# Patient Record
Sex: FEMALE | Race: BLACK OR AFRICAN AMERICAN | Employment: UNEMPLOYED | URBAN - METROPOLITAN AREA
[De-identification: names, ages, dates, MRNs, and addresses within clinical notes are randomized per-mention and may not be internally consistent; named-entity substitution may affect disease eponyms.]

---

## 2016-04-21 LAB — T. PALLIDUM, EXTERNAL: NEGATIVE

## 2016-11-14 LAB
CHLAMYDIA, EXTERNAL: NEGATIVE
HBSAG, EXTERNAL: NEGATIVE
HIV, EXTERNAL: NON REACTIVE
N. GONORRHEA, EXTERNAL: NEGATIVE
RUBELLA, EXTERNAL: NORMAL

## 2017-04-21 LAB
ANTIBODY SCREEN, EXTERNAL: NEGATIVE
T. PALLIDUM, EXTERNAL: NEGATIVE

## 2017-06-13 ENCOUNTER — HOSPITAL ENCOUNTER (OUTPATIENT)
Age: 32
Discharge: HOME | End: 2017-06-13
Attending: OBSTETRICS & GYNECOLOGY | Admitting: OBSTETRICS & GYNECOLOGY
Payer: MEDICAID

## 2017-06-13 VITALS
WEIGHT: 280 LBS | RESPIRATION RATE: 16 BRPM | HEART RATE: 93 BPM | DIASTOLIC BLOOD PRESSURE: 63 MMHG | HEIGHT: 65 IN | SYSTOLIC BLOOD PRESSURE: 119 MMHG | BODY MASS INDEX: 46.65 KG/M2 | TEMPERATURE: 98.3 F

## 2017-06-13 PROCEDURE — 99285 EMERGENCY DEPT VISIT HI MDM: CPT

## 2017-06-13 RX ORDER — RANITIDINE 150 MG/1
150 TABLET, FILM COATED ORAL 2 TIMES DAILY
COMMUNITY
End: 2017-07-02

## 2017-06-13 RX ORDER — ESOMEPRAZOLE MAGNESIUM 40 MG/1
40 CAPSULE, DELAYED RELEASE ORAL DAILY
COMMUNITY
End: 2017-07-02

## 2017-06-13 NOTE — PROGRESS NOTES
0345 - Pt and significant other to LDR Rm 9 (see details in next note). Wayne Shepherd and notified him of pt status: presenting complaint of painful UCs, breech presentation, hx of 3 c/sections (1 set of twins), no vaginal bleeding or leakage of fluid, reactive FHR tracing (lots of movement). Pt recently stated that she can tell UCs have spaced farther apart (approx 4 in past hour). Dr Fatou Shepherd states he will be out soon to assess pt.  0528 - Dr Fatou Shepherd at bedside to assess pt  0530 - SVE done: cervix closed. Dr Fatou Shepherd states pt may be discharged home and reminded her to keep her scheduled office appt this Friday. Pt agrees with plan. 1 - Discharge instructions/precautions explained; pt verbalizes understanding.

## 2017-06-13 NOTE — IP AVS SNAPSHOT
Summary of Care Report The Summary of Care report has been created to help improve care coordination. Users with access to [employer] or [address] (Web-based application) may access additional patient information including the Discharge Summary. If you are not currently a [address] user and need more information, please call the number listed below in the [place] [address] section and ask to be connected with Medical Records. Facility Information Name Address Phone [address] [id number] [phone] Patient Information Patient Name Sex SONAL Murillo ([id number]) Female 1985 Discharge Information Admitting Provider Service Area Unit Alpa Perdomo MD / [address] [place] [address] Labor & Delivery / [phone] Discharge Provider Discharge Date/Time Discharge Disposition Destination (none) 2017 05:33 (Pending) AHR (none) Patient Language Language ENGLISH [13] Hospital Problems as of 2017  Reviewed: 10/6/2014  6:38 AM by Shirley Alvarez MD  
 None Non-Hospital Problems as of 2017  Reviewed: 10/6/2014  6:38 AM by Shirley Alvarez MD  
  
  
  
 Class Noted - Resolved Last Modified Active Problems Obese  2013 - Present 2013 by Eh Chow MD  
  Entered by Eh Chow MD  
  Knee pain, left  2013 - Present 2013 by Eh Chow MD  
  Entered by Eh Chow MD  
  H/O  section  10/18/2013 - Present 10/21/2013 Entered by Margot Barton Breech presentation  10/18/2013 - Present 10/21/2013 Entered by Margot Barton Twins  2014 - Present 2014 Entered by Rosamaria Garcia MD  
  Other specified complication, antepartum  2014 - Present 2014 Entered by Rosamaria Garcia MD  
  
You are allergic to the following No active allergies Current Discharge Medication List  
  
ASK your doctor about these medications Dose & Instructions Dispensing Information Comments NexIUM 40 mg capsule Generic drug:  esomeprazole Dose:  40 mg Take 40 mg by mouth daily. Indications: HEARTBURN Refills:  0  
   
 ondansetron hcl 4 mg tablet Commonly known as:  ZOFRAN (AS HYDROCHLORIDE) Dose:  4 mg Take 1 tablet by mouth every eight (8) hours as needed for Nausea. Quantity:  20 tablet Refills:  0  
   
 prenatal multivit-ca-min-fe-fa Tab Commonly known as:  PRENATAL VITAMIN Dose:  1 Tab Take 1 Tab by mouth daily. Quantity:  30 Tab Refills:  0  
   
 ZANTAC 150 mg tablet Generic drug:  raNITIdine Dose:  150 mg Take 150 mg by mouth two (2) times a day. Indications: HEARTBURN Refills:  0 Current Immunizations Name Date Influenza Vaccine 9/10/2014, 9/1/2013 Tdap 10/9/2014 Follow-up Information Follow up With Details Comments Contact Info None   None (395) Patient stated that they have no PCP Discharge Instructions Weeks 34 to 36 of Your Pregnancy: Care Instructions Your Care Instructions By now, your baby and your belly have grown quite large. It is almost time to give birth. A full-term pregnancy can deliver between 37 and 42 weeks. Your baby's lungs are almost ready to breathe air. The bones in your baby's head are now firm enough to protect it, but soft enough to move down through the birth canal. 
You may feel excited, happy, anxious, or scared. You may wonder how you will know if you are in labor or what to expect during labor. Try to be flexible in your expectations of the birth. Because each birth is different, there is no way to know exactly what childbirth will be like for you. This care sheet will help you know what to expect and how to prepare. This may make your childbirth easier. If you haven't already had the Tdap shot during this pregnancy, talk to your doctor about getting it. It will help protect your  against pertussis infection. In the 36th week, most women have a test for group B streptococcus (GBS). GBS is a common bacteria that can live in the vagina and rectum. It can make your baby sick after birth. If you test positive, you will get antibiotics during labor. The medicine will keep your baby from getting the bacteria. Follow-up care is a key part of your treatment and safety. Be sure to make and go to all appointments, and call your doctor if you are having problems. It's also a good idea to know your test results and keep a list of the medicines you take. How can you care for yourself at home? Learn about pain relief choices · Pain is different for every woman. Talk with your doctor about your feelings about pain. · You can choose from several types of pain relief. These include medicine or breathing techniques, as well as comfort measures. You can use more than one option. · If you choose to have pain medicine during labor, talk to your doctor about your options. Some medicines lower anxiety and help with some of the pain. Others make your lower body numb so that you won't feel pain. · Be sure to tell your doctor about your pain medicine choice before you start labor or very early in your labor. You may be able to change your mind as labor progresses. · Rarely, a woman is put to sleep by medicine given through a mask or an IV. Labor and delivery · The first stage of labor has three parts: early, active, and transition. ¨ Most women have early labor at home. You can stay busy or rest, eat light snacks, drink clear fluids, and start counting contractions. ¨ When talking during a contraction gets hard, you may be moving to active labor. During active labor, you should head for the hospital if you are not there already. ¨ You are in active labor when contractions come every 3 to 4 minutes and last about 60 seconds. Your cervix is opening more rapidly. ¨ If your water breaks, contractions will come faster and stronger. ¨ During transition, your cervix is stretching, and contractions are coming more rapidly. ¨ You may want to push, but your cervix might not be ready. Your doctor will tell you when to push. · The second stage starts when your cervix is completely opened and you are ready to push. ¨ Contractions are very strong to push the baby down the birth canal. 
¨ You will feel the urge to push. You may feel like you need to have a bowel movement. ¨ You may be coached to push with contractions. These contractions will be very strong, but you will not have them as often. You can get a little rest between contractions. ¨ You may be emotional and irritable. You may not be aware of what is going on around you. ¨ One last push, and your baby is born. · The third stage is when a few more contractions push out the placenta. This may take 30 minutes or less. · The fourth stage is the welcome recovery. You may feel overwhelmed with emotions and exhausted but alert. This is a good time to start breastfeeding. Where can you learn more? Go to http://darrell-saman.info/. Enter M865 in the search box to learn more about \"Weeks 34 to 36 of Your Pregnancy: Care Instructions. \" Current as of: May 30, 2016 Content Version: 11.2 © 0986-0481 [employer]. Care instructions adapted under license by [employer] (which disclaims liability or warranty for this information). If you have questions about a medical condition or this instruction, always ask your healthcare professional. [place] [address] any warranty or liability for your use of this information. Week 37 of Your Pregnancy: Care Instructions Your Care Instructions You are near the end of your pregnancyand you're probably pretty uncomfortable. It may be harder to walk around. Lying down probably isn't comfortable either. You may have trouble getting to sleep or staying asleep. Most women deliver their babies between 40 and 41 weeks. This is a good time to think about packing a bag for the hospital with items you'll need. Then you'll be ready when labor starts. Follow-up care is a key part of your treatment and safety. Be sure to make and go to all appointments, and call your doctor if you are having problems. It's also a good idea to know your test results and keep a list of the medicines you take. How can you care for yourself at home? Learn about breastfeeding · Breastfeeding is best for your baby and good for you. · Breast milk has antibodies to help your baby fight infections. · Mothers who breastfeed often lose weight faster, because making milk burns calories. · Learning the best ways to hold your baby will make breastfeeding easier. · Let your partner bathe and diaper the baby to keep your partner from feeling left out. Snuggle together when you breastfeed. · You may want to learn how to use a breast pump and store your milk. · If you choose to bottle feed, make the feeding feel like breastfeeding so you can bond with your baby. Always hold your baby and the bottle. Do not prop bottles or let your baby fall asleep with a bottle. Learn about crying · It is common for babies to cry for 1 to 3 hours a day. Some cry more, some cry less. · Babies don't cry to make you upset or because you are a bad parent. · Crying is how your baby communicates. Your baby may be hungry; have gas; need a diaper change; or feel cold, warm, tired, lonely, or tense. Sometimes babies cry for unknown reasons. · If you respond to your baby's needs, he or she will learn to trust you. · Try to stay calm when your baby cries.  Your baby may get more upset if he or she senses that you are upset. Know how to care for your  · Your baby's umbilical cord stump will drop off on its own, usually between 1 and 2 weeks. To care for your baby's umbilical cord area: ¨ Clean the area at the bottom of the cord 2 or 3 times a day. ¨ Pay special attention to the area where the cord attaches to the skin. ¨ Keep the diaper folded below the cord. ¨ Use a damp washcloth or cotton ball to sponge bathe your baby until the stump has come off. · Your baby's first dark stool is called meconium. After the meconium is passed, your baby will develop his or her own bowel pattern. ¨ Some babies, especially  babies, have several bowel movements a day. Others have one or two a day, or one every 2 to 3 days. ¨  babies often have loose, yellow stools. Formula-fed babies have more formed stools. ¨ If your baby's stools look like little pellets, he or she is constipated. After 2 days of constipation, call your baby's doctor. · If your baby will be circumcised, you can care for him at home. ¨ Gently rinse his penis with warm water after every diaper change. Do not try to remove the film that forms on the penis. This film will go away on its own. Pat dry. ¨ Put petroleum ointment, such as Vaseline, on the area of the diaper that will touch your baby's penis. This will keep the diaper from sticking to your baby. ¨ Ask the doctor about giving your baby acetaminophen (Tylenol) for pain. Where can you learn more? Go to http://darrell-saman.info/. Enter 44 21 97 in the search box to learn more about \"Week 37 of Your Pregnancy: Care Instructions. \" Current as of: May 30, 2016 Content Version: 11.2 © 5276-2964 [employer]. Care instructions adapted under license by [employer] (which disclaims liability or warranty for this information).  If you have questions about a medical condition or this instruction, always ask your healthcare professional. [place] [address] any warranty or liability for your use of this information. Week 38 of Your Pregnancy: Care Instructions Your Care Instructions Believe it or not, your baby is almost here. You may have ideas about your baby's personality because of how much he or she moves. Or you may have noticed how he or she responds to sounds, warmth, cold, and light. You may even know what kind of music your baby likes. By now, you have a better idea of what to expect during delivery. You may have talked about your birth preferences with your doctor. But even if you want a vaginal birth, it is a good idea to learn about  births.  birth means that your baby is born through a cut (incision) in your lower belly. It is sometimes the best choice for the health of the baby and the mother. This care sheet can help you understand  births. It also gives you information about what to expect after your baby is born. And it helps you understand more about postpartum depression. Follow-up care is a key part of your treatment and safety. Be sure to make and go to all appointments, and call your doctor if you are having problems. It's also a good idea to know your test results and keep a list of the medicines you take. How can you care for yourself at home? Learn about  birth · Most C-sections are unplanned. They are done because of problems that occur during labor. These problems might include: 
¨ Labor that slows or stops. ¨ High blood pressure or other problems for the mother. ¨ Signs of distress in the baby. These signs may include a very fast or slow heart rate. · Although most mothers and babies do well after , it is major surgery. It has more risks than a vaginal delivery. · In some cases, a planned  may be safer than a vaginal delivery. This may be the case if: ¨ The mother has a health problem, such as a heart condition. ¨ The baby isn't in a head-down position for delivery. This is called a breech position. ¨ The uterus has scars from past surgeries. This could increase the chance of a tear in the uterus. ¨ There is a problem with the placenta. ¨ The mother has an infection, such as genital herpes, that could be spread to the baby. ¨ The mother is having twins or more. ¨ The baby weighs 9 to 10 pounds or more. · Because of the risks of , planned C-sections generally should be done only for medical reasons. And a planned  should be done at 39 weeks or later unless there is a medical reason to do it sooner. Know what to expect after delivery, and plan for the first few weeks at home · You, your baby, and your partner or  will get identification bands. Only people with matching bands can  the baby from the nursery. · You will learn how to feed, diaper, and bathe your baby. And you will learn how to care for the umbilical cord stump. If your baby will be circumcised, you will also learn how to care for that. · Ask people to wait to visit you until you are at home. And ask them to wash their hands before they touch your baby. · Make sure you have another adult in your home for at least 2 or 3 days after the birth. · During the first 2 weeks, limit when friends and family can visit. · Do not allow visitors who have colds or infections. Make sure all visitors are up to date with their vaccinations. Never let anyone smoke around your baby. · Try to nap when the baby naps. Be aware of postpartum depression · \"Baby blues\" are common for the first 1 to 2 weeks after birth. You may cry or feel sad or irritable for no reason. · For some women, these feelings last longer and are more intense. This is called postpartum depression. · If your symptoms last for more than a few weeks or you feel very depressed, ask your doctor for help. · Postpartum depression can be treated. Support groups and counseling can help. Sometimes medicine can also help. Where can you learn more? Go to http://darrell-saman.info/. Enter B044 in the search box to learn more about \"Week 38 of Your Pregnancy: Care Instructions. \" Current as of: May 30, 2016 Content Version: 11.2 © 7043-4421 [employer]. Care instructions adapted under license by [employer] (which disclaims liability or warranty for this information). If you have questions about a medical condition or this instruction, always ask your healthcare professional. [place] [address] any warranty or liability for your use of this information. Chart Review Routing History Recipient Method Report Sent By Brenda Carrion None [address] [employer] IP Auto Routed Notes Lyndia Moritz, MD [[id number]] 8/11/2014 11:29 PM 08/11/2014 None [address] [employer] IP Auto Routed Notes Kyle Sotelo MD [[id number]] 10/6/2014  7:45 AM 10/06/2014 None [address] [employer] IP Auto Routed Notes Lizzie Aschoff, MD 23 [id number] 10/10/2014  1:48 PM 10/10/2014

## 2017-06-13 NOTE — IP AVS SNAPSHOT
Current Discharge Medication List  
  
ASK your doctor about these medications Dose & Instructions Dispensing Information Comments Morning Noon Evening Bedtime NexIUM 40 mg capsule Generic drug:  esomeprazole Your last dose was: Your next dose is:    
   
   
 Dose:  40 mg Take 40 mg by mouth daily. Indications: HEARTBURN Refills:  0  
     
   
   
   
  
 ondansetron hcl 4 mg tablet Commonly known as:  ZOFRAN (AS HYDROCHLORIDE) Your last dose was: Your next dose is:    
   
   
 Dose:  4 mg Take 1 tablet by mouth every eight (8) hours as needed for Nausea. Quantity:  20 tablet Refills:  0  
     
   
   
   
  
 prenatal multivit-ca-min-fe-fa Tab Commonly known as:  PRENATAL VITAMIN Your last dose was: Your next dose is:    
   
   
 Dose:  1 Tab Take 1 Tab by mouth daily. Quantity:  30 Tab Refills:  0  
     
   
   
   
  
 ZANTAC 150 mg tablet Generic drug:  raNITIdine Your last dose was: Your next dose is:    
   
   
 Dose:  150 mg Take 150 mg by mouth two (2) times a day. Indications: HEARTBURN Refills:  0

## 2017-06-13 NOTE — H&P
EDC:2017  EGA: 36 weeks, 4 days      History of Present Illness:  Patient presents for evaluation of  contractions staring at 2AM. denies LOF, or VB, Positive FM. Previous  section. Patient's Prenatal Care with Doctor of Record Nata Garcia MD Notable For -    Breech presentation   lab screening  Normal pregnancy multigravida  Sickle cell trait FOB _REFUSES_  Prev LTCS desires repeat  Obesity in pregnancy BMI>34.99-FS ____  Family History of Breast Cancer          Impression & Recommendations:    Problem # 1:  Abdominal Pain Pregnancy  No evidence of  labor  No evidence of Abruption  Reassuring Fetal Monitoring    Problem # 2:  Prev LTCS desires repeat (NOZ-303.75) (PCG78-A62.211)    Problem # 3:  Breech presentation (VCW-029.70) (LRK72-J50.8xx0)    Problem # 4:  Obesity in pregnancy BMI>34.99-FS ____ (KTB-430.93) (QJU44-P11.065)    Problem # 5:  Sickle cell trait FOB _REFUSES_ (KAC-896.01) (RFZ95-D78.2xx0)    Problem # 6:  Family History of Breast Cancer (ICD-V16.3) (LJZ47-J87.3)      Past Pregnancy History      :  5     Term Births:  3     Premature Births: 0     Living Children: 4     Para:   3     Mult. Births:  0     Prev : 3     Aborta:  1     Elect. Ab:  0     Spont.  Ab:  1     Ectopics:  0    Pregnancy # 1     Delivery date:   2009     Weeks Gestation: 45      labor:   no     Delivery type:        Anesthesia type:   epidural     Delivery location:   Other     Infant Sex:  Male     Birth weight:  7lb 9oz     Name:  Derian Baird    Pregnancy # 2     Delivery date:   10/18/2013     Weeks Gestation: 39      labor:   no     Delivery type:   LTCS     Anesthesia type:   spinal     Delivery location:   [facility]     Infant Sex:  Female     Birth weight:  9vsc5cu     Name:  Tori Girard     Comments:  [place] Gal[place] - Repeat Low Transverse  Section, breech presentation  Apgars 8/9    Pregnancy # 3 Comments:  SAB    Pregnancy # 4     Delivery date:   10/06/2014     Weeks Gestation: 40 2/7     Delivery type:   RLTCS     Delivery location:   [facility]     Infant Sex:  Male/Female     Comments:  [facility] - RLTCS of twins. Twin A Male Apgars 8/9, Breech, desires circ. Twin B Female Wt 1.8 kg, Apgars 8/9, Breech, IUGR to NICU.      Pregnancy # 5     Comments:  current        Past Medical History:     Reviewed history from 2014 and no changes required:        Abnormal Pap Smear        sickle cell trait        Postpartum depression     Past Surgical History:     Reviewed history from 2014 and no changes required:        LEEP-2006        C/s x 1 2009        10/18/13 Repeat Low Transverse  Section, Female Dr. Viviane Knight        10/06/14 RLTCS of Twin, Twin A Male, Twin B Female to NICU, Dr. Josey Conde     Family History Summary:      Reviewed history Last on 2016 and no changes required:2017  Mother (Derrek Vanessa.) - Has Family History of Breast Cancer - Entered On: 2014  Mother Zakia Boss.) - Has Family History of Diabetes - Entered On: 2014  Father (Derrek Vanessa.) - Has Family History of Hypertension - Entered On: 2014  Mother Zakia Boss.) - Has Family History of Heart Disease - Entered On: 2014    General Comments - FH:  Family history transferred to MU2 compliance       Social History:     Reviewed history from 2014 and no changes required:        Single        Sukhdev Díaz      Previous Tobacco Use: Signed On - 2016  Smoked Tobacco Use:  Never smoker  Smokeless Tobacco Use:  Never  Passive smoke exposure:  no  Drug use:  no  HIV high-risk behavior:  No  Caffeine use:  <1 drinks per day    Previous Alcohol Use: Signed On - 2016  Alcohol use:  no  Exercise:  yes  Seatbelt use:  100 %  Sun Exposure:  occasionally    PAP Smear History:     Date of Last PAP Smear:  2016      Review of Systems        See HPI    Allergies      Medications Removed from Medication List        Flowsheet View for Follow-up Visit     Estimated weeks of        gestation:  36 4/7     Weight:  287.0     Blood pressure: 138 / 70     FHR:   130     Fetal position:  breech     Cx Dilation:  0     Cx Effacement: 20%     Cx Station:  high      Vital Signs    Blood Pressure: 138 / 70  FHT Descrip:    good BTBV  Contractions:  no  NST:   reactive     Weight:  287.0 pounds      Physical Exam     General           General appearance:  no acute distress    Head           Inspection:   normal    ENT           Dental:   adequate dentition    Chest           Heart:  regular rate and rhythm    Extremeties           Extremeties:  0 edema    Psych           Orientation:  oriented to time, place, and person    Lymph           Inguinal:  no inguinal adenopathy    Skin           Inspection:  no rashes, suspicious lesions, or ulcerations    Abdomen           Abdomen:  gravid    Pelvic Exam           Vagina:  normal appearing without lesions or discharge          Adnexa:  non palpable                  Dilation: : 0                  Effacement:  20%                  Station:  high                  Presentation:  breech            Impression & Recommendations:    Problem # 1:  Abdominal Pain Pregnancy  No evidence of  labor  No evidence of Abruption  Reassuring Fetal Monitoring    Problem # 2:  Prev LTCS desires repeat (ATH-537.25) (QMQ49-F48.211)    Problem # 3:  Breech presentation (KZB-606.15) (BJY91-O20.8xx0)    Problem # 4:  Obesity in pregnancy BMI>34.99-FS ____ (OCO-153.31) (DWX99-G77.564)    Problem # 5:  Sickle cell trait FOB _REFUSES_ (TMZ-587.52) (PRX55-U67.2xx0)    Problem # 6:  Family History of Breast Cancer (ICD-V16.3) (IER37-S80.3)      Medications (at conclusion of this visit)    2017 NEXIUM 24HR 20 MG ORAL CPDR (ESOMEPRAZOLE MAGNESIUM) one by mouth daily  2017 CLASSIC PRENATAL 28-0.8 MG ORAL TABS (PRENATAL VIT-FE FUMARATE-FA) 1 tab po daily  2017 PROMETHAZINE HCL 25 MG ORAL TABS (PROMETHAZINE HCL) 1 tab po q6hrs prn nausea          LABORATORY DATA   TEST DATE RESULT   Group B Strep culture 10/06/2014 *                                   (Group B Strep Culture Result Field)   Blood Type 11/14/2016 A                                             (Blood Type Result Field)   Rh 11/14/2016 Positive                                   (Rh Result Field)   Rhogam Inj Given 10/06/2014 *   Tdap Vaccine Given 04/21/2017 Vacc. [address]   Antibody Screen 04/21/2017 Negative   Rubella  [facility] Reference Ranges On or After 3/10/14                  <0.90              Non-immune      0.90 - 0.99     Equivocal      >0.99              Immune    [facility] Reference Ranges  Before 3/10/14           <5                 Non-immune             5 - 9               Equivocal            >9                 Immune  [facility] Reference Ranges       < Or = 0.90       Negative             0.91-1.09          Equivocal            > Or = 1.10       Positive   11/14/2016     2.31     TPA (T Pallidum Antibodies) 04/21/2017 Negative   Serology (RPR) 10/06/2014 *   HBsAg 11/14/2016 Negative   HIV 11/14/2016 Non Reactive   Hemoglobin 04/21/2017 11.6   Hematocrit 04/21/2017 34.9   Platelets 85/62/2456 228 X10E3/UL   TSH 08/20/2015 1.540   Urine Culture 11/14/2016 Negative   GC DNA Probe 11/14/2016 Negative   Chlamydia DNA 11/14/2016 Negative   PAP 11/14/2016 NIL   Flu Vaccine Given 11/14/2016 declined   HGBA1C 10/06/2014 *   HGB Electro     T4, Free 10/06/2014 *   BG Fasting 10/06/2014 *   GTT 1H 50G 04/21/2017 86   GTT 1H 100G 10/06/2014 *   GTT 2H 100G 10/06/2014 *   GTT 3H 100G 10/06/2014 *   Glucose Plasma 10/06/2014 *   CF Accept or Decline 11/14/2016 declined   CF Screen Result     Nuchal Trans 03/24/2017 4.95^4. 95 mm&millimeters   AFP Only 01/25/2017 Declined   Tetra 01/25/2017 Declined   AFP Serum 10/06/2014 *   CVS 10/06/2014 *   AFP Amniotic 10/06/2014 *   Amnio Karyo 10/06/2014 *   FISH 10/06/2014 *   GC Culture 10/06/2014 *   Chlamydia Cult 10/06/2014 * Ureaplasma     Mycoplasma     WBC 11/14/2016 11.9 X10E3/UL   RBC 11/14/2016 4.55 X10E6/UL   MCV 11/14/2016 84   MCH 11/14/2016 27.9   MCHC RBC 11/14/2016 33.2     ULTRASOUND DATA   TEST DATE RESULT   Estimated Fetal Weight 05/19/2017 4630.97653232^1023 g&grams                                     Weight % 05/19/2017 76^76% %&percent                                                KIARRA 05/19/2017 65.52^79.7 cm&centimeters                    BPP 05/19/2017 8^8 [n/a]&Not applicable   Cervical Length (mm) 08/26/2014 28.000           Electronically signed by Bernarda Conley on 06/13/2017 at 5:39 AM    ________________________________________________________________________

## 2017-06-13 NOTE — DISCHARGE INSTRUCTIONS
Weeks 34 to 36 of Your Pregnancy: Care Instructions  Your Care Instructions    By now, your baby and your belly have grown quite large. It is almost time to give birth. A full-term pregnancy can deliver between 37 and 42 weeks. Your baby's lungs are almost ready to breathe air. The bones in your baby's head are now firm enough to protect it, but soft enough to move down through the birth canal.  You may feel excited, happy, anxious, or scared. You may wonder how you will know if you are in labor or what to expect during labor. Try to be flexible in your expectations of the birth. Because each birth is different, there is no way to know exactly what childbirth will be like for you. This care sheet will help you know what to expect and how to prepare. This may make your childbirth easier. If you haven't already had the Tdap shot during this pregnancy, talk to your doctor about getting it. It will help protect your  against pertussis infection. In the 36th week, most women have a test for group B streptococcus (GBS). GBS is a common bacteria that can live in the vagina and rectum. It can make your baby sick after birth. If you test positive, you will get antibiotics during labor. The medicine will keep your baby from getting the bacteria. Follow-up care is a key part of your treatment and safety. Be sure to make and go to all appointments, and call your doctor if you are having problems. It's also a good idea to know your test results and keep a list of the medicines you take. How can you care for yourself at home? Learn about pain relief choices  · Pain is different for every woman. Talk with your doctor about your feelings about pain. · You can choose from several types of pain relief. These include medicine or breathing techniques, as well as comfort measures. You can use more than one option. · If you choose to have pain medicine during labor, talk to your doctor about your options.  Some medicines lower anxiety and help with some of the pain. Others make your lower body numb so that you won't feel pain. · Be sure to tell your doctor about your pain medicine choice before you start labor or very early in your labor. You may be able to change your mind as labor progresses. · Rarely, a woman is put to sleep by medicine given through a mask or an IV. Labor and delivery  · The first stage of labor has three parts: early, active, and transition. ¨ Most women have early labor at home. You can stay busy or rest, eat light snacks, drink clear fluids, and start counting contractions. ¨ When talking during a contraction gets hard, you may be moving to active labor. During active labor, you should head for the hospital if you are not there already. ¨ You are in active labor when contractions come every 3 to 4 minutes and last about 60 seconds. Your cervix is opening more rapidly. ¨ If your water breaks, contractions will come faster and stronger. ¨ During transition, your cervix is stretching, and contractions are coming more rapidly. ¨ You may want to push, but your cervix might not be ready. Your doctor will tell you when to push. · The second stage starts when your cervix is completely opened and you are ready to push. ¨ Contractions are very strong to push the baby down the birth canal.  ¨ You will feel the urge to push. You may feel like you need to have a bowel movement. ¨ You may be coached to push with contractions. These contractions will be very strong, but you will not have them as often. You can get a little rest between contractions. ¨ You may be emotional and irritable. You may not be aware of what is going on around you. ¨ One last push, and your baby is born. · The third stage is when a few more contractions push out the placenta. This may take 30 minutes or less. · The fourth stage is the welcome recovery. You may feel overwhelmed with emotions and exhausted but alert.  This is a good time to start breastfeeding. Where can you learn more? Go to http://darrell-saman.info/. Enter G939 in the search box to learn more about \"Weeks 34 to 36 of Your Pregnancy: Care Instructions. \"  Current as of: May 30, 2016  Content Version: 11.2  © 7155-9462 [email or web address]. Care instructions adapted under license by [employer] (which disclaims liability or warranty for this information). If you have questions about a medical condition or this instruction, always ask your healthcare professional. [place] [address] any warranty or liability for your use of this information. Week 37 of Your Pregnancy: Care Instructions  Your Care Instructions    You are near the end of your pregnancy--and you're probably pretty uncomfortable. It may be harder to walk around. Lying down probably isn't comfortable either. You may have trouble getting to sleep or staying asleep. Most women deliver their babies between 40 and 41 weeks. This is a good time to think about packing a bag for the hospital with items you'll need. Then you'll be ready when labor starts. Follow-up care is a key part of your treatment and safety. Be sure to make and go to all appointments, and call your doctor if you are having problems. It's also a good idea to know your test results and keep a list of the medicines you take. How can you care for yourself at home? Learn about breastfeeding  · Breastfeeding is best for your baby and good for you. · Breast milk has antibodies to help your baby fight infections. · Mothers who breastfeed often lose weight faster, because making milk burns calories. · Learning the best ways to hold your baby will make breastfeeding easier. · Let your partner bathe and diaper the baby to keep your partner from feeling left out. Snuggle together when you breastfeed. · You may want to learn how to use a breast pump and store your milk.   · If you choose to bottle feed, make the feeding feel like breastfeeding so you can bond with your baby. Always hold your baby and the bottle. Do not prop bottles or let your baby fall asleep with a bottle. Learn about crying  · It is common for babies to cry for 1 to 3 hours a day. Some cry more, some cry less. · Babies don't cry to make you upset or because you are a bad parent. · Crying is how your baby communicates. Your baby may be hungry; have gas; need a diaper change; or feel cold, warm, tired, lonely, or tense. Sometimes babies cry for unknown reasons. · If you respond to your baby's needs, he or she will learn to trust you. · Try to stay calm when your baby cries. Your baby may get more upset if he or she senses that you are upset. Know how to care for your   · Your baby's umbilical cord stump will drop off on its own, usually between 1 and 2 weeks. To care for your baby's umbilical cord area:  ¨ Clean the area at the bottom of the cord 2 or 3 times a day. ¨ Pay special attention to the area where the cord attaches to the skin. ¨ Keep the diaper folded below the cord. ¨ Use a damp washcloth or cotton ball to sponge bathe your baby until the stump has come off. · Your baby's first dark stool is called meconium. After the meconium is passed, your baby will develop his or her own bowel pattern. ¨ Some babies, especially  babies, have several bowel movements a day. Others have one or two a day, or one every 2 to 3 days. ¨  babies often have loose, yellow stools. Formula-fed babies have more formed stools. ¨ If your baby's stools look like little pellets, he or she is constipated. After 2 days of constipation, call your baby's doctor. · If your baby will be circumcised, you can care for him at home. ¨ Gently rinse his penis with warm water after every diaper change. Do not try to remove the film that forms on the penis. This film will go away on its own. Pat dry.   ¨ Put petroleum ointment, such as Vaseline, on the area of the diaper that will touch your baby's penis. This will keep the diaper from sticking to your baby. ¨ Ask the doctor about giving your baby acetaminophen (Tylenol) for pain. Where can you learn more? Go to http://darrell-saman.info/. Enter 68 21 [id number] in the search box to learn more about \"Week 37 of Your Pregnancy: Care Instructions. \"  Current as of: May 30, 2016  Content Version: 11.2  © 7501-2798 [employer]. Care instructions adapted under license by [employer] (which disclaims liability or warranty for this information). If you have questions about a medical condition or this instruction, always ask your healthcare professional. [place] [address] any warranty or liability for your use of this information. Week 38 of Your Pregnancy: Care Instructions  Your Care Instructions    Believe it or not, your baby is almost here. You may have ideas about your baby's personality because of how much he or she moves. Or you may have noticed how he or she responds to sounds, warmth, cold, and light. You may even know what kind of music your baby likes. By now, you have a better idea of what to expect during delivery. You may have talked about your birth preferences with your doctor. But even if you want a vaginal birth, it is a good idea to learn about  births.  birth means that your baby is born through a cut (incision) in your lower belly. It is sometimes the best choice for the health of the baby and the mother. This care sheet can help you understand  births. It also gives you information about what to expect after your baby is born. And it helps you understand more about postpartum depression. Follow-up care is a key part of your treatment and safety. Be sure to make and go to all appointments, and call your doctor if you are having problems.  It's also a good idea to know your test results and keep a list of the medicines you take. How can you care for yourself at home? Learn about  birth  · Most C-sections are unplanned. They are done because of problems that occur during labor. These problems might include:  ¨ Labor that slows or stops. ¨ High blood pressure or other problems for the mother. ¨ Signs of distress in the baby. These signs may include a very fast or slow heart rate. · Although most mothers and babies do well after , it is major surgery. It has more risks than a vaginal delivery. · In some cases, a planned  may be safer than a vaginal delivery. This may be the case if:  ¨ The mother has a health problem, such as a heart condition. ¨ The baby isn't in a head-down position for delivery. This is called a breech position. ¨ The uterus has scars from past surgeries. This could increase the chance of a tear in the uterus. ¨ There is a problem with the placenta. ¨ The mother has an infection, such as genital herpes, that could be spread to the baby. ¨ The mother is having twins or more. ¨ The baby weighs 9 to 10 pounds or more. · Because of the risks of , planned C-sections generally should be done only for medical reasons. And a planned  should be done at 39 weeks or later unless there is a medical reason to do it sooner. Know what to expect after delivery, and plan for the first few weeks at home  · You, your baby, and your partner or  will get identification bands. Only people with matching bands can  the baby from the nursery. · You will learn how to feed, diaper, and bathe your baby. And you will learn how to care for the umbilical cord stump. If your baby will be circumcised, you will also learn how to care for that. · Ask people to wait to visit you until you are at home. And ask them to wash their hands before they touch your baby.   · Make sure you have another adult in your home for at least 2 or 3 days after the birth.  · During the first 2 weeks, limit when friends and family can visit. · Do not allow visitors who have colds or infections. Make sure all visitors are up to date with their vaccinations. Never let anyone smoke around your baby. · Try to nap when the baby naps. Be aware of postpartum depression  · \"Baby blues\" are common for the first 1 to 2 weeks after birth. You may cry or feel sad or irritable for no reason. · For some women, these feelings last longer and are more intense. This is called postpartum depression. · If your symptoms last for more than a few weeks or you feel very depressed, ask your doctor for help. · Postpartum depression can be treated. Support groups and counseling can help. Sometimes medicine can also help. Where can you learn more? Go to http://darrell-saman.info/. Enter B044 in the search box to learn more about \"Week 38 of Your Pregnancy: Care Instructions. \"  Current as of: May 30, 2016  Content Version: 11.2  © 4020-8691 [employer]. Care instructions adapted under license by [employer] (which disclaims liability or warranty for this information). If you have questions about a medical condition or this instruction, always ask your healthcare professional. [address] any warranty or liability for your use of this information.

## 2017-06-13 NOTE — IP AVS SNAPSHOT
[address] 
[phone] Patient: Phyllis Cole MRN: [id number] XXD:[id number] You are allergic to the following No active allergies Recent Documentation Height Weight Breastfeeding? BMI OB Status Smoking Status 1.651 m 127 kg No 46.59 kg/m2 Pregnant Never Smoker Emergency Contacts Name Discharge Info Relation Home Work Mobile [address] [facility]  Parent [1] [phone] About your hospitalization You were admitted on:  2017 You last received care in the:  [facility] 3 LABOR & DELIVERY You were discharged on:  2017 Unit phone number:  [phone] Why you were hospitalized Your primary diagnosis was:  Not on File Providers Seen During Your Hospitalizations Provider Role Specialty Primary office phone Carlos Mccray MD Attending Provider Obstetrics & Gynecology [phone] Your Primary Care Physician (PCP) Primary Care Physician Office Phone Office Fax NONE ** None ** ** None ** Follow-up Information Follow up With Details Comments Contact Info None   None ([phone]) Patient stated that they have no PCP Your Appointments   8:00 AM EDT  
L&D PAT with [facility] L&D PAT  
[facility] LD PAT SHADOW ([address]) [address]  
[phone] 2017  SECTION with Carlos Mccray MD  
[facility] 3 LABOR & DELIVERY (--)  
 [address]  
[phone] Current Discharge Medication List  
  
ASK your doctor about these medications Dose & Instructions Dispensing Information Comments Morning Noon Evening Bedtime NexIUM 40 mg capsule Generic drug:  esomeprazole Your last dose was: Your next dose is:    
   
   
 Dose:  40 mg Take 40 mg by mouth daily.  Indications: HEARTBURN  
 Refills:  0  
     
   
   
   
  
 ondansetron hcl 4 mg tablet Commonly known as:  ZOFRAN (AS HYDROCHLORIDE) Your last dose was: Your next dose is:    
   
   
 Dose:  4 mg Take 1 tablet by mouth every eight (8) hours as needed for Nausea. Quantity:  20 tablet Refills:  0  
     
   
   
   
  
 prenatal multivit-ca-min-fe-fa Tab Commonly known as:  PRENATAL VITAMIN Your last dose was: Your next dose is:    
   
   
 Dose:  1 Tab Take 1 Tab by mouth daily. Quantity:  30 Tab Refills:  0  
     
   
   
   
  
 ZANTAC 150 mg tablet Generic drug:  raNITIdine Your last dose was: Your next dose is:    
   
   
 Dose:  150 mg Take 150 mg by mouth two (2) times a day. Indications: HEARTBURN Refills:  0 Discharge Instructions Weeks 34 to 36 of Your Pregnancy: Care Instructions Your Care Instructions By now, your baby and your belly have grown quite large. It is almost time to give birth. A full-term pregnancy can deliver between 37 and 42 weeks. Your baby's lungs are almost ready to breathe air. The bones in your baby's head are now firm enough to protect it, but soft enough to move down through the birth canal. 
You may feel excited, happy, anxious, or scared. You may wonder how you will know if you are in labor or what to expect during labor. Try to be flexible in your expectations of the birth. Because each birth is different, there is no way to know exactly what childbirth will be like for you. This care sheet will help you know what to expect and how to prepare. This may make your childbirth easier. If you haven't already had the Tdap shot during this pregnancy, talk to your doctor about getting it. It will help protect your  against pertussis infection. In the 36th week, most women have a test for group B streptococcus (GBS). GBS is a common bacteria that can live in the vagina and rectum. It can make your baby sick after birth. If you test positive, you will get antibiotics during labor. The medicine will keep your baby from getting the bacteria. Follow-up care is a key part of your treatment and safety. Be sure to make and go to all appointments, and call your doctor if you are having problems. It's also a good idea to know your test results and keep a list of the medicines you take. How can you care for yourself at home? Learn about pain relief choices · Pain is different for every woman. Talk with your doctor about your feelings about pain. · You can choose from several types of pain relief. These include medicine or breathing techniques, as well as comfort measures. You can use more than one option. · If you choose to have pain medicine during labor, talk to your doctor about your options. Some medicines lower anxiety and help with some of the pain. Others make your lower body numb so that you won't feel pain. · Be sure to tell your doctor about your pain medicine choice before you start labor or very early in your labor. You may be able to change your mind as labor progresses. · Rarely, a woman is put to sleep by medicine given through a mask or an IV. Labor and delivery · The first stage of labor has three parts: early, active, and transition. ¨ Most women have early labor at home. You can stay busy or rest, eat light snacks, drink clear fluids, and start counting contractions. ¨ When talking during a contraction gets hard, you may be moving to active labor. During active labor, you should head for the hospital if you are not there already. ¨ You are in active labor when contractions come every 3 to 4 minutes and last about 60 seconds. Your cervix is opening more rapidly. ¨ If your water breaks, contractions will come faster and stronger.  
¨ During transition, your cervix is stretching, and contractions are coming more rapidly. ¨ You may want to push, but your cervix might not be ready. Your doctor will tell you when to push. · The second stage starts when your cervix is completely opened and you are ready to push. ¨ Contractions are very strong to push the baby down the birth canal. 
¨ You will feel the urge to push. You may feel like you need to have a bowel movement. ¨ You may be coached to push with contractions. These contractions will be very strong, but you will not have them as often. You can get a little rest between contractions. ¨ You may be emotional and irritable. You may not be aware of what is going on around you. ¨ One last push, and your baby is born. · The third stage is when a few more contractions push out the placenta. This may take 30 minutes or less. · The fourth stage is the welcome recovery. You may feel overwhelmed with emotions and exhausted but alert. This is a good time to start breastfeeding. Where can you learn more? Go to http://darrell-saman.info/. Enter Q282 in the search box to learn more about \"Weeks 34 to 36 of Your Pregnancy: Care Instructions. \" Current as of: May 30, 2016 Content Version: 11.2 © 7991-2813 [employer]. Care instructions adapted under license by [employer] (which disclaims liability or warranty for this information). If you have questions about a medical condition or this instruction, always ask your healthcare professional. [place] [address] any warranty or liability for your use of this information. Week 37 of Your Pregnancy: Care Instructions Your Care Instructions You are near the end of your pregnancyand you're probably pretty uncomfortable. It may be harder to walk around. Lying down probably isn't comfortable either. You may have trouble getting to sleep or staying asleep. Most women deliver their babies between 40 and 41 weeks.  This is a good time to think about packing a bag for the hospital with items you'll need. Then you'll be ready when labor starts. Follow-up care is a key part of your treatment and safety. Be sure to make and go to all appointments, and call your doctor if you are having problems. It's also a good idea to know your test results and keep a list of the medicines you take. How can you care for yourself at home? Learn about breastfeeding · Breastfeeding is best for your baby and good for you. · Breast milk has antibodies to help your baby fight infections. · Mothers who breastfeed often lose weight faster, because making milk burns calories. · Learning the best ways to hold your baby will make breastfeeding easier. · Let your partner bathe and diaper the baby to keep your partner from feeling left out. Snuggle together when you breastfeed. · You may want to learn how to use a breast pump and store your milk. · If you choose to bottle feed, make the feeding feel like breastfeeding so you can bond with your baby. Always hold your baby and the bottle. Do not prop bottles or let your baby fall asleep with a bottle. Learn about crying · It is common for babies to cry for 1 to 3 hours a day. Some cry more, some cry less. · Babies don't cry to make you upset or because you are a bad parent. · Crying is how your baby communicates. Your baby may be hungry; have gas; need a diaper change; or feel cold, warm, tired, lonely, or tense. Sometimes babies cry for unknown reasons. · If you respond to your baby's needs, he or she will learn to trust you. · Try to stay calm when your baby cries. Your baby may get more upset if he or she senses that you are upset. Know how to care for your  · Your baby's umbilical cord stump will drop off on its own, usually between 1 and 2 weeks. To care for your baby's umbilical cord area: ¨ Clean the area at the bottom of the cord 2 or 3 times a day. ¨ Pay special attention to the area where the cord attaches to the skin. ¨ Keep the diaper folded below the cord. ¨ Use a damp washcloth or cotton ball to sponge bathe your baby until the stump has come off. · Your baby's first dark stool is called meconium. After the meconium is passed, your baby will develop his or her own bowel pattern. ¨ Some babies, especially  babies, have several bowel movements a day. Others have one or two a day, or one every 2 to 3 days. ¨  babies often have loose, yellow stools. Formula-fed babies have more formed stools. ¨ If your baby's stools look like little pellets, he or she is constipated. After 2 days of constipation, call your baby's doctor. · If your baby will be circumcised, you can care for him at home. ¨ Gently rinse his penis with warm water after every diaper change. Do not try to remove the film that forms on the penis. This film will go away on its own. Pat dry. ¨ Put petroleum ointment, such as Vaseline, on the area of the diaper that will touch your baby's penis. This will keep the diaper from sticking to your baby. ¨ Ask the doctor about giving your baby acetaminophen (Tylenol) for pain. Where can you learn more? Go to http://darrell-saman.info/. Enter 83 80 04 in the search box to learn more about \"Week 37 of Your Pregnancy: Care Instructions. \" Current as of: May 30, 2016 Content Version: 11.2 © 0059-5870 [employer]. Care instructions adapted under license by [employer] (which disclaims liability or warranty for this information). If you have questions about a medical condition or this instruction, always ask your healthcare professional. [place] [address] any warranty or liability for your use of this information. Week 38 of Your Pregnancy: Care Instructions Your Care Instructions Believe it or not, your baby is almost here.  You may have ideas about your baby's personality because of how much he or she moves. Or you may have noticed how he or she responds to sounds, warmth, cold, and light. You may even know what kind of music your baby likes. By now, you have a better idea of what to expect during delivery. You may have talked about your birth preferences with your doctor. But even if you want a vaginal birth, it is a good idea to learn about  births.  birth means that your baby is born through a cut (incision) in your lower belly. It is sometimes the best choice for the health of the baby and the mother. This care sheet can help you understand  births. It also gives you information about what to expect after your baby is born. And it helps you understand more about postpartum depression. Follow-up care is a key part of your treatment and safety. Be sure to make and go to all appointments, and call your doctor if you are having problems. It's also a good idea to know your test results and keep a list of the medicines you take. How can you care for yourself at home? Learn about  birth · Most C-sections are unplanned. They are done because of problems that occur during labor. These problems might include: 
¨ Labor that slows or stops. ¨ High blood pressure or other problems for the mother. ¨ Signs of distress in the baby. These signs may include a very fast or slow heart rate. · Although most mothers and babies do well after , it is major surgery. It has more risks than a vaginal delivery. · In some cases, a planned  may be safer than a vaginal delivery. This may be the case if: ¨ The mother has a health problem, such as a heart condition. ¨ The baby isn't in a head-down position for delivery. This is called a breech position. ¨ The uterus has scars from past surgeries. This could increase the chance of a tear in the uterus. ¨ There is a problem with the placenta. ¨ The mother has an infection, such as genital herpes, that could be spread to the baby. ¨ The mother is having twins or more. ¨ The baby weighs 9 to 10 pounds or more. · Because of the risks of , planned C-sections generally should be done only for medical reasons. And a planned  should be done at 39 weeks or later unless there is a medical reason to do it sooner. Know what to expect after delivery, and plan for the first few weeks at home · You, your baby, and your partner or  will get identification bands. Only people with matching bands can  the baby from the nursery. · You will learn how to feed, diaper, and bathe your baby. And you will learn how to care for the umbilical cord stump. If your baby will be circumcised, you will also learn how to care for that. · Ask people to wait to visit you until you are at home. And ask them to wash their hands before they touch your baby. · Make sure you have another adult in your home for at least 2 or 3 days after the birth. · During the first 2 weeks, limit when friends and family can visit. · Do not allow visitors who have colds or infections. Make sure all visitors are up to date with their vaccinations. Never let anyone smoke around your baby. · Try to nap when the baby naps. Be aware of postpartum depression · \"Baby blues\" are common for the first 1 to 2 weeks after birth. You may cry or feel sad or irritable for no reason. · For some women, these feelings last longer and are more intense. This is called postpartum depression. · If your symptoms last for more than a few weeks or you feel very depressed, ask your doctor for help. · Postpartum depression can be treated. Support groups and counseling can help. Sometimes medicine can also help. Where can you learn more? Go to http://darrell-saman.info/. Enter B044 in the search box to learn more about \"Week 38 of Your Pregnancy: Care Instructions. \" 
 Current as of: May 30, 2016 Content Version: 11.2 © 1275-6460 [employer]. Care instructions adapted under license by [employer] (which disclaims liability or warranty for this information). If you have questions about a medical condition or this instruction, always ask your healthcare professional. [address] any warranty or liability for your use of this information. Discharge Orders None Introducing [facility]! Larissa Fairchild introduces Patentspin patient portal. Now you can access parts of your medical record, email your doctor's office, and request medication refills online. 1. In your internet browser, go to [email or web address] 2. Click on the First Time User? Click Here link in the Sign In box. You will see the New Member Sign Up page. 3. Enter your Patentspin Access Code exactly as it appears below. You will not need to use this code after youve completed the sign-up process. If you do not sign up before the expiration date, you must request a new code. · Patentspin Access Code: [email or web address] Expires: 9/11/2017  5:37 AM 
 
4. Enter the last four digits of your Social Security Number (xxxx) and Date of Birth (mm/dd/yyyy) as indicated and click Submit. You will be taken to the next sign-up page. 5. Create a Patentspin ID. This will be your Patentspin login ID and cannot be changed, so think of one that is secure and easy to remember. 6. Create a Patentspin password. You can change your password at any time. 7. Enter your Password Reset Question and Answer. This can be used at a later time if you forget your password. 8. Enter your e-mail address. You will receive e-mail notification when new information is available in [address] 9. Click Sign Up. You can now view and download portions of your medical record. 10. Click the Download Summary menu link to download a portable copy of your medical information. If you have questions, please visit the Frequently Asked Questions section of the MyChart website. Remember, MyChart is NOT to be used for urgent needs. For medical emergencies, dial 911. Now available from your iPhone and Android! General Information Please provide this summary of care documentation to your next provider. Patient Signature:  ____________________________________________________________ Date:  ____________________________________________________________  
  
Geena Richardson Provider Signature:  ____________________________________________________________ Date:  ____________________________________________________________

## 2017-06-16 LAB — GRBS, EXTERNAL: NEGATIVE

## 2017-06-29 ENCOUNTER — HOSPITAL ENCOUNTER (OUTPATIENT)
Dept: OTHER | Age: 32
Discharge: HOME | DRG: 540 | End: 2017-06-29
Attending: OBSTETRICS & GYNECOLOGY | Admitting: OBSTETRICS & GYNECOLOGY
Payer: MEDICAID

## 2017-06-29 ENCOUNTER — ANESTHESIA EVENT (OUTPATIENT)
Dept: LABOR AND DELIVERY | Age: 32
DRG: 540 | End: 2017-06-29
Payer: MEDICAID

## 2017-06-29 VITALS — HEIGHT: 65 IN | WEIGHT: 281 LBS | BODY MASS INDEX: 46.82 KG/M2

## 2017-06-29 LAB
ABO + RH BLD: NORMAL
BLOOD GROUP ANTIBODIES SERPL: NORMAL
ERYTHROCYTE [DISTWIDTH] IN BLOOD BY AUTOMATED COUNT: 13.8 % (ref 11.5–14.5)
HCT VFR BLD AUTO: 33.6 % (ref 35–47)
HGB BLD-MCNC: 11.3 G/DL (ref 11.5–16)
MCH RBC QN AUTO: 28 PG (ref 26–34)
MCHC RBC AUTO-ENTMCNC: 33.6 G/DL (ref 30–36.5)
MCV RBC AUTO: 83.4 FL (ref 80–99)
PLATELET # BLD AUTO: 170 K/UL (ref 150–400)
RBC # BLD AUTO: 4.03 M/UL (ref 3.8–5.2)
SPECIMEN EXP DATE BLD: NORMAL
WBC # BLD AUTO: 8.8 K/UL (ref 3.6–11)

## 2017-06-29 PROCEDURE — 36415 COLL VENOUS BLD VENIPUNCTURE: CPT | Performed by: OBSTETRICS & GYNECOLOGY

## 2017-06-29 PROCEDURE — 85027 COMPLETE CBC AUTOMATED: CPT | Performed by: OBSTETRICS & GYNECOLOGY

## 2017-06-29 PROCEDURE — 86900 BLOOD TYPING SEROLOGIC ABO: CPT | Performed by: OBSTETRICS & GYNECOLOGY

## 2017-06-29 RX ORDER — OXYTOCIN/RINGER'S LACTATE 20/1000 ML
125-1000 PLASTIC BAG, INJECTION (ML) INTRAVENOUS
Status: CANCELLED | OUTPATIENT
Start: 2017-06-29

## 2017-06-29 RX ORDER — PROMETHAZINE HYDROCHLORIDE 25 MG/1
25 TABLET ORAL
COMMUNITY
End: 2017-07-02

## 2017-06-29 NOTE — H&P
EDC:2017  EGA: 38 weeks, 4 days      Vital Signs   34Years Old Female  Weight: 284.0 pounds  BP:       106/58    Pap/HPV/Gardasil History   History of abnormal pap: no  Gardasil Injection History: Not Applicable    Patient's Prenatal Care with Doctor of Record Hang Weinberg MD Notable For -    Breech presentation   lab screening  Normal pregnancy multigravida  Sickle cell trait FOB _REFUSES_  Prev LTCS desires repeat  Obesity in pregnancy BMI>34.99-FS ____  Family History of Breast Cancer              Past Pregnancy History      :  5     Term Births:  3     Premature Births: 0     Living Children: 4     Para:   3     Mult. Births:  0     Prev : 3     Aborta:  1     Elect. Ab:  0     Spont. Ab:  1     Ectopics:  0    Pregnancy # 1     Delivery date:   2009     Weeks Gestation: 45      labor:   no     Delivery type:        Anesthesia type:   epidural     Delivery location:   Other     Infant Sex:  Male     Birth weight:  7lb 9oz     Name:  Tiago Crespo    Pregnancy # 2     Delivery date:   10/18/2013     Weeks Gestation: 39      labor:   no     Delivery type:   LTCS     Anesthesia type:   spinal     Delivery location:   [facility]     Infant Sex:  Female     Birth weight:  3zoj1xd     Name:  Victor Manuel Evangelista     Comments:  Paulie Velazquez - Repeat Low Transverse  Section, breech presentation  Apgars 8/9    Pregnancy # 3     Comments:  GO    Pregnancy # 4     Delivery date:   10/06/2014     Weeks Gestation: 40 2/7     Delivery type:   RLTCS     Delivery location:   [facility]     Infant Sex:  Male/Female     Comments:  Chu - RLTCS of twins. Twin A Male Apgars 8/9, Breech, desires circ. Twin B Female Wt 1.8 kg, Apgars 8/9, Breech, IUGR to NICU. Pregnancy # 5     Comments:  current        Allergies    This patient has no known allergies.     Medications Removed from Medication List        Remington Cervantes for Follow-up Visit     Estimated weeks of gestation:  38 4/7     Weight:  284.0     Blood pressure: 106 / 58     Urine Protein:  N     Urine Glucose: N     Headache:  few     Nausea/vomiting: nausea     Edema: TrLE     Vaginal bleeding: no     Vaginal discharge: no     Fetal activity:  yes     FHR:   141/US     Labor symptoms: cramping     Fetal position:  breech     Next visit:  1 wk     Preceptor:  Steph Vigil:  denies exposure     Comment:  Breech. Plan to start Zoloft postpartum for hx PPD. Impression & Recommendations:    Problem # 1:  Prev LTCS desires repeat (ZLQ-241.33) (KPL96-U61.211)  Admit  NPO  EFM/[place]  IV abx. To OR for RLTCS. Problem # 2:  Breech presentation (OOK-089.29) (TSN96-M55.8xx0)    Problem # 3:  Previous depression postpartum (ICD-V11.8) (HJR67-Y89.59)  Planning to bottle feed. Reviewed hx of PPD is risk factor in this pregnancy. Reviewed starting Zoloft postpartum to reduce risk of PPD . Patient agrees. Other Orders:  Antepartum Care (CPT-10)      Medications (at conclusion of this visit)    2017 NEXIUM 24HR 20 MG ORAL CPDR (ESOMEPRAZOLE MAGNESIUM) one by mouth daily  2017 CLASSIC PRENATAL 28-0.8 MG ORAL TABS (PRENATAL VIT-FE FUMARATE-FA) 1 tab po daily  2017 PROMETHAZINE HCL 25 MG ORAL TABS (PROMETHAZINE HCL) 1 tab po q6hrs prn nausea          Primary Provider:  Loyd Montoya MD    CC:  Breech and previous CS. History of Present Illness:  Pt is a 33 yo  with hx previous CS x3 with breech presentation. Pregnancy has been complicated by morbid obesity.           Past Medical History:     Reviewed history from 2014 and no changes required:        Abnormal Pap Smear        sickle cell trait        Postpartum depression     Past Surgical History:     Reviewed history from 2014 and no changes required:        LEEP-2006        C/s x 1 2009        10/18/13 Repeat Low Transverse  Section, Female Dr. Brittanie Vidal        10/06/14 RLTCS of Twin, Twin A Male, Twin B Female to NICU, Dr. Sang Gilmore     Family History Summary:      Reviewed history Last on 06/13/2017 and no changes required:06/27/2017  Mother Juan Manuel Rushing.) - Has Family History of Breast Cancer - Entered On: 12/29/2014  Mother Juan Manuel Rushing.) - Has Family History of Diabetes - Entered On: 12/29/2014  Father (Vale Sofia.) - Has Family History of Hypertension - Entered On: 12/29/2014  Mother Juan Manuel Rushing.) - Has Family History of Heart Disease - Entered On: 12/29/2014    General Comments - FH:  Family history transferred to [facility] compliance       Social History:     Reviewed history from 04/25/2014 and no changes required:        Single        Sukhdev Díaz      Previous Tobacco Use: Signed On - 11/14/2016  Smoked Tobacco Use:  Never smoker  Smokeless Tobacco Use:  Never  Passive smoke exposure:  no  Drug use:  no  HIV high-risk behavior:  No  Caffeine use:  <1 drinks per day    Previous Alcohol Use: Signed On - 11/14/2016  Alcohol use:  no  Exercise:  yes  Seatbelt use:  100 %  Sun Exposure:  occasionally    PAP Smear History:     Date of Last PAP Smear:  11/14/2016      Review of Systems        See HPI    Except as noted in the HPI, the review of systems is negative for General, Breast, , Resp, GI, Endo, MS, Psych and Heme.       Vital Signs    Blood Pressure: 106 / 58    Weight:  284.0 pounds      Physical Exam     General           General appearance:  no acute distress    Head           Inspection:   normal    Eyes           External:   EOM intact    ENT           Dental:   adequate dentition    Extremeties           Extremeties:  0 edema    Neurological           Reflexes:  2+ and symmetric with no pathological reflexes    Psych           Orientation:  oriented to time, place, and person          Mood:  no appearance of anxiety, depression, or agitation    Lymph           Inguinal:  no inguinal adenopathy    Skin           Inspection:  no rashes, suspicious lesions, or ulcerations    Abdomen           Abdomen:  gravid    Pelvic Exam           EGBUS:  no lesions          Vagina:  normal appearing without lesions or discharge          Uterus:  gravid          Cervix:  no lesions or discharge                  Presentation:  breech    Allergies    This patient has no known allergies. Medications Removed from Medication List        Impression & Recommendations:    Problem # 1:  Prev LTCS desires repeat (JBB-454.06) (XKK60-O89.211)  Admit  NPO  EFM/[place]  IV abx. To OR for RLTCS. Problem # 2:  Breech presentation (WBP-355.80) (ZRT22-S92.8xx0)    Problem # 3:  Previous depression postpartum (ICD-V11.8) (GKS96-O01.59)  Planning to bottle feed. Reviewed hx of PPD is risk factor in this pregnancy. Reviewed starting Zoloft postpartum to reduce risk of PPD . Patient agrees. Other Orders:  Antepartum Care (CPT-10)      Medications (at conclusion of this visit)    05/05/2017 NEXIUM 24HR 20 MG ORAL CPDR (ESOMEPRAZOLE MAGNESIUM) one by mouth daily  04/21/2017 CLASSIC PRENATAL 28-0.8 MG ORAL TABS (PRENATAL VIT-FE FUMARATE-FA) 1 tab po daily  02/20/2017 PROMETHAZINE HCL 25 MG ORAL TABS (PROMETHAZINE HCL) 1 tab po q6hrs prn nausea          LABORATORY DATA   TEST DATE RESULT   Group B Strep culture 06/16/2017 Negative                                   (Group B Strep Culture Result Field)   Blood Type 11/14/2016 A                                             (Blood Type Result Field)   Rh 11/14/2016 Positive                                   (Rh Result Field)   Rhogam Inj Given 10/06/2014 *   Tdap Vaccine Given 04/21/2017 Vacc.  [address]   Antibody Screen 04/21/2017 Negative   Rubella  [facility] Reference Ranges On or After 3/10/14                  <0.90              Non-immune      0.90 - 0.99     Equivocal      >0.99              Immune    [facility] Reference Ranges  Before 3/10/14           <5                 Non-immune             5 - 9               Equivocal            >9                 Immune  [facility] Reference Ranges       < Or = 0.90       Negative             0.91-1.09 Equivocal            > Or = 1.10       Positive   11/14/2016     2.31     TPA (T Pallidum Antibodies) 04/21/2017 Negative   Serology (RPR) 10/06/2014 *   HBsAg 11/14/2016 Negative   HIV 11/14/2016 Non Reactive   Hemoglobin 04/21/2017 11.6   Hematocrit 04/21/2017 34.9   Platelets 61/42/4762 228 X10E3/UL   TSH 08/20/2015 1.540   Urine Culture 11/14/2016 Negative   GC DNA Probe 11/14/2016 Negative   Chlamydia DNA 11/14/2016 Negative   PAP 11/14/2016 NIL   Flu Vaccine Given 11/14/2016 declined   HGBA1C 10/06/2014 *   HGB Electro     T4, Free 10/06/2014 *   BG Fasting 10/06/2014 *   GTT 1H 50G 04/21/2017 86   GTT 1H 100G 10/06/2014 *   GTT 2H 100G 10/06/2014 *   GTT 3H 100G 10/06/2014 *   Glucose Plasma 10/06/2014 *   CF Accept or Decline 11/14/2016 declined   CF Screen Result     Nuchal Trans 03/24/2017 4.95^4. 95 mm&millimeters   AFP Only 01/25/2017 Declined   Tetra 01/25/2017 Declined   AFP Serum 10/06/2014 *   CVS 10/06/2014 *   AFP Amniotic 10/06/2014 *   Amnio Karyo 10/06/2014 *   FISH 10/06/2014 *   GC Culture 10/06/2014 *   Chlamydia Cult 10/06/2014 *   Ureaplasma     Mycoplasma     WBC 11/14/2016 11.9 X10E3/UL   RBC 11/14/2016 4.55 X10E6/UL   MCV 11/14/2016 84   MCH 11/14/2016 27.9   MCHC RBC 11/14/2016 33.2     ULTRASOUND DATA   TEST DATE RESULT   Estimated Fetal Weight 06/16/2017 8770.75097978^8489 g&grams                                     Weight % 06/16/2017 77^77% %&percent                                                KIARRA 06/20/2017 18.92^18.9 cm&centimeters                    BPP 06/20/2017 8^8 [n/a]&Not applicable   Cervical Length (mm) 08/26/2014 28.000     ]      Electronically signed by Ruslan López MD on 06/27/2017 at 7:57 PM    ________________________________________________________________________

## 2017-06-29 NOTE — PROGRESS NOTES
Patient here for Pre-Admission Testing (PAT) for scheduled  section. PAT packet reviewed with the patient. Labs drawn and sent. MABEL wipes and preventing surgical site infection education given to the patient. Education also provided to be NPO after 0400 and to arrive for scheduled procedure at 1000 on 17. Patient verbalizes understanding and sent home with PAT packet for further review.

## 2017-06-30 ENCOUNTER — HOSPITAL ENCOUNTER (INPATIENT)
Age: 32
LOS: 2 days | Discharge: HOME | DRG: 540 | End: 2017-07-02
Attending: OBSTETRICS & GYNECOLOGY | Admitting: OBSTETRICS & GYNECOLOGY
Payer: MEDICAID

## 2017-06-30 ENCOUNTER — ANESTHESIA (OUTPATIENT)
Dept: LABOR AND DELIVERY | Age: 32
DRG: 540 | End: 2017-06-30
Payer: MEDICAID

## 2017-06-30 PROBLEM — Z98.891 PREVIOUS CESAREAN SECTION: Status: ACTIVE | Noted: 2017-06-30

## 2017-06-30 PROCEDURE — 76010000392 HC C SECN EA ADDL 0.5 HR: Performed by: OBSTETRICS & GYNECOLOGY

## 2017-06-30 PROCEDURE — 77030014125 HC TY EPDRL BBMI -B: Performed by: NURSE ANESTHETIST, CERTIFIED REGISTERED

## 2017-06-30 PROCEDURE — 75410000003 HC RECOV DEL/VAG/CSECN EA 0.5 HR: Performed by: OBSTETRICS & GYNECOLOGY

## 2017-06-30 PROCEDURE — 77030002888 HC SUT CHRMC J&J -A

## 2017-06-30 PROCEDURE — 77030018836 HC SOL IRR NACL ICUM -A

## 2017-06-30 PROCEDURE — 77030012935 HC DRSG AQUACEL BMS -B

## 2017-06-30 PROCEDURE — 74011250636 HC RX REV CODE- 250/636

## 2017-06-30 PROCEDURE — 77030002966 HC SUT PDS J&J -A

## 2017-06-30 PROCEDURE — 65410000002 HC RM PRIVATE OB

## 2017-06-30 PROCEDURE — 77030011640 HC PAD GRND REM COVD -A

## 2017-06-30 PROCEDURE — 77030031139 HC SUT VCRL2 J&J -A

## 2017-06-30 PROCEDURE — 77030018846 HC SOL IRR STRL H20 ICUM -A

## 2017-06-30 PROCEDURE — 77030011220 HC DEV ELECSURG COVD -B

## 2017-06-30 PROCEDURE — 74011250636 HC RX REV CODE- 250/636: Performed by: OBSTETRICS & GYNECOLOGY

## 2017-06-30 PROCEDURE — 74011000250 HC RX REV CODE- 250

## 2017-06-30 PROCEDURE — 77030007866 HC KT SPN ANES BBMI -B: Performed by: NURSE ANESTHETIST, CERTIFIED REGISTERED

## 2017-06-30 PROCEDURE — 77030014144 HC TY SPN ANES BBMI -B

## 2017-06-30 PROCEDURE — 76010000391 HC C SECN FIRST 1 HR: Performed by: OBSTETRICS & GYNECOLOGY

## 2017-06-30 PROCEDURE — 77030032490 HC SLV COMPR SCD KNE COVD -B

## 2017-06-30 PROCEDURE — 74011250636 HC RX REV CODE- 250/636: Performed by: ANESTHESIOLOGY

## 2017-06-30 PROCEDURE — 74011000258 HC RX REV CODE- 258: Performed by: ANESTHESIOLOGY

## 2017-06-30 PROCEDURE — 77030002933 HC SUT MCRYL J&J -A

## 2017-06-30 PROCEDURE — 3E0R3CZ INTRODUCE REGIONAL ANESTH IN SPINAL CANAL, PERC: ICD-10-PCS | Performed by: ANESTHESIOLOGY

## 2017-06-30 PROCEDURE — 76060000078 HC EPIDURAL ANESTHESIA: Performed by: OBSTETRICS & GYNECOLOGY

## 2017-06-30 RX ORDER — SODIUM CHLORIDE 0.9 % (FLUSH) 0.9 %
5-10 SYRINGE (ML) INJECTION AS NEEDED
Status: DISCONTINUED | OUTPATIENT
Start: 2017-06-30 | End: 2017-06-30 | Stop reason: HOSPADM

## 2017-06-30 RX ORDER — MORPHINE SULFATE 10 MG/ML
6 INJECTION, SOLUTION INTRAMUSCULAR; INTRAVENOUS
Status: ACTIVE | OUTPATIENT
Start: 2017-06-30 | End: 2017-07-01

## 2017-06-30 RX ORDER — SODIUM CHLORIDE, SODIUM LACTATE, POTASSIUM CHLORIDE, CALCIUM CHLORIDE 600; 310; 30; 20 MG/100ML; MG/100ML; MG/100ML; MG/100ML
125 INJECTION, SOLUTION INTRAVENOUS CONTINUOUS
Status: DISCONTINUED | OUTPATIENT
Start: 2017-06-30 | End: 2017-07-02 | Stop reason: HOSPADM

## 2017-06-30 RX ORDER — SERTRALINE HYDROCHLORIDE 50 MG/1
50 TABLET, FILM COATED ORAL DAILY
Status: DISCONTINUED | OUTPATIENT
Start: 2017-07-01 | End: 2017-07-02 | Stop reason: HOSPADM

## 2017-06-30 RX ORDER — MORPHINE SULFATE 0.5 MG/ML
INJECTION, SOLUTION EPIDURAL; INTRATHECAL; INTRAVENOUS AS NEEDED
Status: DISCONTINUED | OUTPATIENT
Start: 2017-06-30 | End: 2017-06-30 | Stop reason: HOSPADM

## 2017-06-30 RX ORDER — SODIUM CHLORIDE 0.9 % (FLUSH) 0.9 %
5-10 SYRINGE (ML) INJECTION AS NEEDED
Status: DISCONTINUED | OUTPATIENT
Start: 2017-06-30 | End: 2017-07-02 | Stop reason: HOSPADM

## 2017-06-30 RX ORDER — OXYTOCIN/RINGER'S LACTATE 20/1000 ML
125-1000 PLASTIC BAG, INJECTION (ML) INTRAVENOUS AS NEEDED
Status: DISCONTINUED | OUTPATIENT
Start: 2017-06-30 | End: 2017-07-02 | Stop reason: HOSPADM

## 2017-06-30 RX ORDER — ONDANSETRON 2 MG/ML
4 INJECTION INTRAMUSCULAR; INTRAVENOUS
Status: DISPENSED | OUTPATIENT
Start: 2017-06-30 | End: 2017-07-01

## 2017-06-30 RX ORDER — MORPHINE SULFATE 10 MG/ML
10 INJECTION, SOLUTION INTRAMUSCULAR; INTRAVENOUS
Status: DISPENSED | OUTPATIENT
Start: 2017-06-30 | End: 2017-07-01

## 2017-06-30 RX ORDER — BUPIVACAINE HYDROCHLORIDE 7.5 MG/ML
INJECTION, SOLUTION EPIDURAL; RETROBULBAR AS NEEDED
Status: DISCONTINUED | OUTPATIENT
Start: 2017-06-30 | End: 2017-06-30 | Stop reason: HOSPADM

## 2017-06-30 RX ORDER — ONDANSETRON 2 MG/ML
INJECTION INTRAMUSCULAR; INTRAVENOUS AS NEEDED
Status: DISCONTINUED | OUTPATIENT
Start: 2017-06-30 | End: 2017-06-30 | Stop reason: HOSPADM

## 2017-06-30 RX ORDER — DOCUSATE SODIUM 100 MG/1
100 CAPSULE, LIQUID FILLED ORAL
Status: DISCONTINUED | OUTPATIENT
Start: 2017-06-30 | End: 2017-07-02 | Stop reason: HOSPADM

## 2017-06-30 RX ORDER — SODIUM CHLORIDE, SODIUM LACTATE, POTASSIUM CHLORIDE, CALCIUM CHLORIDE 600; 310; 30; 20 MG/100ML; MG/100ML; MG/100ML; MG/100ML
INJECTION, SOLUTION INTRAVENOUS
Status: DISCONTINUED | OUTPATIENT
Start: 2017-06-30 | End: 2017-06-30 | Stop reason: HOSPADM

## 2017-06-30 RX ORDER — ONDANSETRON 2 MG/ML
4 INJECTION INTRAMUSCULAR; INTRAVENOUS
Status: DISCONTINUED | OUTPATIENT
Start: 2017-06-30 | End: 2017-06-30 | Stop reason: SDUPTHER

## 2017-06-30 RX ORDER — HYDROCORTISONE 1 %
CREAM (GRAM) TOPICAL AS NEEDED
Status: DISCONTINUED | OUTPATIENT
Start: 2017-06-30 | End: 2017-07-02 | Stop reason: HOSPADM

## 2017-06-30 RX ORDER — OXYCODONE AND ACETAMINOPHEN 5; 325 MG/1; MG/1
1 TABLET ORAL
Status: DISCONTINUED | OUTPATIENT
Start: 2017-06-30 | End: 2017-07-02 | Stop reason: HOSPADM

## 2017-06-30 RX ORDER — OXYTOCIN/RINGER'S LACTATE 20/1000 ML
PLASTIC BAG, INJECTION (ML) INTRAVENOUS
Status: DISCONTINUED | OUTPATIENT
Start: 2017-06-30 | End: 2017-06-30 | Stop reason: HOSPADM

## 2017-06-30 RX ORDER — NALBUPHINE HYDROCHLORIDE 10 MG/ML
10 INJECTION, SOLUTION INTRAMUSCULAR; INTRAVENOUS; SUBCUTANEOUS
Status: DISCONTINUED | OUTPATIENT
Start: 2017-06-30 | End: 2017-06-30 | Stop reason: HOSPADM

## 2017-06-30 RX ORDER — SODIUM CHLORIDE 0.9 % (FLUSH) 0.9 %
5-10 SYRINGE (ML) INJECTION EVERY 8 HOURS
Status: DISCONTINUED | OUTPATIENT
Start: 2017-06-30 | End: 2017-06-30 | Stop reason: HOSPADM

## 2017-06-30 RX ORDER — AMMONIA 15 % (W/V)
1 AMPUL (EA) INHALATION AS NEEDED
Status: DISCONTINUED | OUTPATIENT
Start: 2017-06-30 | End: 2017-07-02 | Stop reason: HOSPADM

## 2017-06-30 RX ORDER — DEXAMETHASONE SODIUM PHOSPHATE 4 MG/ML
INJECTION, SOLUTION INTRA-ARTICULAR; INTRALESIONAL; INTRAMUSCULAR; INTRAVENOUS; SOFT TISSUE AS NEEDED
Status: DISCONTINUED | OUTPATIENT
Start: 2017-06-30 | End: 2017-06-30 | Stop reason: HOSPADM

## 2017-06-30 RX ORDER — OXYTOCIN/RINGER'S LACTATE 20/1000 ML
PLASTIC BAG, INJECTION (ML) INTRAVENOUS
Status: DISPENSED
Start: 2017-06-30 | End: 2017-07-01

## 2017-06-30 RX ORDER — CEFAZOLIN SODIUM IN 0.9 % NACL 2 G/100 ML
PLASTIC BAG, INJECTION (ML) INTRAVENOUS AS NEEDED
Status: DISCONTINUED | OUTPATIENT
Start: 2017-06-30 | End: 2017-06-30

## 2017-06-30 RX ORDER — OXYCODONE AND ACETAMINOPHEN 5; 325 MG/1; MG/1
2 TABLET ORAL
Status: DISCONTINUED | OUTPATIENT
Start: 2017-06-30 | End: 2017-07-02 | Stop reason: HOSPADM

## 2017-06-30 RX ORDER — NALBUPHINE HYDROCHLORIDE 10 MG/ML
2.5 INJECTION, SOLUTION INTRAMUSCULAR; INTRAVENOUS; SUBCUTANEOUS
Status: ACTIVE | OUTPATIENT
Start: 2017-06-30 | End: 2017-07-01

## 2017-06-30 RX ORDER — KETOROLAC TROMETHAMINE 30 MG/ML
30 INJECTION, SOLUTION INTRAMUSCULAR; INTRAVENOUS
Status: DISPENSED | OUTPATIENT
Start: 2017-06-30 | End: 2017-07-01

## 2017-06-30 RX ORDER — DIPHENHYDRAMINE HCL 25 MG
25 CAPSULE ORAL
Status: DISCONTINUED | OUTPATIENT
Start: 2017-06-30 | End: 2017-07-02 | Stop reason: HOSPADM

## 2017-06-30 RX ORDER — SODIUM CHLORIDE 0.9 % (FLUSH) 0.9 %
5-10 SYRINGE (ML) INJECTION EVERY 8 HOURS
Status: DISCONTINUED | OUTPATIENT
Start: 2017-06-30 | End: 2017-07-02 | Stop reason: HOSPADM

## 2017-06-30 RX ORDER — IBUPROFEN 400 MG/1
800 TABLET ORAL
Status: DISCONTINUED | OUTPATIENT
Start: 2017-06-30 | End: 2017-07-02 | Stop reason: HOSPADM

## 2017-06-30 RX ORDER — TERBUTALINE SULFATE 1 MG/ML
0.25 INJECTION SUBCUTANEOUS AS NEEDED
Status: DISCONTINUED | OUTPATIENT
Start: 2017-06-30 | End: 2017-06-30 | Stop reason: HOSPADM

## 2017-06-30 RX ORDER — KETOROLAC TROMETHAMINE 30 MG/ML
INJECTION, SOLUTION INTRAMUSCULAR; INTRAVENOUS AS NEEDED
Status: DISCONTINUED | OUTPATIENT
Start: 2017-06-30 | End: 2017-06-30 | Stop reason: HOSPADM

## 2017-06-30 RX ORDER — SIMETHICONE 80 MG
80 TABLET,CHEWABLE ORAL
Status: DISCONTINUED | OUTPATIENT
Start: 2017-06-30 | End: 2017-07-02 | Stop reason: HOSPADM

## 2017-06-30 RX ADMIN — CEFAZOLIN 3 G: 1 INJECTION, POWDER, FOR SOLUTION INTRAMUSCULAR; INTRAVENOUS; PARENTERAL at 13:01

## 2017-06-30 RX ADMIN — BUPIVACAINE HYDROCHLORIDE 12 MG: 7.5 INJECTION, SOLUTION EPIDURAL; RETROBULBAR at 13:08

## 2017-06-30 RX ADMIN — Medication: at 13:35

## 2017-06-30 RX ADMIN — MORPHINE SULFATE 10 MG: 10 INJECTION, SOLUTION INTRAVENOUS at 15:51

## 2017-06-30 RX ADMIN — Medication 10 ML: at 21:11

## 2017-06-30 RX ADMIN — SODIUM CHLORIDE, SODIUM LACTATE, POTASSIUM CHLORIDE, CALCIUM CHLORIDE: 600; 310; 30; 20 INJECTION, SOLUTION INTRAVENOUS at 12:59

## 2017-06-30 RX ADMIN — SODIUM CHLORIDE, SODIUM LACTATE, POTASSIUM CHLORIDE, AND CALCIUM CHLORIDE 125 ML/HR: 600; 310; 30; 20 INJECTION, SOLUTION INTRAVENOUS at 12:39

## 2017-06-30 RX ADMIN — MORPHINE SULFATE 20 MCG: 0.5 INJECTION, SOLUTION EPIDURAL; INTRATHECAL; INTRAVENOUS at 13:08

## 2017-06-30 RX ADMIN — KETOROLAC TROMETHAMINE 30 MG: 30 INJECTION, SOLUTION INTRAMUSCULAR at 21:11

## 2017-06-30 RX ADMIN — SODIUM CHLORIDE, SODIUM LACTATE, POTASSIUM CHLORIDE, AND CALCIUM CHLORIDE 125 ML/HR: 600; 310; 30; 20 INJECTION, SOLUTION INTRAVENOUS at 22:34

## 2017-06-30 RX ADMIN — DEXAMETHASONE SODIUM PHOSPHATE 10 MG: 4 INJECTION, SOLUTION INTRA-ARTICULAR; INTRALESIONAL; INTRAMUSCULAR; INTRAVENOUS; SOFT TISSUE at 13:41

## 2017-06-30 RX ADMIN — ONDANSETRON 4 MG: 2 INJECTION INTRAMUSCULAR; INTRAVENOUS at 13:12

## 2017-06-30 RX ADMIN — PROMETHAZINE HYDROCHLORIDE 12.5 MG: 25 INJECTION INTRAMUSCULAR; INTRAVENOUS at 18:57

## 2017-06-30 RX ADMIN — SODIUM CHLORIDE, SODIUM LACTATE, POTASSIUM CHLORIDE, AND CALCIUM CHLORIDE 125 ML/HR: 600; 310; 30; 20 INJECTION, SOLUTION INTRAVENOUS at 10:56

## 2017-06-30 RX ADMIN — KETOROLAC TROMETHAMINE 30 MG: 30 INJECTION, SOLUTION INTRAMUSCULAR; INTRAVENOUS at 14:07

## 2017-06-30 NOTE — ROUTINE PROCESS
1700: TRANSFER - IN REPORT:    Verbal report received from Nahomy Pierre RN(name) on Dayanara Hutson  being received from L&D(unit) for routine progression of care      Report consisted of patients Situation, Background, Assessment and   Recommendations(SBAR). Information from the following report(s) SBAR, Kardex, OR Summary, Procedure Summary, Intake/Output, MAR and Recent Results was reviewed with the receiving nurse. Opportunity for questions and clarification was provided. Assessment completed upon patients arrival to unit and care assumed.

## 2017-06-30 NOTE — ANESTHESIA POSTPROCEDURE EVALUATION
Post-Anesthesia Evaluation and Assessment    Patient: Iona Salinas MRN: [id number]  SSN: [id number]    YOB: 1985  Age: 34 y.o. Sex: female       Cardiovascular Function/Vital Signs  Visit Vitals    BP 96/46    Pulse 72    Temp 36.7 °C (98.1 °F)    Resp 20    Ht 5' 5\" (1.651 m)    Wt 127.5 kg (281 lb)    SpO2 99%    Breastfeeding Unknown    BMI 46.76 kg/m2       Patient is status post spinal anesthesia for Procedure(s):   SECTION. Nausea/Vomiting: None    Postoperative hydration reviewed and adequate. Pain:  Pain Scale 1: Numeric (0 - 10) (17 142)  Pain Intensity 1: 0 (17)   Managed    Neurological Status:   Neuro (WDL): Within Defined Limits (17 142)   At baseline    Mental Status and Level of Consciousness: Arousable    Pulmonary Status:   O2 Device: Room air (17 142)   Adequate oxygenation and airway patent    Complications related to anesthesia: None    Post-anesthesia assessment completed.  No concerns    Signed By: Kunal Acosta MD     2017

## 2017-06-30 NOTE — PROGRESS NOTES
1000 Pt is a Pt of Dr Roxy Lopez and is a  admitted to labor and delivery for repeat . Pt denies headache, dizziness, leaking fluid, and or bleeding. Pt placed in labor and delivery bed 12.     1010 Shave and chlorhexidine wipes done. 1022 Pt placed on external fetal monitors. 1056 IV placed      1250 Pt to OR#1.    1420 Back to L&D room12.     1545 Bedbath given. New peripad applied. 1610 MIU notified pt ready to transfer. 1637 Pt transfer via stretcher to MIU.    1700 TRANSFER - OUT REPORT:    Verbal report given to [facility] RN(name) on [employer]  being transferred to [facility](unit) for routine post - op       Report consisted of patients Situation, Background, Assessment and   Recommendations(SBAR). Information from the following report(s) SBAR, Intake/Output and MAR was reviewed with the receiving nurse. Lines:   Peripheral IV 17 Right Hand (Active)        Opportunity for questions and clarification was provided.       Patient transported with:   Registered Nurse

## 2017-06-30 NOTE — IP AVS SNAPSHOT
[address] [facility] [address] 
[phone] Patient: Freddy Guevara MRN: [id number] ZFL: You are allergic to the following No active allergies Recent Documentation Height Weight Breastfeeding? BMI OB Status Smoking Status 1.651 m 127.5 kg Unknown 46.76 kg/m2 Recent pregnancy Never Smoker Emergency Contacts Name Discharge Info Relation Home Work Mobile 1 [facility] CAREGIVER [3] Parent [1] [phone] About your hospitalization You were admitted on:  2017 You last received care in the:  [address] [facility] You were discharged on:  2017 Unit phone number:  [phone] Why you were hospitalized Your primary diagnosis was:  Not on File Your diagnoses also included:  Previous  Section Providers Seen During Your Hospitalizations Provider Role Specialty Primary office phone Avery Gordillo MD Attending Provider Obstetrics & Gynecology [phone] Your Primary Care Physician (PCP) Primary Care Physician Office Phone Office Fax Angie Dang [phone] Follow-up Information Follow up With Details Comments Contact Info Avery Gordillo MD Schedule an appointment as soon as possible for a visit in [address] [place] [address] [address] 
[phone] Current Discharge Medication List  
  
START taking these medications Dose & Instructions Dispensing Information Comments Morning Noon Evening Bedtime  
 ibuprofen 800 mg tablet Commonly known as:  MOTRIN Your last dose was:  10:31 am 17 Your next dose is:  6:31 pm 17 Dose:  800 mg Take 1 Tab by mouth every eight (8) hours as needed. Quantity:  30 Tab Refills:  0  
     
   
   
   
  
 oxyCODONE-acetaminophen 5-325 mg per tablet Commonly known as:  PERCOCET  
 Your last dose was:  10:31 am 17 Your next dose is:  2:31 pm 17 Dose:  2 Tab Take 2 Tabs by mouth every four (4) hours as needed. Max Daily Amount: 12 Tabs. Quantity:  30 Tab Refills:  0  
     
   
   
   
  
 sertraline 50 mg tablet Commonly known as:  ZOLOFT Dose:  50 mg Take 1 Tab by mouth daily. Quantity:  30 Tab Refills:  0 CONTINUE these medications which have NOT CHANGED Dose & Instructions Dispensing Information Comments Morning Noon Evening Bedtime  
 prenatal multivit-ca-min-fe-fa Tab Commonly known as:  PRENATAL VITAMIN Your next dose is:  17 Dose:  1 Tab Take 1 Tab by mouth daily. Quantity:  30 Tab Refills:  0 STOP taking these medications NexIUM 40 mg capsule Generic drug:  esomeprazole  
   
  
 ondansetron hcl 4 mg tablet Commonly known as:  ZOFRAN (AS HYDROCHLORIDE) promethazine 25 mg tablet Commonly known as:  PHENERGAN  
   
  
 ZANTAC 150 mg tablet Generic drug:  raNITIdine Where to Get Your Medications Information on where to get these meds will be given to you by the nurse or doctor. ! Ask your nurse or doctor about these medications  
  ibuprofen 800 mg tablet  
 oxyCODONE-acetaminophen 5-325 mg per tablet  
 sertraline 50 mg tablet Discharge Instructions  Section: What to Expect at [facility] Your Recovery A  section, or , is surgery to deliver your baby through a cut, called an incision, that the doctor makes in your lower belly and uterus. You may have some pain in your lower belly and need pain medicine for 1 to 2 weeks. You can expect some vaginal bleeding for several weeks. You will probably need about 6 weeks to fully recover. It is important to take it easy while the incision is healing.  Avoid heavy lifting, strenuous activities, or exercises that strain the belly muscles while you are recovering. Ask a family member or friend for help with housework, cooking, and shopping. This care sheet gives you a general idea about how long it will take for you to recover. But each person recovers at a different pace. Follow the steps below to get better as quickly as possible. How can you care for yourself at home? Activity · Rest when you feel tired. Getting enough sleep will help you recover. · Try to walk each day. Start by walking a little more than you did the day before. Bit by bit, increase the amount you walk. Walking boosts blood flow and helps prevent pneumonia, constipation, and blood clots. · Avoid strenuous activities, such as bicycle riding, jogging, weightlifting, and aerobic exercise, for 6 weeks or until your doctor says it is okay. · Until your doctor says it is okay, do not lift anything heavier than your baby. · Do not do sit-ups or other exercises that strain the belly muscles for 6 weeks or until your doctor says it is okay. · Hold a pillow over your incision when you cough or take deep breaths. This will support your belly and decrease your pain. · You may shower as usual. Pat the incision dry when you are done. · You will have some vaginal bleeding. Wear sanitary pads. Do not douche or use tampons until your doctor says it is okay. · Ask your doctor when you can drive again. · You will probably need to take at least 6 weeks off work. It depends on the type of work you do and how you feel. · Ask your doctor when it is okay for you to have sex. Diet · You can eat your normal diet. If your stomach is upset, try bland, low-fat foods like plain rice, broiled chicken, toast, and yogurt. · Drink plenty of fluids (unless your doctor tells you not to). · You may notice that your bowel movements are not regular right after your surgery. This is common.  Try to avoid constipation and straining with bowel movements. You may want to take a fiber supplement every day. If you have not had a bowel movement after a couple of days, ask your doctor about taking a mild laxative. · If you are breastfeeding, do not drink any alcohol. Medicines · Your doctor will tell you if and when you can restart your medicines. He or she will also give you instructions about taking any new medicines. · If you take blood thinners, such as warfarin (Coumadin), clopidogrel (Plavix), or aspirin, be sure to talk to your doctor. He or she will tell you if and when to start taking those medicines again. Make sure that you understand exactly what your doctor wants you to do. · Take pain medicines exactly as directed. ¨ If the doctor gave you a prescription medicine for pain, take it as prescribed. ¨ If you are not taking a prescription pain medicine, ask your doctor if you can take an over-the-counter medicine. · If you think your pain medicine is making you sick to your stomach: 
¨ Take your medicine after meals (unless your doctor has told you not to). ¨ Ask your doctor for a different pain medicine. · If your doctor prescribed antibiotics, take them as directed. Do not stop taking them just because you feel better. You need to take the full course of antibiotics. Incision care · If you have strips of tape on the incision, leave the tape on for a week or until it falls off. · Wash the area daily with warm, soapy water, and pat it dry. Don't use hydrogen peroxide or alcohol, which can slow healing. You may cover the area with a gauze bandage if it weeps or rubs against clothing. Change the bandage every day. · Keep the area clean and dry. Other instructions · If you breastfeed your baby, you may be more comfortable while you are healing if you place the baby so that he or she is not resting on your belly.  Try tucking your baby under your arm, with his or her body along the side you will be feeding on. Support your baby's upper body with your arm. With that hand you can control your baby's head to bring his or her mouth to your breast. This is sometimes called the football hold. Follow-up care is a key part of your treatment and safety. Be sure to make and go to all appointments, and call your doctor if you are having problems. It's also a good idea to know your test results and keep a list of the medicines you take. When should you call for help? Call 911 anytime you think you may need emergency care. For example, call if: 
· You passed out (lost consciousness). · You have symptoms of a blood clot in your lung (called a pulmonary embolism). These may include: 
¨ Sudden chest pain. ¨ Trouble breathing. ¨ Coughing up blood. · You have thoughts of harming yourself, your baby, or another person. Call your doctor now or seek immediate medical care if: 
· You have severe vaginal bleeding. This means that you are soaking through a pad every hour for 2 or more hours. · You are dizzy or lightheaded, or you feel like you may faint. · You have new or more belly pain. · You have loose stitches, or your incision comes open. · You have symptoms of infection, such as: 
¨ Increased pain, swelling, warmth, or redness. ¨ Red streaks leading from the incision. ¨ Pus draining from the incision. ¨ A fever. · You have symptoms of a blood clot in your leg (called a deep vein thrombosis), such as: 
¨ Pain in your calf, back of the knee, thigh, or groin. ¨ Redness and swelling in your leg or groin. Watch closely for changes in your health, and be sure to contact your doctor if: 
· You feel sad, anxious, or hopeless for more than a few days. · You do not get better as expected. Where can you learn more? Go to http://darrell-saman.info/. Enter M806 in the search box to learn more about \" Section: What to Expect at Home. \" Current as of: 2017 Content Version: 11.3 © 7581-6066 [email or web address]. Care instructions adapted under license by [employer] (which disclaims liability or warranty for this information). If you have questions about a medical condition or this instruction, always ask your healthcare professional. [address] any warranty or liability for your use of this information. POSTPARTUM DISCHARGE INSTRUCTIONS Name:  Nasreen Casillas YOB: 1985 Admission Diagnosis:  REPEAT  Previous  section Discharge Diagnosis:   
Problem List as of 2017  Date Reviewed: 10/6/2014 Codes Class Noted - Resolved Previous  section ICD-10-CM: S19.723 ICD-9-CM: V45.89  2017 - Present Twins ICD-10-CM: Z38.5 ICD-9-CM: V27.9  2014 - Present Other specified complication, antepartum ICD-10-CM: O99.89 ICD-9-CM: 875.50  2014 - Present H/O  section ICD-10-CM: O91.338 ICD-9-CM: V45.89  10/18/2013 - Present Breech presentation ICD-10-CM: O32. 1XX0 
ICD-9-CM: 652.20  10/18/2013 - Present Obese ICD-10-CM: E66.9 ICD-9-CM: 278.00  2013 - Present Knee pain, left ICD-10-CM: I84.412 ICD-9-CM: 719.46  2013 - Present RESOLVED: Previous  delivery affecting pregnancy ICD-10-CM: O34.219 ICD-9-CM: 654.20  10/6/2014 - 10/9/2014 Attending Physician:  Josue Rowan MD 
 
Delivery Type:   Section: What to Expect at [facility] Your Recovery: A  section, or , is surgery to deliver your baby through a cut, called an incision that the doctor makes in your lower belly and uterus. You may have some pain in your lower belly and need pain medicine for 1 to 2 weeks. You can expect some vaginal bleeding for several weeks. You will probably need about 6 weeks to fully recover. It is important to take it easy while the incision is healing.  Avoid heavy lifting, strenuous activities, or exercises that strain the belly muscles while you are recovering. Ask a family member or friend for help with housework, cooking, and shopping. This care sheet gives you a general idea about how long it will take for you to recover. But each person recovers at a different pace. Follow the steps below to get better as quickly as possible. How can you care for yourself at home? Activity · Rest when you feel tired. Getting enough sleep will help you recover. · Try to walk each day. Start by walking a little more than you did the day before. Bit by bit, increase the amount you walk. Walking boosts blood flow and helps prevent pneumonia, constipation, and blood clots. · Avoid strenuous activities, such as bicycle riding, jogging, weightlifting, and aerobic exercise, for 6 weeks or until your doctor says it is okay. · Until your doctor says it is okay, do not lift anything heavier than your baby. · Do not do sit-ups or other exercise that strain the belly muscles for 6 weeks or until your doctor says it is okay. · Hold a pillow over your incision when you cough or take deep breaths. This will support your belly and decrease your pain. · You may shower as usual. Pat the incision dry when you are done. · You will have some vaginal bleeding. Wear sanitary pads. Do not douche or use tampons until your doctor says it is okay. · Ask your doctor when you can drive again. · You will probably need to take at least 6 weeks off work. It depends on the type of work you do and how you feel. · Wait until you are healed (about 4 to 6 weeks) before you have sexual intercourse. Your doctor will tell you when it is okay to have sex. · Talk to your doctor about birth control. You can get pregnant even before your period returns. Also, you can get pregnant while you are breast-feeding. Incision care Your skin is your body's first line of defense against germs, but an incision site leaves an easy way for germs to enter your body. To prevent infection: · Clean your hands frequently and before and after changing any touching any dressings. · If you have strips of tape on the incision, leave the tape on for a week or until it falls off. · Look at your incision closely every day for any changes. Contact your doctor if you experience any signs of infection, such as fever or increased redness at the surgical site. · Wash the area daily with warm, soapy water, and pat it dry. Don't use hydrogen peroxide or alcohol, which can slow healing. You may cover the area with a gauze bandage if it weeps or rubs against clothing. Change the bandage every day. · Keep the area clean and dry. Diet · You can eat your normal diet. If your stomach is upset, try bland, low-fat foods like plain rice, broiled chicken, toast, and yogurt. · Drink plenty of fluids (unless your doctor tells you not to). · You may notice that your bowel movements are not regular right after your surgery. This is common. Try to avoid constipation and straining with bowel movements. You may want to take a fiber supplement every day. If you have not had a bowel movement after a couple of days, ask your doctor about taking a mild laxative. · If you are breast-feeding, do not drink any alcohol. Medicines · Take pain medicines exactly as directed. · If the doctor gave you a prescription medicine for pain, take it as prescribed. · If you are not taking a prescription pain medicine, ask your doctor if you can take an over-the-counter medicine such as acetaminophen (Tylenol), ibuprofen (Advil, Motrin), or naproxen (Aleve), for cramps. Read and follow all instructions on the label. Do not take aspirin, because it can cause more bleeding. Do not take acetaminophen (Tylenol) and other acetaminophen containing medications (i.e. Percocet) at the same time. · If you think your pain medicine is making you sick to your stomach: 
· Take your medicine after meals (unless your doctor has told you not to). · Ask your doctor for a different pain medicine. · If your doctor prescribed antibiotics, take them as directed. Do not stop taking them just because you feel better. You need to take the full course of antibiotics. Mental Health · Many women get the \"baby blues\" during the first few days after childbirth. You may lose sleep, feel irritable, and cry easily. You may feel happy one minute and sad the next. Hormone changes are one cause of these emotional changes. Also, the demands of a new baby, along with visits from relatives or other family needs, add to a mother's stress. The \"baby blues\" often peak around the fourth day. Then they ease up in less than 2 weeks. · If your moodiness or anxiety lasts for more than 2 weeks, or if you feel like life is not worth living, you may have postpartum depression. This is different for each mother. Some mothers with serious depression may worry intensely about their infant's well-being. Others may feel distant from their child. Some mothers might even feel that they might harm their baby. A mother may have signs of paranoia, wondering if someone is watching her. · With all the changes in your life, you may not know if you are depressed. Pregnancy sometimes causes changes in how you feel that are similar to the symptoms of depression. · Symptoms of depression include: · Feeling sad or hopeless and losing interest in daily activities. These are the most common symptoms of depression. · Sleeping too much or not enough. · Feeling tired. You may feel as if you have no energy. · Eating too much or too little. · POSTPARTUM SUPPORT INTERNATIONAL (PSI) offers a Warm line; Chat with the Expert phone sessions; Information and Articles about Pregnancy and Postpartum Mood Disorders;  Comprehensive List of Free Support Groups; Knowledgeable local coordinators who will offer support, information, and resources; Guide to Resources on [employer]; Calendar of events in the  mood disorders community; Latest News and Research; and [facility] [address] for [employer]. Remember - You are not alone; You are not to blame; With help, you will be well. 7-991-662-PPD(5173). WWW. POSTPARTUM. NET · Writing or talking about death, such as writing suicide notes or talking about guns, knives, or pills. Keep the numbers for these national suicide hotlines: 9-061-035-TALK (9-613.652.6048) and 0-960-UXAMQEM (6-991.947.9513). If you or someone you know talks about suicide or feeling hopeless, get help right away. Other instructions · If you breast-feed your baby, you may be more comfortable while you are healing if you place the baby so that he or she is not resting on your belly. Try tucking your baby under your arm, with his or her body along the side you will be feeding on. Support your baby's upper body with your arm. With that hand you can control your baby's head to bring his or her mouth to your breast. This is sometimes called the football hold. Follow-up care is a key part of your treatment and safety. Be sure to make and go to all appointments, and call your doctor if you are having problems. It's also a good idea to know your test results and keep a list of the medicines you take. When should you call for help? Call 911 anytime you think you may need emergency care. For example, call if: 
· You are thinking of hurting yourself, your baby, or anyone else. · You passed out (lost consciousness). · You have symptoms of a blood clot in your lung (called a pulmonary embolism). These may include: 
· Sudden chest pain. · Trouble breathing. · Coughing up blood. Call your doctor now or seek immediate medical care if: 
 
· You have severe vaginal bleeding. · You are soaking through a pad each hour for 2 or more hours. · Your vaginal bleeding seems to be getting heavier or is still bright red 4 days after delivery. · You are dizzy or lightheaded, or you feel like you may faint. · You are vomiting or cannot keep fluids down. · You have a fever. · You have new or more belly pain. · You have loose stitches, or your incision comes open. · You have symptoms of infection, such as: 
· Increased pain, swelling, warmth, or redness. · Red streaks leading from the incision. · Pus draining from the incision. · A fever · You pass tissue (not just blood). · Your vaginal discharge smells bad. · Your belly feels tender or full and hard. · Your breasts are continuously painful or red. · You feel sad, anxious, or hopeless for more than a few days. · You have sudden, severe pain in your belly. · You have symptoms of a blood clot in your leg (called a deep vein thrombosis), such as: 
· Pain in your calf, back of the knee, thigh, or groin. · Redness and swelling in your leg or groin. · You have symptoms of preeclampsia, such as: 
· Sudden swelling of your face, hands, or feet. · New vision problems (such as dimness or blurring). · A severe headache. · Your blood pressure is higher than it should be or rises suddenly. · You have new nausea or vomiting. Watch closely for changes in your health, and be sure to contact your doctor if you have any problems. Additional Information:  Not applicable These are general instructions for a healthy lifestyle: No smoking/ No tobacco products/ Avoid exposure to second hand smoke Surgeon General's Warning:  Quitting smoking now greatly reduces serious risk to your health. Obesity, smoking, and sedentary lifestyle greatly increases your risk for illness A healthy diet, regular physical exercise & weight monitoring are important for maintaining a healthy lifestyle Recognize signs and symptoms of STROKE: 
 
F-face looks uneven A-arms unable to move or move unevenly S-speech slurred or non-existent T-time-call 911 as soon as signs and symptoms begin - DO NOT go  
    back to bed or wait to see if you get better - TIME IS BRAIN. I have had the opportunity to make my options or choices for discharge. I have received and understand these instructions. Discharge Orders None [employer] Announcement We are excited to announce that we are making your provider's discharge notes available to you in [employer]. You will see these notes when they are completed and signed by the physician that discharged you from your recent hospital stay. If you have any questions or concerns about any information you see in [employer], please call the Health Information Department where you were seen or reach out to your Primary Care Provider for more information about your plan of care. Introducing [facility]! Lemuel Faulkner introduces [employer] patient portal. Now you can access parts of your medical record, email your doctor's office, and request medication refills online. 1. In your internet browser, go to [email or web address] 2. Click on the First Time User? Click Here link in the Sign In box. You will see the New Member Sign Up page. 3. Enter your [employer] Access Code exactly as it appears below. You will not need to use this code after youve completed the sign-up process. If you do not sign up before the expiration date, you must request a new code. · [employer] Access Code: [email or web address] Expires: 9/11/2017  5:37 AM 
 
4. Enter the last four digits of your Social Security Number (xxxx) and Date of Birth (mm/dd/yyyy) as indicated and click Submit. You will be taken to the next sign-up page. 5. Create a [employer] ID. This will be your [employer] login ID and cannot be changed, so think of one that is secure and easy to remember. 6. Create a [employer] password. You can change your password at any time. 7. Enter your Password Reset Question and Answer. This can be used at a later time if you forget your password. 8. Enter your e-mail address. You will receive e-mail notification when new information is available in [address] 9. Click Sign Up. You can now view and download portions of your medical record. 10. Click the Download Summary menu link to download a portable copy of your medical information. If you have questions, please visit the Frequently Asked Questions section of the nanoMR website. Remember, nanoMR is NOT to be used for urgent needs. For medical emergencies, dial 911. Now available from your iPhone and Android! General Information Please provide this summary of care documentation to your next provider. Patient Signature:  ____________________________________________________________ Date:  ____________________________________________________________  
  
Reinaldo Menendez Provider Signature:  ____________________________________________________________ Date:  ____________________________________________________________

## 2017-06-30 NOTE — ANESTHESIA PREPROCEDURE EVALUATION
Anesthetic History   No history of anesthetic complications            Review of Systems / Medical History  Patient summary reviewed, nursing notes reviewed and pertinent labs reviewed    Pulmonary  Within defined limits                 Neuro/Psych   Within defined limits           Cardiovascular  Within defined limits                     GI/Hepatic/Renal  Within defined limits              Endo/Other        Morbid obesity     Other Findings              Physical Exam    Airway  Mallampati: II  TM Distance: > 6 cm  Neck ROM: normal range of motion   Mouth opening: Normal     Cardiovascular  Regular rate and rhythm,  S1 and S2 normal,  no murmur, click, rub, or gallop             Dental  No notable dental hx       Pulmonary  Breath sounds clear to auscultation               Abdominal  GI exam deferred       Other Findings            Anesthetic Plan    ASA: 3  Anesthesia type: spinal          Induction: Intravenous  Anesthetic plan and risks discussed with: Patient

## 2017-06-30 NOTE — ANESTHESIA PROCEDURE NOTES
Spinal Block    Start time: 6/30/2017 1:00 PM  End time: 6/30/2017 1:07 PM  Performed by: Marcela Webb  Authorized by: Marcela Webb     Pre-procedure:   Indications: primary anesthetic  Preanesthetic Checklist: patient identified, risks and benefits discussed, anesthesia consent, patient being monitored and timeout performed    Timeout Time: 13:00          Spinal Block:   Patient Position:  Seated    Prep: Betadine      Location:  L3-4  Technique:  Single shot        Needle:   Needle Type:  Pencil-tip  Needle Gauge:  25 G  Attempts:  1      Events: CSF confirmed, no blood with aspiration and no paresthesia        Assessment:  Insertion:  Uncomplicated  Patient tolerance:  Patient tolerated the procedure well with no immediate complications

## 2017-07-01 LAB
BASOPHILS # BLD AUTO: 0 K/UL (ref 0–0.1)
BASOPHILS # BLD: 0 % (ref 0–1)
EOSINOPHIL # BLD: 0 K/UL (ref 0–0.4)
EOSINOPHIL NFR BLD: 0 % (ref 0–7)
ERYTHROCYTE [DISTWIDTH] IN BLOOD BY AUTOMATED COUNT: 13.8 % (ref 11.5–14.5)
HCT VFR BLD AUTO: 30.6 % (ref 35–47)
HGB BLD-MCNC: 10.3 G/DL (ref 11.5–16)
LYMPHOCYTES # BLD AUTO: 13 % (ref 12–49)
LYMPHOCYTES # BLD: 1.8 K/UL (ref 0.8–3.5)
MCH RBC QN AUTO: 28.1 PG (ref 26–34)
MCHC RBC AUTO-ENTMCNC: 33.7 G/DL (ref 30–36.5)
MCV RBC AUTO: 83.6 FL (ref 80–99)
MONOCYTES # BLD: 1.2 K/UL (ref 0–1)
MONOCYTES NFR BLD AUTO: 9 % (ref 5–13)
NEUTS SEG # BLD: 10.7 K/UL (ref 1.8–8)
NEUTS SEG NFR BLD AUTO: 78 % (ref 32–75)
PLATELET # BLD AUTO: 170 K/UL (ref 150–400)
RBC # BLD AUTO: 3.66 M/UL (ref 3.8–5.2)
WBC # BLD AUTO: 13.8 K/UL (ref 3.6–11)

## 2017-07-01 PROCEDURE — 74011250636 HC RX REV CODE- 250/636: Performed by: ANESTHESIOLOGY

## 2017-07-01 PROCEDURE — 74011250637 HC RX REV CODE- 250/637: Performed by: OBSTETRICS & GYNECOLOGY

## 2017-07-01 PROCEDURE — 36415 COLL VENOUS BLD VENIPUNCTURE: CPT | Performed by: OBSTETRICS & GYNECOLOGY

## 2017-07-01 PROCEDURE — 65410000002 HC RM PRIVATE OB

## 2017-07-01 PROCEDURE — 85025 COMPLETE CBC W/AUTO DIFF WBC: CPT | Performed by: OBSTETRICS & GYNECOLOGY

## 2017-07-01 RX ADMIN — SERTRALINE HYDROCHLORIDE 50 MG: 50 TABLET ORAL at 09:29

## 2017-07-01 RX ADMIN — IBUPROFEN 800 MG: 400 TABLET, FILM COATED ORAL at 18:13

## 2017-07-01 RX ADMIN — Medication 10 ML: at 06:00

## 2017-07-01 RX ADMIN — OXYCODONE HYDROCHLORIDE AND ACETAMINOPHEN 1 TABLET: 5; 325 TABLET ORAL at 21:33

## 2017-07-01 RX ADMIN — KETOROLAC TROMETHAMINE 30 MG: 30 INJECTION, SOLUTION INTRAMUSCULAR at 11:46

## 2017-07-01 RX ADMIN — KETOROLAC TROMETHAMINE 30 MG: 30 INJECTION, SOLUTION INTRAMUSCULAR at 06:00

## 2017-07-01 NOTE — PROGRESS NOTES
Bedside shift change report given to Yannick Morales RN (oncoming nurse) by ANKIT Lopez RN (offgoing nurse). Report included the following information SBAR.

## 2017-07-01 NOTE — ROUTINE PROCESS
1600: Bedside and Verbal shift change report given to ANKIT Traore (oncoming nurse) by Francesco Pérez RN (offgoing nurse). Report included the following information SBAR, Intake/Output, MAR and Recent Results.

## 2017-07-01 NOTE — LACTATION NOTE
This note was copied from a baby's chart.     Couplet Interdisciplinary Rounds     MATERNAL    Daily Goal:     Influenza screening completed: NA   Tdap screening completed: YES   Rhogam Given:N/A  MMR Given:N/A    VTE Prophylaxis: Not indicated, per Provider order    EPDS:            Patient Name: Ajit June Diagnosis: [place]  Normal  (single liveborn)   Date of Admission: 2017 LOS: 1  Gestational Age: Gestational Age: 39w0d       Daily Goal:     Birth Weight: 3.615 kg Current Weight: Weight: 3.615 kg (Filed from Delivery Summary)  % of Weight Change: 0%    Feeding:   Metabolic Screen: NO    Hepatitis B:  NO    Discharge Bili:  NO  Car Seat Trial, if needed:  N/A      Patient/Family Teaching Needs:     Days before discharge: Two or more days before discharge    In Attendance:  Nursing and Physician

## 2017-07-01 NOTE — PROGRESS NOTES
Post-Operative  Day 1    Natalee Verduzco     Assessment: Post-Op day 1, stable    Plan:   1. Routine post-operative care   2. The risks and benefits of the circumcision  procedure and anesthesia including: bleeding, infection, variability of cosmetic results were discussed at length with the mother. She is aware that future repeat procedures may be necessary. She gives informed consent to proceed as noted and her questions are answered. Information for the patient's :  Tosha Garg [[id number]]   , Low Transverse   Patient doing well without significant complaint. Nausea and vomiting resolved, tolerating PO, no flatus. Vitals:  Visit Vitals    /60 (BP 1 Location: Left arm, BP Patient Position: At rest)    Pulse 72    Temp 98 °F (36.7 °C)    Resp 16    Ht 5' 5\" (1.651 m)    Wt 127.5 kg (281 lb)    SpO2 97%    Breastfeeding Unknown    BMI 46.76 kg/m2     Temp (24hrs), Av.2 °F (36.2 °C), Min:94.1 °F (34.5 °C), Max:98.1 °F (36.7 °C)      Last 24hr Input/Output:    Intake/Output Summary (Last 24 hours) at 17 0929  Last data filed at 17 0135   Gross per 24 hour   Intake              500 ml   Output              850 ml   Net             -350 ml          Exam:        Patient without distress. Lungs clear. Abdomen, bowel sounds present, soft, expected tenderness, fundus firm Wound dressing intact               Lower extremities are symmetric without tenderness, cords or erythema.     Labs:   Lab Results   Component Value Date/Time    WBC 13.8 2017 06:20 AM    WBC 8.8 2017 08:49 AM    WBC 13.5 10/07/2014 04:20 AM    WBC 11.9 10/06/2014 06:50 AM    WBC 16.6 10/19/2013 04:15 AM    WBC 22.4 10/18/2013 02:25 PM    WBC 13.0 10/18/2013 06:45 AM    HGB 10.3 2017 06:20 AM    HGB 11.3 2017 08:49 AM    HGB 10.7 10/07/2014 04:20 AM    HGB 12.6 10/06/2014 06:50 AM    HGB 11.8 10/19/2013 04:15 AM    HGB 11.9 10/18/2013 02:25 PM    HGB 12.7 10/18/2013 06:45 AM    HCT 30.6 07/01/2017 06:20 AM    HCT 33.6 06/29/2017 08:49 AM    HCT 32.4 10/07/2014 04:20 AM    HCT 37.0 10/06/2014 06:50 AM    HCT 35.1 10/19/2013 04:15 AM    HCT 34.5 10/18/2013 02:25 PM    HCT 36.8 10/18/2013 06:45 AM    PLATELET 449 63/02/9187 06:20 AM    PLATELET 179 85/43/1578 08:49 AM    PLATELET 746 24/44/9127 04:20 AM    PLATELET 909 32/76/0124 06:50 AM    PLATELET 154 98/83/9568 04:15 AM    PLATELET 227 63/02/5091 02:25 PM    PLATELET 520 12/25/3536 06:45 AM       Recent Results (from the past 24 hour(s))   CBC WITH AUTOMATED DIFF    Collection Time: 07/01/17  6:20 AM   Result Value Ref Range    WBC 13.8 (H) 3.6 - 11.0 K/uL    RBC 3.66 (L) 3.80 - 5.20 M/uL    HGB 10.3 (L) 11.5 - 16.0 g/dL    HCT 30.6 (L) 35.0 - 47.0 %    MCV 83.6 80.0 - 99.0 FL    MCH 28.1 26.0 - 34.0 PG    MCHC 33.7 30.0 - 36.5 g/dL    RDW 13.8 11.5 - 14.5 %    PLATELET 293 004 - 353 K/uL    NEUTROPHILS 78 (H) 32 - 75 %    LYMPHOCYTES 13 12 - 49 %    MONOCYTES 9 5 - 13 %    EOSINOPHILS 0 0 - 7 %    BASOPHILS 0 0 - 1 %    ABS. NEUTROPHILS 10.7 (H) 1.8 - 8.0 K/UL    ABS. LYMPHOCYTES 1.8 0.8 - 3.5 K/UL    ABS. MONOCYTES 1.2 (H) 0.0 - 1.0 K/UL    ABS. EOSINOPHILS 0.0 0.0 - 0.4 K/UL    ABS.  BASOPHILS 0.0 0.0 - 0.1 K/UL

## 2017-07-01 NOTE — PROGRESS NOTES
Anesthesia Post Operative Day 1      The patient is status post C Section with spinal  anesthesia and Duramorph for pain. The patient relates the following scales: pain,none ; itching, none ; nausea, none. All sympyoms were treated with medications per protocol. The patient is up and ambulating and has minimal complaints. Plan: Continue to treat breakthrough symptoms as needed with protocol medications.

## 2017-07-01 NOTE — ROUTINE PROCESS
Bedside SBAR received from Skyler Hardin RN. Parents educated on safe sleep environment for . Verbalized understanding. Do parents have a safe sleep environment:YES    Parents request a Baby Box:NO      If Baby Box requested must complete and check all below:       [] Nurse reviewed certifcate from videos. [] Baby Box given to parents. [] Education completed on use of Baby Box. [] Referral Form Completed. [] Release Form Signed.      [] Copy of Release Form put in mother's chart     [] Mom sticker put on clipboard

## 2017-07-02 VITALS
DIASTOLIC BLOOD PRESSURE: 58 MMHG | SYSTOLIC BLOOD PRESSURE: 124 MMHG | OXYGEN SATURATION: 97 % | HEIGHT: 65 IN | HEART RATE: 87 BPM | RESPIRATION RATE: 14 BRPM | TEMPERATURE: 98.2 F | BODY MASS INDEX: 46.82 KG/M2 | WEIGHT: 281 LBS

## 2017-07-02 PROCEDURE — 74011250637 HC RX REV CODE- 250/637: Performed by: OBSTETRICS & GYNECOLOGY

## 2017-07-02 RX ORDER — IBUPROFEN 800 MG/1
800 TABLET ORAL
Qty: 30 TAB | Refills: 0 | Status: SHIPPED | OUTPATIENT
Start: 2017-07-02 | End: 2018-10-29

## 2017-07-02 RX ORDER — SERTRALINE HYDROCHLORIDE 50 MG/1
50 TABLET, FILM COATED ORAL DAILY
Qty: 30 TAB | Refills: 0 | Status: SHIPPED | OUTPATIENT
Start: 2017-07-02 | End: 2018-10-29

## 2017-07-02 RX ORDER — OXYCODONE AND ACETAMINOPHEN 5; 325 MG/1; MG/1
2 TABLET ORAL
Qty: 30 TAB | Refills: 0 | Status: SHIPPED | OUTPATIENT
Start: 2017-07-02 | End: 2018-10-29

## 2017-07-02 RX ADMIN — SERTRALINE HYDROCHLORIDE 50 MG: 50 TABLET ORAL at 09:23

## 2017-07-02 RX ADMIN — IBUPROFEN 800 MG: 400 TABLET, FILM COATED ORAL at 02:13

## 2017-07-02 RX ADMIN — OXYCODONE HYDROCHLORIDE AND ACETAMINOPHEN 1 TABLET: 5; 325 TABLET ORAL at 06:38

## 2017-07-02 RX ADMIN — OXYCODONE HYDROCHLORIDE AND ACETAMINOPHEN 1 TABLET: 5; 325 TABLET ORAL at 10:31

## 2017-07-02 RX ADMIN — IBUPROFEN 800 MG: 400 TABLET, FILM COATED ORAL at 10:31

## 2017-07-02 NOTE — DISCHARGE SUMMARY
Obstetrical Discharge Summary     Name: Jennifer Minaya MRN: [id number]  SSN: [id number]    YOB: 1985  Age: 34 y.o. Sex: female      Admit Date: 2017    Discharge Date: 2017     Admitting Physician: Heather Gasca MD     Attending Physician:  Heather Gasca MD     Admission Diagnoses: REPEAT   Previous  section    Discharge Diagnoses:   Information for the patient's :  Remberto Chi [[id number]]   Delivery of a 3.615 kg male infant via , Low Transverse on 2017 at 1:34 PM  by . Apgars were 9 and 9. Additional Diagnoses:   Hospital Problems  Date Reviewed: 10/6/2014          Codes Class Noted POA    Previous  section ICD-10-CM: Z98.891  ICD-9-CM: V45.89  2017 Unknown             Lab Results   Component Value Date/Time    Rubella, External Immune 2.31 2016    GrBStrep, External negative 2017       Hospital Course: Normal hospital course following the delivery. Disposition at Discharge: Home or self care    Discharged Condition: Stable    Patient Instructions:   Current Discharge Medication List      START taking these medications    Details   ibuprofen (MOTRIN) 800 mg tablet Take 1 Tab by mouth every eight (8) hours as needed. Qty: 30 Tab, Refills: 0      oxyCODONE-acetaminophen (PERCOCET) 5-325 mg per tablet Take 2 Tabs by mouth every four (4) hours as needed. Max Daily Amount: 12 Tabs. Qty: 30 Tab, Refills: 0         CONTINUE these medications which have NOT CHANGED    Details   prenatal multivit-ca-min-fe-fa (PRENATAL VITAMIN) Tab Take 1 Tab by mouth daily.   Qty: 30 Tab, Refills: 0         STOP taking these medications       promethazine (PHENERGAN) 25 mg tablet Comments:   Reason for Stopping:         raNITIdine (ZANTAC) 150 mg tablet Comments:   Reason for Stopping:         esomeprazole (NEXIUM) 40 mg capsule Comments:   Reason for Stopping:         ondansetron hcl (ZOFRAN, AS HYDROCHLORIDE,) 4 mg tablet Comments:   Reason for Stopping:               Reference my discharge instructions.     Follow-up Appointments   Procedures    FOLLOW UP VISIT Appointment in: 6 Weeks rufus hooper     Standing Status:   Standing     Number of Occurrences:   1     Order Specific Question:   Appointment in     Answer:   6 Weeks        Signed By:  Kaley Morillo MD     July 2, 2017

## 2017-07-02 NOTE — DISCHARGE INSTRUCTIONS
Section: What to Expect at [address] [place] [address]    A  section, or , is surgery to deliver your baby through a cut, called an incision, that the doctor makes in your lower belly and uterus. You may have some pain in your lower belly and need pain medicine for 1 to 2 weeks. You can expect some vaginal bleeding for several weeks. You will probably need about 6 weeks to fully recover. It is important to take it easy while the incision is healing. Avoid heavy lifting, strenuous activities, or exercises that strain the belly muscles while you are recovering. Ask a family member or friend for help with housework, cooking, and shopping. This care sheet gives you a general idea about how long it will take for you to recover. But each person recovers at a different pace. Follow the steps below to get better as quickly as possible. How can you care for yourself at home? Activity  · Rest when you feel tired. Getting enough sleep will help you recover. · Try to walk each day. Start by walking a little more than you did the day before. Bit by bit, increase the amount you walk. Walking boosts blood flow and helps prevent pneumonia, constipation, and blood clots. · Avoid strenuous activities, such as bicycle riding, jogging, weightlifting, and aerobic exercise, for 6 weeks or until your doctor says it is okay. · Until your doctor says it is okay, do not lift anything heavier than your baby. · Do not do sit-ups or other exercises that strain the belly muscles for 6 weeks or until your doctor says it is okay. · Hold a pillow over your incision when you cough or take deep breaths. This will support your belly and decrease your pain. · You may shower as usual. Pat the incision dry when you are done. · You will have some vaginal bleeding. Wear sanitary pads. Do not douche or use tampons until your doctor says it is okay. · Ask your doctor when you can drive again.   · You will probably need to take at least 6 weeks off work. It depends on the type of work you do and how you feel. · Ask your doctor when it is okay for you to have sex. Diet  · You can eat your normal diet. If your stomach is upset, try bland, low-fat foods like plain rice, broiled chicken, toast, and yogurt. · Drink plenty of fluids (unless your doctor tells you not to). · You may notice that your bowel movements are not regular right after your surgery. This is common. Try to avoid constipation and straining with bowel movements. You may want to take a fiber supplement every day. If you have not had a bowel movement after a couple of days, ask your doctor about taking a mild laxative. · If you are breastfeeding, do not drink any alcohol. Medicines  · Your doctor will tell you if and when you can restart your medicines. He or she will also give you instructions about taking any new medicines. · If you take blood thinners, such as warfarin (Coumadin), clopidogrel (Plavix), or aspirin, be sure to talk to your doctor. He or she will tell you if and when to start taking those medicines again. Make sure that you understand exactly what your doctor wants you to do. · Take pain medicines exactly as directed. ¨ If the doctor gave you a prescription medicine for pain, take it as prescribed. ¨ If you are not taking a prescription pain medicine, ask your doctor if you can take an over-the-counter medicine. · If you think your pain medicine is making you sick to your stomach:  ¨ Take your medicine after meals (unless your doctor has told you not to). ¨ Ask your doctor for a different pain medicine. · If your doctor prescribed antibiotics, take them as directed. Do not stop taking them just because you feel better. You need to take the full course of antibiotics. Incision care  · If you have strips of tape on the incision, leave the tape on for a week or until it falls off. · Wash the area daily with warm, soapy water, and pat it dry.  Don't use hydrogen peroxide or alcohol, which can slow healing. You may cover the area with a gauze bandage if it weeps or rubs against clothing. Change the bandage every day. · Keep the area clean and dry. Other instructions  · If you breastfeed your baby, you may be more comfortable while you are healing if you place the baby so that he or she is not resting on your belly. Try tucking your baby under your arm, with his or her body along the side you will be feeding on. Support your baby's upper body with your arm. With that hand you can control your baby's head to bring his or her mouth to your breast. This is sometimes called the Quot[employer] Book hold. Follow-up care is a key part of your treatment and safety. Be sure to make and go to all appointments, and call your doctor if you are having problems. It's also a good idea to know your test results and keep a list of the medicines you take. When should you call for help? Call 911 anytime you think you may need emergency care. For example, call if:  · You passed out (lost consciousness). · You have symptoms of a blood clot in your lung (called a pulmonary embolism). These may include:  ¨ Sudden chest pain. ¨ Trouble breathing. ¨ Coughing up blood. · You have thoughts of harming yourself, your baby, or another person. Call your doctor now or seek immediate medical care if:  · You have severe vaginal bleeding. This means that you are soaking through a pad every hour for 2 or more hours. · You are dizzy or lightheaded, or you feel like you may faint. · You have new or more belly pain. · You have loose stitches, or your incision comes open. · You have symptoms of infection, such as:  ¨ Increased pain, swelling, warmth, or redness. ¨ Red streaks leading from the incision. ¨ Pus draining from the incision. ¨ A fever.   · You have symptoms of a blood clot in your leg (called a deep vein thrombosis), such as:  ¨ Pain in your calf, back of the knee, thigh, or groin.  ¨ Redness and swelling in your leg or groin. Watch closely for changes in your health, and be sure to contact your doctor if:  · You feel sad, anxious, or hopeless for more than a few days. · You do not get better as expected. Where can you learn more? Go to http://darrell-saman.info/. Enter M806 in the search box to learn more about \" Section: What to Expect at Home. \"  Current as of: 2017  Content Version: 11.3  © 2239-1677 [employer]. Care instructions adapted under license by [employer] (which disclaims liability or warranty for this information). If you have questions about a medical condition or this instruction, always ask your healthcare professional. [place] [address] any warranty or liability for your use of this information. POSTPARTUM DISCHARGE INSTRUCTIONS       Name:  Milagros Glover  YOB: 1985  Admission Diagnosis:  REPEAT   Previous  section     Discharge Diagnosis:    Problem List as of 2017  Date Reviewed: 10/6/2014          Codes Class Noted - Resolved    Previous  section ICD-10-CM: Z98.891  ICD-9-CM: V45.89  2017 - Present        Twins ICD-10-CM: Z38.5  ICD-9-CM: V27.9  2014 - Present        Other specified complication, antepartum ICD-10-CM: O99.89  ICD-9-CM: 646.83  2014 - Present        H/O  section ICD-10-CM: Z98.891  ICD-9-CM: V45.89  10/18/2013 - Present        Breech presentation ICD-10-CM: O32. 1XX0  ICD-9-CM: 652.20  10/18/2013 - Present        Obese ICD-10-CM: E66.9  ICD-9-CM: 278.00  2013 - Present        Knee pain, left ICD-10-CM: M25.562  ICD-9-CM: 719.46  2013 - Present        RESOLVED: Previous  delivery affecting pregnancy ICD-10-CM: O34.219  ICD-9-CM: 654.20  10/6/2014 - 10/9/2014            Attending Physician:  Peggy Sweet MD    Delivery Type:   Section: What to Expect at [place] [address] Recovery:  A  section, or , is surgery to deliver your baby through a cut, called an incision that the doctor makes in your lower belly and uterus. You may have some pain in your lower belly and need pain medicine for 1 to 2 weeks. You can expect some vaginal bleeding for several weeks. You will probably need about 6 weeks to fully recover. It is important to take it easy while the incision is healing. Avoid heavy lifting, strenuous activities, or exercises that strain the belly muscles while you are recovering. Ask a family member or friend for help with housework, cooking, and shopping. This care sheet gives you a general idea about how long it will take for you to recover. But each person recovers at a different pace. Follow the steps below to get better as quickly as possible. How can you care for yourself at home? Activity  · Rest when you feel tired. Getting enough sleep will help you recover. · Try to walk each day. Start by walking a little more than you did the day before. Bit by bit, increase the amount you walk. Walking boosts blood flow and helps prevent pneumonia, constipation, and blood clots. · Avoid strenuous activities, such as bicycle riding, jogging, weightlifting, and aerobic exercise, for 6 weeks or until your doctor says it is okay. · Until your doctor says it is okay, do not lift anything heavier than your baby. · Do not do sit-ups or other exercise that strain the belly muscles for 6 weeks or until your doctor says it is okay. · Hold a pillow over your incision when you cough or take deep breaths. This will support your belly and decrease your pain. · You may shower as usual. Pat the incision dry when you are done. · You will have some vaginal bleeding. Wear sanitary pads. Do not douche or use tampons until your doctor says it is okay. · Ask your doctor when you can drive again. · You will probably need to take at least 6 weeks off work.  It depends on the type of work you do and how you feel. · Wait until you are healed (about 4 to 6 weeks) before you have sexual intercourse. Your doctor will tell you when it is okay to have sex. · Talk to your doctor about birth control. You can get pregnant even before your period returns. Also, you can get pregnant while you are breast-feeding. Incision care  Your skin is your body's first line of defense against germs, but an incision site leaves an easy way for germs to enter your body. To prevent infection:  · Clean your hands frequently and before and after changing any touching any dressings. · If you have strips of tape on the incision, leave the tape on for a week or until it falls off. · Look at your incision closely every day for any changes. Contact your doctor if you experience any signs of infection, such as fever or increased redness at the surgical site. · Wash the area daily with warm, soapy water, and pat it dry. Don't use hydrogen peroxide or alcohol, which can slow healing. You may cover the area with a gauze bandage if it weeps or rubs against clothing. Change the bandage every day. · Keep the area clean and dry. Diet  · You can eat your normal diet. If your stomach is upset, try bland, low-fat foods like plain rice, broiled chicken, toast, and yogurt. · Drink plenty of fluids (unless your doctor tells you not to). · You may notice that your bowel movements are not regular right after your surgery. This is common. Try to avoid constipation and straining with bowel movements. You may want to take a fiber supplement every day. If you have not had a bowel movement after a couple of days, ask your doctor about taking a mild laxative. · If you are breast-feeding, do not drink any alcohol. Medicines  · Take pain medicines exactly as directed. · If the doctor gave you a prescription medicine for pain, take it as prescribed.   · If you are not taking a prescription pain medicine, ask your doctor if you can take an over-the-counter medicine such as acetaminophen (Tylenol), ibuprofen (Advil, Motrin), or naproxen (Aleve), for cramps. Read and follow all instructions on the label. Do not take aspirin, because it can cause more bleeding. Do not take acetaminophen (Tylenol) and other acetaminophen containing medications (i.e. Percocet) at the same time. · If you think your pain medicine is making you sick to your stomach:  · Take your medicine after meals (unless your doctor has told you not to). · Ask your doctor for a different pain medicine. · If your doctor prescribed antibiotics, take them as directed. Do not stop taking them just because you feel better. You need to take the full course of antibiotics. Mental Health  · Many women get the \"baby blues\" during the first few days after childbirth. You may lose sleep, feel irritable, and cry easily. You may feel happy one minute and sad the next. Hormone changes are one cause of these emotional changes. Also, the demands of a new baby, along with visits from relatives or other family needs, add to a mother's stress. The \"baby blues\" often peak around the fourth day. Then they ease up in less than 2 weeks. · If your moodiness or anxiety lasts for more than 2 weeks, or if you feel like life is not worth living, you may have postpartum depression. This is different for each mother. Some mothers with serious depression may worry intensely about their infant's well-being. Others may feel distant from their child. Some mothers might even feel that they might harm their baby. A mother may have signs of paranoia, wondering if someone is watching her. · With all the changes in your life, you may not know if you are depressed. Pregnancy sometimes causes changes in how you feel that are similar to the symptoms of depression. · Symptoms of depression include:  · Feeling sad or hopeless and losing interest in daily activities.  These are the most common symptoms of depression. · Sleeping too much or not enough. · Feeling tired. You may feel as if you have no energy. · Eating too much or too little. · POSTPARTUM SUPPORT INTERNATIONAL (PSI) offers a Warm line; Chat with the Expert phone sessions; Information and Articles about Pregnancy and Postpartum Mood Disorders; Comprehensive List of Free Support Groups; Knowledgeable local coordinators who will offer support, information, and resources; Guide to Resources on [employer]; Calendar of events in the  mood disorders community; Latest News and Research; and [facility] & [facility] [address] for [employer]. Remember - You are not alone; You are not to blame; With help, you will be well. 5-540-926-PPD(0983). WWW. POSTPARTUM. NET   · Writing or talking about death, such as writing suicide notes or talking about guns, knives, or pills. Keep the numbers for these national suicide hotlines: 2-255-656-TALK (5-797.789.4897) and 1-566-XCPRYMM (9-410.551.3637). If you or someone you know talks about suicide or feeling hopeless, get help right away. Other instructions  · If you breast-feed your baby, you may be more comfortable while you are healing if you place the baby so that he or she is not resting on your belly. Try tucking your baby under your arm, with his or her body along the side you will be feeding on. Support your baby's upper body with your arm. With that hand you can control your baby's head to bring his or her mouth to your breast. This is sometimes called the football hold. Follow-up care is a key part of your treatment and safety. Be sure to make and go to all appointments, and call your doctor if you are having problems. It's also a good idea to know your test results and keep a list of the medicines you take. When should you call for help? Call 911 anytime you think you may need emergency care. For example, call if:  · You are thinking of hurting yourself, your baby, or anyone else.   · You passed out (lost consciousness). · You have symptoms of a blood clot in your lung (called a pulmonary embolism). These may include:  · Sudden chest pain. · Trouble breathing. · Coughing up blood. Call your doctor now or seek immediate medical care if:    · You have severe vaginal bleeding. · You are soaking through a pad each hour for 2 or more hours. · Your vaginal bleeding seems to be getting heavier or is still bright red 4 days after delivery. · You are dizzy or lightheaded, or you feel like you may faint. · You are vomiting or cannot keep fluids down. · You have a fever. · You have new or more belly pain. · You have loose stitches, or your incision comes open. · You have symptoms of infection, such as:  · Increased pain, swelling, warmth, or redness. · Red streaks leading from the incision. · Pus draining from the incision. · A fever  · You pass tissue (not just blood). · Your vaginal discharge smells bad. · Your belly feels tender or full and hard. · Your breasts are continuously painful or red. · You feel sad, anxious, or hopeless for more than a few days. · You have sudden, severe pain in your belly. · You have symptoms of a blood clot in your leg (called a deep vein thrombosis), such as:  · Pain in your calf, back of the knee, thigh, or groin. · Redness and swelling in your leg or groin. · You have symptoms of preeclampsia, such as:  · Sudden swelling of your face, hands, or feet. · New vision problems (such as dimness or blurring). · A severe headache. · Your blood pressure is higher than it should be or rises suddenly. · You have new nausea or vomiting. Watch closely for changes in your health, and be sure to contact your doctor if you have any problems.        Additional Information:  Not applicable    These are general instructions for a healthy lifestyle:    No smoking/ No tobacco products/ Avoid exposure to second hand smoke    Surgeon General's Warning:  Quitting smoking now greatly reduces serious risk to your health. Obesity, smoking, and sedentary lifestyle greatly increases your risk for illness    A healthy diet, regular physical exercise & weight monitoring are important for maintaining a healthy lifestyle    Recognize signs and symptoms of STROKE:    F-face looks uneven    A-arms unable to move or move unevenly    S-speech slurred or non-existent    T-time-call 911 as soon as signs and symptoms begin - DO NOT go       back to bed or wait to see if you get better - TIME IS BRAIN. I have had the opportunity to make my options or choices for discharge. I have received and understand these instructions.

## 2017-07-02 NOTE — ROUTINE PROCESS
8757: Bedside and Verbal shift change report given to ANKIT Yepez, [address]. Myron Holland, student RN (oncoming nurse) by Gregory Causey, RN (offgoing nurse). Report included the following information SBAR, Intake/Output, MAR and Recent Results. 1350: Discharge instructions given and reviewed with pt. All questions answered. Prescriptions given to pt with medication handouts. Pt reminded to schedule 6 week follow up appointment with Dr Fito Tenorio. Pt verbalized understanding. Pt educated to discontinue incision dressing on 7/7/17 per Dr Orly Herrera. Pt verbalized understanding. Pt discharged from unit at this time via wheelchair, holding infant in arms, accompanied by hospital staff. All belongings taken with pt.

## 2017-07-02 NOTE — LACTATION NOTE
This note was copied from a baby's chart.   [address]:   Couplet Interdisciplinary Rounds     MATERNAL    Daily Goal:     Influenza screening completed: NA   Tdap screening completed: YES   Rhogam Given:N/A  MMR Given:N/A    VTE Prophylaxis: Not indicated, per Provider order    EPDS: 7          Patient Name: Junior Mann Diagnosis: [place]  Normal  (single liveborn)   Date of Admission: 2017 LOS: 2  Gestational Age: Gestational Age: 39w0d       Daily Goal:     Birth Weight: 3.615 kg Current Weight: Weight: 3.495 kg (7-11.3)  % of Weight Change: -3%    Feeding: Bottle Formula  [place] Metabolic Screen: YES    Hepatitis B:  YES    Discharge Bili:  YES  Car Seat Trial, if needed:  N/A      Patient/Family Teaching Needs: early D/C today    Days before discharge: Ready for discharge    In Attendance:  Nursing and Physician

## 2017-07-02 NOTE — PROGRESS NOTES
Post-Operative  Day 2    [facility]     Assessment: Post-Op day 2, doing well    Plan:   1. Routine post-operative care  2. Desires DC home today  - fu in 6 weeks  - reviewed taking off dressing 1 week PP keeping area clean/dry, etc      Information for the patient's :  Daylin Bergeron [[id number]]   , Low Transverse   Patient doing well without significant complaint. Nausea and vomiting resolved, tolerating PO, passing flatus, voiding and ambulating without difficulty. Vitals:  Visit Vitals    /58 (BP 1 Location: Right arm, BP Patient Position: At rest)    Pulse 87    Temp 98.2 °F (36.8 °C)    Resp 14    Ht 5' 5\" (1.651 m)    Wt 127.5 kg (281 lb)    SpO2 97%    Breastfeeding Unknown    BMI 46.76 kg/m2     Temp (24hrs), Av.8 °F (36.6 °C), Min:97.4 °F (36.3 °C), Max:98.2 °F (36.8 °C)        Exam:        Patient without distress. Abdomen,  soft, expected tenderness, fundus firm                Wound incision clean, dry and intact- dressing in place               Lower extremities are symmetric without tenderness, cords or erythema.     Labs:   Lab Results   Component Value Date/Time    WBC 13.8 2017 06:20 AM    WBC 8.8 2017 08:49 AM    WBC 13.5 10/07/2014 04:20 AM    WBC 11.9 10/06/2014 06:50 AM    WBC 16.6 10/19/2013 04:15 AM    WBC 22.4 10/18/2013 02:25 PM    WBC 13.0 10/18/2013 06:45 AM    HGB 10.3 2017 06:20 AM    HGB 11.3 2017 08:49 AM    HGB 10.7 10/07/2014 04:20 AM    HGB 12.6 10/06/2014 06:50 AM    HGB 11.8 10/19/2013 04:15 AM    HGB 11.9 10/18/2013 02:25 PM    HGB 12.7 10/18/2013 06:45 AM    HCT 30.6 2017 06:20 AM    HCT 33.6 2017 08:49 AM    HCT 32.4 10/07/2014 04:20 AM    HCT 37.0 10/06/2014 06:50 AM    HCT 35.1 10/19/2013 04:15 AM    HCT 34.5 10/18/2013 02:25 PM    HCT 36.8 10/18/2013 06:45 AM    PLATELET 569  06:20 AM    PLATELET 170  08:49 AM    PLATELET 024  04:20 AM PLATELET 128 32/77/7947 06:50 AM    PLATELET 164 27/57/4300 04:15 AM    PLATELET 290 51/41/4952 02:25 PM    PLATELET 443 34/05/8737 06:45 AM       No results found for this or any previous visit (from the past 24 hour(s)).

## 2017-07-03 NOTE — OP NOTES
Operative Note    Name: Quincy Morfin   Medical Record Number: [id number]   YOB: 1985  Today's Date: July 3, 2017      Pre-operative Diagnosis: REPEAT     Post-operative Diagnosis: same delivered       Operation: low transverse  section Procedure(s):   SECTION    Surgeon(s):  MD Keanu Young MD    Anesthesia: Spinal    Prophylactic Antibiotics: Ancef  DVT Prophylaxis: Sequential Compression Devices         Fetal Description: osei     Birth Information:   Information for the patient's :  Madelaine Kerr [[id number]]   Delivery of a 3.615 kg Male [2] infant on 2017 at 1:34 PM. Apgars were 9 and 9. Umbilical Cord:     Umbilical Cord Events:     Placenta:  removal with  appearance. Amniotic Fluid Volume:        Amniotic Fluid Description:           Umbilical Cord: Nuchal Cord x  1    Placenta:  spontaneous    Estimated Blood Loss (ml):  750mL    Specimens: None           Complications:  none    Procedure Detail:      After proper patient identification and consent, the patient was taken to the operating room, where spinal anesthesia was administered and found to be adequate. Chand catheter had been placed using sterile technique. The patient was prepped and draped in the normal sterile fashion. The abdomen was entered using the Pfannenstiel technique and carried down to the fascia. The fascia was incised sharply and extended laterally. The inferior fascial edge was grasped with two Kocher clamps and sharply dissected off of the rectus muscles. The superior aspect of the fascial edge was then grasped with two Kochers and and the superior rectus was sharply dissected off of the fascial edge. Following this the rectus muscles were  in the midline. The peritoneum was entered bluntly well superior to the bladder without any apparent injury and stretched bilaterally. The bladder flap was created without difficulty.  A low transverse uterine incision was made with the scalpel and extended superiorly and inferiorly with blunt finger dissection. Amniotomy was performed and the fluid was medium amount clear. The feet were grasped bilaterally prior to amniotomy. Following this the . Placenta was then removed from the uterus. The uterus was exteriorized. The uterus was curettaged with a dry lap pad three times and cleared of all clots and debris. The uterine incision was closed with 0 monocryl, double layer  in running locking fashion for the first layer and non-locking for the second layer with good hemostasis assured. Good hemostasis was again reassured and the uterus was returned to the abdomen. The paracolic gutters were cleaned with moist laparotomy pads and good hemostasis was again reassured throughout. The rectus muscles were reapproximated with 2-0 vicryl. The fascia was closed with 0 PDS looped in a running fashion. Good hemostasis was assured. The subcutaneous tissue was closed with 2-0 plain and the skin with 3-0 vicryl subcuticular on a wiley needle. The patient was then transferred to a postpartum room in stable condition.     Dequan Davidson MD  July 3, 2017  12:57 PM

## 2017-07-03 NOTE — PROGRESS NOTES
Delivery Summary  Patient: Selina Villalobos             Circumcision:   desires  Additional Delivery Comments - Scheduled RLTCS    Information for the patient's :  Ronaldo Mesa [[id number]]     Delivery Type: , Low Transverse   Delivery Date: 2017   Delivery Time: 1:34 PM     Birth Weight: 3.615 kg     Sex:  male  Delivery Clinician:      Gestational Age: Gestational Age: 36w0d    Presentation: Breech   Position:             Apgars were 9  and 9      Resuscitation Method: Tactile Stimulation;Suctioning-bulb     Meconium Stained: None  Living Status: Living       Placenta Date/Time:   1:35 PM   Placenta Removal: Manual Removal   Placenta Appearance: Breech [3]     Cord Information: 3 Vessels    Cord Events: None       Cord Blood Sent?:  No    Blood Gases Sent?:  No       Cord pH:  none    Episiotomy: None   Laceration(s): None     Estimated Blood Loss (ml): 750 mL    Labor Events  Method: None      Augmentation: None   Cervical Ripening:                Operative Vaginal Delivery - none

## 2017-10-16 ENCOUNTER — APPOINTMENT (OUTPATIENT)
Dept: GENERAL RADIOLOGY | Age: 32
End: 2017-10-16
Attending: EMERGENCY MEDICINE
Payer: MEDICAID

## 2017-10-16 ENCOUNTER — HOSPITAL ENCOUNTER (EMERGENCY)
Age: 32
Discharge: HOME | End: 2017-10-16
Attending: EMERGENCY MEDICINE
Payer: MEDICAID

## 2017-10-16 VITALS
RESPIRATION RATE: 16 BRPM | SYSTOLIC BLOOD PRESSURE: 133 MMHG | HEIGHT: 65 IN | DIASTOLIC BLOOD PRESSURE: 77 MMHG | OXYGEN SATURATION: 97 % | TEMPERATURE: 98.7 F | WEIGHT: 291.25 LBS | HEART RATE: 93 BPM | BODY MASS INDEX: 48.53 KG/M2

## 2017-10-16 DIAGNOSIS — M54.50 ACUTE MIDLINE LOW BACK PAIN WITHOUT SCIATICA: Primary | ICD-10-CM

## 2017-10-16 PROCEDURE — 99283 EMERGENCY DEPT VISIT LOW MDM: CPT

## 2017-10-16 PROCEDURE — 96372 THER/PROPH/DIAG INJ SC/IM: CPT

## 2017-10-16 PROCEDURE — 74011250636 HC RX REV CODE- 250/636: Performed by: EMERGENCY MEDICINE

## 2017-10-16 PROCEDURE — 72100 X-RAY EXAM L-S SPINE 2/3 VWS: CPT

## 2017-10-16 RX ORDER — METHOCARBAMOL 750 MG/1
750 TABLET, FILM COATED ORAL 3 TIMES DAILY
Qty: 20 TAB | Refills: 0 | Status: SHIPPED | OUTPATIENT
Start: 2017-10-16 | End: 2018-10-29

## 2017-10-16 RX ORDER — TRAMADOL HYDROCHLORIDE 50 MG/1
50 TABLET ORAL
Qty: 20 TAB | Refills: 0 | Status: SHIPPED | OUTPATIENT
Start: 2017-10-16 | End: 2018-10-29

## 2017-10-16 RX ORDER — KETOROLAC TROMETHAMINE 30 MG/ML
60 INJECTION, SOLUTION INTRAMUSCULAR; INTRAVENOUS
Status: COMPLETED | OUTPATIENT
Start: 2017-10-16 | End: 2017-10-16

## 2017-10-16 RX ADMIN — KETOROLAC TROMETHAMINE 60 MG: 30 INJECTION, SOLUTION INTRAMUSCULAR at 13:10

## 2017-10-16 NOTE — ED PROVIDER NOTES
Patient is a 34 y.o. female presenting with back pain. Back Pain    Pertinent negatives include no headaches, no abdominal pain and no dysuria. Pt reports low back pain for 3 weeks which started after moving furniture. Denies any blunt trauma or direct injury. Denies fever, rash,abdominal pain or urinary symptoms. Denies any saddle anesthesia or changes in bowel or bladder habits. Pain increases with bending, lifting and position changes. Gait is  steady; reflexes are normal.Pt reports taking motrin and tylenol without relief. Old charts reviewed    Past Medical History:   Diagnosis Date    Abnormal Pap smear     normal follow up    Abnormal Papanicolaou smear of cervix     Leep procedure in     Anemia     \"borderline\" no supplement    Chlamydia     dx in , treated    Knee pain, left 2013    Obesity     Postpartum depression     after last baby, who is 7 mo old    [facility] trait syndrome ([facility] [address]     Sickle-cell disease, unspecified     trait       Past Surgical History:   Procedure Laterality Date     DELIVERY ONLY      HX  SECTION  2009    HX DILATION AND CURETTAGE      HX LEEP PROCEDURE           Family History:   Problem Relation Age of Onset    Diabetes Mother     Hypertension Mother     Heart Attack Mother     Diabetes Father     Heart Attack Brother     Asthma Brother     Cancer Other      cousin  of breast ca       Social History     Social History    Marital status: SINGLE     Spouse name: N/A    Number of children: N/A    Years of education: N/A     Occupational History    Not on file.      Social History Main Topics    Smoking status: Never Smoker    Smokeless tobacco: Never Used    Alcohol use No      Comment: socially    Drug use: No    Sexual activity: Yes     Partners: Male     Birth control/ protection: None     Other Topics Concern    Not on file     Social History Narrative         ALLERGIES: Review of patient's allergies indicates no known allergies. Review of Systems   Constitutional: Negative for activity change and appetite change. HENT: Negative for facial swelling, sore throat and trouble swallowing. Eyes: Negative. Respiratory: Negative for shortness of breath. Cardiovascular: Negative. Gastrointestinal: Negative for abdominal pain, diarrhea and vomiting. Genitourinary: Negative for dysuria. Musculoskeletal: Positive for back pain. Negative for neck pain. Skin: Negative for color change. Neurological: Negative for headaches. Psychiatric/Behavioral: Negative. Vitals:    10/16/17 1143   BP: 133/77   Pulse: 93   Resp: 16   Temp: 98.7 °F (37.1 °C)   SpO2: 97%   Weight: 132.1 kg (291 lb 4 oz)   Height: 5' 5\" (1.651 m)            Physical Exam   Constitutional: She is oriented to person, place, and time. Obese black female; non smoker   HENT:   Head: Normocephalic. Mouth/Throat: Oropharynx is clear and moist.   Eyes: Pupils are equal, round, and reactive to light. Neck: Normal range of motion. Neck supple. Cardiovascular: Normal rate and regular rhythm. Pulmonary/Chest: Effort normal and breath sounds normal.   Abdominal: Soft. Bowel sounds are normal.   Musculoskeletal: Normal range of motion. Denies any blunt trauma or direct injury. Denies fever,rash, abdominal pain or urinary symptoms. Pain increases with bending, lifting and position changes. Gait is  steady; reflexes are normal.     Lymphadenopathy:     She has no cervical adenopathy. Neurological: She is alert and oriented to person, place, and time. Skin: Skin is warm and dry. No rash noted. Nursing note and vitals reviewed. MDM  ED Course       Procedures    Pt has been re-examined and reports some relief from Toradol injection given. Plan to try muscle relaxants and short course of pain medications; close ortho follow up. Patient's results and plan of care have been reviewed with her.   Patient has verbally conveyed her understanding and agreement of her signs, symptoms, diagnosis, treatment and prognosis and additionally agrees to follow up as recommended or return to the Emergency Room should her condition change prior to follow-up. Discharge instructions have also been provided to the patient with some educational information regarding her diagnosis as well a list of reasons why she would want to return to the ER prior to her follow-up appointment should her condition change. Loretta Lubin NP

## 2017-10-16 NOTE — ED TRIAGE NOTES
Lower back pain x 2 weeks, pt stated she was moving some furniture 2 weeks ago, denies radiating pain

## 2017-10-16 NOTE — DISCHARGE INSTRUCTIONS
Back Pain, Emergency or Urgent Symptoms: Care Instructions  Your Care Instructions  Many people have back pain at one time or another. In most cases, pain gets better with self-care that includes over-the-counter pain medicine, ice, heat, and exercises. Unless you have symptoms of a severe injury or heart attack, you may be able to give yourself a few days before you call a doctor. But some back problems are very serious. Do not ignore symptoms that need to be checked right away. Follow-up care is a key part of your treatment and safety. Be sure to make and go to all appointments, and call your doctor if you are having problems. It's also a good idea to know your test results and keep a list of the medicines you take. How can you care for yourself at home? · Sit or lie in positions that are most comfortable and that reduce your pain. Try one of these positions when you lie down:  ¨ Lie on your back with your knees bent and supported by large pillows. ¨ Lie on the floor with your legs on the seat of a sofa or chair. Neftaly Loach on your side with your knees and hips bent and a pillow between your legs. ¨ Lie on your stomach if it does not make pain worse. · Do not sit up in bed, and avoid soft couches and twisted positions. Bed rest can help relieve pain at first, but it delays healing. Avoid bed rest after the first day. · Change positions every 30 minutes. If you must sit for long periods of time, take breaks from sitting. Get up and walk around, or lie flat. · Try using a heating pad on a low or medium setting, for 15 to 20 minutes every 2 or 3 hours. Try a warm shower in place of one session with the heating pad. You can also buy single-use heat wraps that last up to 8 hours. You can also try ice or cold packs on your back for 10 to 20 minutes at a time, several times a day. (Put a thin cloth between the ice pack and your skin.) This reduces pain and makes it easier to be active and exercise.   · Take pain medicines exactly as directed. ¨ If the doctor gave you a prescription medicine for pain, take it as prescribed. ¨ If you are not taking a prescription pain medicine, ask your doctor if you can take an over-the-counter medicine. When should you call for help? Call 911 anytime you think you may need emergency care. For example, call if:  · You are unable to move a leg at all. · You have back pain with severe belly pain. · You have symptoms of a heart attack. These may include:  ¨ Chest pain or pressure, or a strange feeling in the chest.  ¨ Sweating. ¨ Shortness of breath. ¨ Nausea or vomiting. ¨ Pain, pressure, or a strange feeling in the back, neck, jaw, or upper belly or in one or both shoulders or arms. ¨ Lightheadedness or sudden weakness. ¨ A fast or irregular heartbeat. After you call 911, the  may tell you to chew 1 adult-strength or 2 to 4 low-dose aspirin. Wait for an ambulance. Do not try to drive yourself. Call your doctor now or seek immediate medical care if:  · You have new or worse symptoms in your arms, legs, chest, belly, or buttocks. Symptoms may include:  ¨ Numbness or tingling. ¨ Weakness. ¨ Pain. · You lose bladder or bowel control. · You have back pain and:  ¨ You have injured your back while lifting or doing some other activity. Call if the pain is severe, has not gone away after 1 or 2 days, and you cannot do your normal daily activities. ¨ You have had a back injury before that needed treatment. ¨ Your pain has lasted longer than 4 weeks. ¨ You have had weight loss you cannot explain. ¨ You are age 48 or older. ¨ You have cancer now or have had it before. Watch closely for changes in your health, and be sure to contact your doctor if you are not getting better as expected. Where can you learn more? Go to http://darrell-saman.info/.   Enter G254 in the search box to learn more about \"Back Pain, Emergency or Urgent Symptoms: Care Instructions. \"  Current as of: March 20, 2017  Content Version: 11.3  © 3398-7218 [employer], [facility]. Care instructions adapted under license by [employer] (which disclaims liability or warranty for this information). If you have questions about a medical condition or this instruction, always ask your healthcare professional. [place] [address] any warranty or liability for your use of this information.

## 2018-10-29 ENCOUNTER — OFFICE VISIT (OUTPATIENT)
Dept: URGENT CARE | Age: 33
End: 2018-10-29

## 2018-10-29 VITALS
HEART RATE: 72 BPM | TEMPERATURE: 97.2 F | SYSTOLIC BLOOD PRESSURE: 121 MMHG | OXYGEN SATURATION: 98 % | HEIGHT: 65 IN | DIASTOLIC BLOOD PRESSURE: 66 MMHG | RESPIRATION RATE: 16 BRPM | WEIGHT: 291 LBS | BODY MASS INDEX: 48.48 KG/M2

## 2018-10-29 DIAGNOSIS — L29.8 PRURITIC ERYTHEMATOUS RASH: Primary | ICD-10-CM

## 2018-10-29 PROBLEM — E66.01 OBESITY, MORBID (HCC): Status: ACTIVE | Noted: 2018-10-29

## 2018-10-29 RX ORDER — HYDROXYZINE PAMOATE 25 MG/1
25 CAPSULE ORAL
Qty: 20 CAP | Refills: 0 | Status: SHIPPED | OUTPATIENT
Start: 2018-10-29 | End: 2019-01-07 | Stop reason: ALTCHOICE

## 2018-10-29 RX ORDER — PREDNISONE 10 MG/1
TABLET ORAL
Qty: 21 TAB | Refills: 0 | Status: SHIPPED | OUTPATIENT
Start: 2018-10-29 | End: 2019-01-07 | Stop reason: ALTCHOICE

## 2018-10-29 NOTE — PROGRESS NOTES
Rash    The history is provided by the patient. This is a new problem. The current episode started 2 days ago. The problem has been gradually worsening. The problem is associated with an unknown factor. There has been no fever. Affected Location: diffuse- on torso and extremities. Associated symptoms include itching and hives. She has tried oral antihistamines (permethrin ) for the symptoms. The treatment provided no relief.         Past Medical History:   Diagnosis Date    Abnormal Pap smear     normal follow up    Abnormal Papanicolaou smear of cervix     Leep procedure in     Anemia     \"borderline\" no supplement    Chlamydia     dx in , treated    Knee pain, left 2013    Obesity     Postpartum depression     after last baby, who is 7 mo old    Sickle [facility] trait syndrome ([facility] [address]     Sickle-cell disease, unspecified     trait        Past Surgical History:   Procedure Laterality Date     DELIVERY ONLY      HX  SECTION  2009    HX DILATION AND CURETTAGE      HX LEEP PROCEDURE  2007         Family History   Problem Relation Age of Onset    Diabetes Mother     Hypertension Mother     Heart Attack Mother     Diabetes Father     Heart Attack Brother     Asthma Brother     Cancer Other         cousin  of breast ca        Social History     Socioeconomic History    Marital status: SINGLE     Spouse name: Not on file    Number of children: Not on file    Years of education: Not on file    Highest education level: Not on file   Social Needs    Financial resource strain: Not on file    Food insecurity - worry: Not on file    Food insecurity - inability: Not on file   [employer] needs - medical: Not on file   [employer] needs - non-medical: Not on file   Occupational History    Not on file   Tobacco Use    Smoking status: Never Smoker    Smokeless tobacco: Never Used   Substance and Sexual Activity    Alcohol use: No     Comment: [address] Drug use: No    Sexual activity: Yes     Partners: Male     Birth control/protection: None   Other Topics Concern    Not on file   Social History Narrative    Not on file                ALLERGIES: Patient has no known allergies. Review of Systems   Constitutional: Negative for chills and fever. HENT: Negative for sore throat. Skin: Positive for itching and rash. All other systems reviewed and are negative. Vitals:    10/29/18 1150   BP: 121/66   Pulse: 72   Resp: 16   Temp: 97.2 °F (36.2 °C)   SpO2: 98%   Weight: 291 lb (132 kg)   Height: 5' 5\" (1.651 m)       Physical Exam   Skin: Rash (fine erythematous rash- diffuse- most prominant on abdomen ) noted. Rash is maculopapular and urticarial.   Vitals reviewed. MDM    Procedures        ICD-10-CM ICD-9-CM    1. Pruritic erythematous rash L29.8 698.8      allergic reaction       Medications Ordered Today   Medications    hydrOXYzine pamoate (VISTARIL) 25 mg capsule     Sig: Take 1 Cap by mouth three (3) times daily as needed for Itching. Dispense:  20 Cap     Refill:  0    predniSONE (STERAPRED DS) 10 mg dose pack     Sig: As directed     Dispense:  21 Tab     Refill:  0     No results found for any visits on 10/29/18. The patients condition was discussed with the patient and they understand. The patient is to follow up with primary care doctor. If signs and symptoms become worse the pt is to go to the ER. The patient is to take medications as prescribed.

## 2018-10-29 NOTE — PATIENT INSTRUCTIONS
Rash: Care Instructions  Your Care Instructions  A rash is any irritation or inflammation of the skin. Rashes have many possible causes, including allergy, infection, illness, heat, and emotional stress. Follow-up care is a key part of your treatment and safety. Be sure to make and go to all appointments, and call your doctor if you are having problems. It's also a good idea to know your test results and keep a list of the medicines you take. How can you care for yourself at home? · Wash the area with water only. Soap can make dryness and itching worse. Pat dry. · Put cold, wet cloths on the rash to reduce itching. · Keep cool, and stay out of the sun. · Leave the rash open to the air as much of the time as possible. · Sometimes petroleum jelly (Vaseline) can help relieve the discomfort caused by a rash. A moisturizing lotion, such as Cetaphil, also may help. Calamine lotion may help for rashes caused by contact with something (such as a plant or soap) that irritated the skin. Use it 3 or 4 times a day. · If your doctor prescribed a cream, use it as directed. If your doctor prescribed medicine, take it exactly as directed. · If your rash itches so badly that it interferes with your normal activities, take an over-the-counter antihistamine, such as diphenhydramine (Benadryl) or loratadine (Claritin). Read and follow all instructions on the label. When should you call for help? Call your doctor now or seek immediate medical care if:    · You have signs of infection, such as:  ? Increased pain, swelling, warmth, or redness. ? Red streaks leading from the area. ? Pus draining from the area. ? A fever.     · You have joint pain along with the rash.    Watch closely for changes in your health, and be sure to contact your doctor if:    · Your rash is changing or getting worse.  For example, call if you have pain along with the rash, the rash is spreading, or you have new blisters.     · You do not get better after 1 week. Where can you learn more? Go to http://darrell-saman.info/. Enter W909 in the search box to learn more about \"Rash: Care Instructions. \"  Current as of: April 18, 2018  Content Version: 11.8  © 3186-8860 [employer]. Care instructions adapted under license by [employer] (which disclaims liability or warranty for this information). If you have questions about a medical condition or this instruction, always ask your healthcare professional. [address] any warranty or liability for your use of this information.

## 2018-12-26 ENCOUNTER — HOSPITAL ENCOUNTER (EMERGENCY)
Age: 33
Discharge: HOME | End: 2018-12-26
Attending: EMERGENCY MEDICINE
Payer: MEDICAID

## 2018-12-26 VITALS
DIASTOLIC BLOOD PRESSURE: 66 MMHG | OXYGEN SATURATION: 94 % | TEMPERATURE: 97.5 F | HEART RATE: 91 BPM | RESPIRATION RATE: 16 BRPM | SYSTOLIC BLOOD PRESSURE: 113 MMHG

## 2018-12-26 DIAGNOSIS — H66.92 LEFT OTITIS MEDIA, UNSPECIFIED OTITIS MEDIA TYPE: Primary | ICD-10-CM

## 2018-12-26 PROCEDURE — 77030015919

## 2018-12-26 PROCEDURE — 99283 EMERGENCY DEPT VISIT LOW MDM: CPT

## 2018-12-26 PROCEDURE — 74011250637 HC RX REV CODE- 250/637: Performed by: PHYSICIAN ASSISTANT

## 2018-12-26 PROCEDURE — 75810000150 HC RMVL IMPACTED CERUMEN 1 / 2

## 2018-12-26 RX ORDER — ONDANSETRON 4 MG/1
4 TABLET, ORALLY DISINTEGRATING ORAL
Qty: 10 TAB | Refills: 0 | Status: SHIPPED | OUTPATIENT
Start: 2018-12-26 | End: 2019-01-07 | Stop reason: ALTCHOICE

## 2018-12-26 RX ORDER — ONDANSETRON 4 MG/1
4 TABLET, ORALLY DISINTEGRATING ORAL
Status: COMPLETED | OUTPATIENT
Start: 2018-12-26 | End: 2018-12-26

## 2018-12-26 RX ORDER — AMOXICILLIN 875 MG/1
875 TABLET, FILM COATED ORAL 2 TIMES DAILY
Qty: 14 TAB | Refills: 0 | Status: SHIPPED | OUTPATIENT
Start: 2018-12-26 | End: 2019-01-02

## 2018-12-26 RX ADMIN — ONDANSETRON 4 MG: 4 TABLET, ORALLY DISINTEGRATING ORAL at 12:15

## 2018-12-26 NOTE — ED PROVIDER NOTES
28year old female hx anemia, sickle cell trait presenting to the ED for \"cold, cough, a little vomiting, my ears ache, my body aches. \"  Symptoms started last night.  + sore throat. Vomited x 1 this morning.  + diarrhea just prior. No fever  + chills. + sputum production. Daughter sick with similar symptoms. Pt took Prabha seltzer cold with no significant relief. PMHx: obesity, anemia, sickle cell trait  Social: non-smoker. Rare alchool.  + sick contacts.                Past Medical History:   Diagnosis Date    Abnormal Pap smear     normal follow up    Abnormal Papanicolaou smear of cervix     Leep procedure in     Anemia     \"borderline\" no supplement    Chlamydia     dx in , treated    Knee pain, left 2013    Obesity     Postpartum depression     after last baby, who is 7 mo old    S[facility]le [facility] trait syndrome ([facility] [address]     Sickle-cell disease, unspecified     trait       Past Surgical History:   Procedure Laterality Date     DELIVERY ONLY      HX  SECTION  2009    HX DILATION AND CURETTAGE      HX LEEP PROCEDURE           Family History:   Problem Relation Age of Onset    Diabetes Mother     Hypertension Mother     Heart Attack Mother     Diabetes Father     Heart Attack Brother     Asthma Brother     Cancer Other         cousin  of breast ca       Social History     Socioeconomic History    Marital status: SINGLE     Spouse name: Not on file    Number of children: Not on file    Years of education: Not on file    Highest education level: Not on file   Social Needs    Financial resource strain: Not on file    Food insecurity - worry: Not on file    Food insecurity - inability: Not on file   [employer] needs - medical: Not on file   [employer] needs - non-medical: Not on file   Occupational History    Not on file   Tobacco Use    Smoking status: Never Smoker    Smokeless tobacco: Never Used   Substance and Sexual Activity  Alcohol use: No     Comment: socially    Drug use: No    Sexual activity: Yes     Partners: Male     Birth control/protection: None   Other Topics Concern    Not on file   Social History Narrative    Not on file         ALLERGIES: Patient has no known allergies. Review of Systems   Constitutional: Positive for chills. Negative for fever. HENT: Positive for congestion, ear pain and sore throat. Eyes: Negative for discharge and redness. Respiratory: Positive for cough. Negative for shortness of breath. Cardiovascular: Negative for chest pain. Gastrointestinal: Positive for diarrhea and vomiting. Genitourinary: Negative for difficulty urinating. Musculoskeletal: Positive for myalgias. Negative for joint swelling. Skin: Negative for wound. Neurological: Negative for syncope. All other systems reviewed and are negative. Vitals:    12/26/18 1122 12/26/18 1200   BP:  110/75   Pulse: (!) 103 87   Resp:  16   Temp:  97.4 °F (36.3 °C)   SpO2: 98% 98%            Physical Exam   Constitutional: She appears well-developed and well-nourished. No distress. Pleasant AA female appears to not feel well   HENT:   Right Ear: External ear normal.   Left Ear: External ear normal.   + nasal congestion  Right TM: normal  Left TM: cerumen oclusion   Eyes: Pupils are equal, round, and reactive to light. Neck: Normal range of motion. Neck supple. Cardiovascular: Normal rate, regular rhythm and normal heart sounds. Exam reveals no gallop and no friction rub. No murmur heard. Pulmonary/Chest: Effort normal and breath sounds normal. No respiratory distress. She has no wheezes. Lungs clear   Abdominal: Soft. There is no tenderness. There is no guarding. Musculoskeletal: Normal range of motion. Lymphadenopathy:     She has no cervical adenopathy. Neurological: She is alert. Skin: Skin is warm and dry. Psychiatric: She has a normal mood and affect. Nursing note and vitals reviewed. MDM  Number of Diagnoses or Management Options  Left otitis media, unspecified otitis media type:   Diagnosis management comments: 28year old female presenting to the ED for flu like symptoms without fever, ear pain.  + LEFT ear cerumen impaction, after removal, TM dull, bulging, erythematous. Will treat as AOM. Encouraged supportive care. Amount and/or Complexity of Data Reviewed  Discuss the patient with other providers: yes (Dr. Jacobo Stephenson ED attending)           Ear irrigation  Date/Time: 12/26/2018 12:52 PM  Performed by: MARIANNE Downing  Authorized by: MARIANNE Downing     Comments:      LEFT ear irrigated: large ball of cerumen and cotton removed from the ear with irrigation and curette.   TM visualized, red, bulging, dull

## 2018-12-26 NOTE — ED TRIAGE NOTES
C/o \"cold, cough, vomiting, ears full, body aches, sore throat\". Pt reports vomiting x 1 and diarrhea x 1. Denies fever. +chills.

## 2018-12-26 NOTE — DISCHARGE INSTRUCTIONS
- return for new or worsening symptoms  - lots of fluids and rest  - primary care follow up in 2-3 days  - ibuprofen for pain or muscle aches     Ear Infection (Otitis Media): Care Instructions  Your Care Instructions    An ear infection may start with a cold and affect the middle ear (otitis media). It can hurt a lot. Most ear infections clear up on their own in a couple of days. Most often you will not need antibiotics. This is because many ear infections are caused by a virus. Antibiotics don't work against a virus. Regular doses of pain medicines are the best way to reduce your fever and help you feel better. Follow-up care is a key part of your treatment and safety. Be sure to make and go to all appointments, and call your doctor if you are having problems. It's also a good idea to know your test results and keep a list of the medicines you take. How can you care for yourself at home? · Take pain medicines exactly as directed. ? If the doctor gave you a prescription medicine for pain, take it as prescribed. ? If you are not taking a prescription pain medicine, take an over-the-counter medicine, such as acetaminophen (Tylenol), ibuprofen (Advil, Motrin), or naproxen (Aleve). Read and follow all instructions on the label. ? Do not take two or more pain medicines at the same time unless the doctor told you to. Many pain medicines have acetaminophen, which is Tylenol. Too much acetaminophen (Tylenol) can be harmful. · Plan to take a full dose of pain reliever before bedtime. Getting enough sleep will help you get better. · Try a warm, moist washcloth on the ear. It may help relieve pain. · If your doctor prescribed antibiotics, take them as directed. Do not stop taking them just because you feel better. You need to take the full course of antibiotics. When should you call for help?   Call your doctor now or seek immediate medical care if:    · You have new or increasing ear pain.     · You have new or increasing pus or blood draining from your ear.     · You have a fever with a stiff neck or a severe headache.    Watch closely for changes in your health, and be sure to contact your doctor if:    · You have new or worse symptoms.     · You are not getting better after taking an antibiotic for 2 days. Where can you learn more? Go to http://darrell-saman.info/. Enter R083 in the search box to learn more about \"Ear Infection (Otitis Media): Care Instructions. \"  Current as of: March 28, 2018  Content Version: 11.8  © 7540-7677 [employer]. Care instructions adapted under license by [employer] (which disclaims liability or warranty for this information). If you have questions about a medical condition or this instruction, always ask your healthcare professional. [address] any warranty or liability for your use of this information.

## 2018-12-26 NOTE — ED NOTES
Pt ambulatory out of ED with discharge instructions and prescriptions in hand given by MARY LOPES; pt verbalized understanding of discharge paperwork and time allotted for questions. VSS. Pt alert and oriented.

## 2019-01-07 ENCOUNTER — OFFICE VISIT (OUTPATIENT)
Dept: FAMILY MEDICINE CLINIC | Age: 34
End: 2019-01-07

## 2019-01-07 VITALS
WEIGHT: 291.5 LBS | RESPIRATION RATE: 18 BRPM | HEART RATE: 94 BPM | DIASTOLIC BLOOD PRESSURE: 68 MMHG | HEIGHT: 65 IN | BODY MASS INDEX: 48.57 KG/M2 | TEMPERATURE: 98 F | OXYGEN SATURATION: 98 % | SYSTOLIC BLOOD PRESSURE: 112 MMHG

## 2019-01-07 DIAGNOSIS — Z13.21 ENCOUNTER FOR VITAMIN DEFICIENCY SCREENING: ICD-10-CM

## 2019-01-07 DIAGNOSIS — H69.83 DYSFUNCTION OF BOTH EUSTACHIAN TUBES: Primary | ICD-10-CM

## 2019-01-07 DIAGNOSIS — Z13.1 DIABETES MELLITUS SCREENING: ICD-10-CM

## 2019-01-07 DIAGNOSIS — Z00.00 LABORATORY EXAMINATION ORDERED AS PART OF A ROUTINE GENERAL MEDICAL EXAMINATION: ICD-10-CM

## 2019-01-07 DIAGNOSIS — Z23 ENCOUNTER FOR IMMUNIZATION: ICD-10-CM

## 2019-01-07 DIAGNOSIS — J30.9 ALLERGIC RHINITIS, UNSPECIFIED SEASONALITY, UNSPECIFIED TRIGGER: ICD-10-CM

## 2019-01-07 DIAGNOSIS — Z83.3 FAMILY HISTORY OF DIABETES MELLITUS: ICD-10-CM

## 2019-01-07 DIAGNOSIS — Z86.2 HISTORY OF ANEMIA: ICD-10-CM

## 2019-01-07 DIAGNOSIS — Z13.220 LIPID SCREENING: ICD-10-CM

## 2019-01-07 DIAGNOSIS — Z82.49 FAMILY HISTORY OF HEART DISEASE: ICD-10-CM

## 2019-01-07 DIAGNOSIS — E66.01 MORBID OBESITY WITH BMI OF 45.0-49.9, ADULT (HCC): ICD-10-CM

## 2019-01-07 DIAGNOSIS — Z00.00 ENCOUNTER FOR MEDICAL EXAMINATION TO ESTABLISH CARE: ICD-10-CM

## 2019-01-07 NOTE — PROGRESS NOTES
Dayanara Hutson  Identified pt with two pt identifiers(name and ). Chief Complaint   Patient presents with   [facility].     rm 10/non fasting/right ear pain, sharp pain since 2018/       . Have you been to the ER, urgent care clinic since your last visit? Yes 2018 r/t ear pain  Hospitalized since your last visit? no    2. Have you seen or consulted any other health care providers outside of the [address] [place] [address] since your last visit? Include any pap smears or colon screening. no      Advance Care Planning    In the event something were to happen to you and you were unable to speak on your behalf, do you have an Advance Directive/ Living Will in place stating your wishes? NO    If yes, do we have a copy on file NO    If no, would you like information NO    Medication reconciliation up to date and corrected with patient at this time. Today's provider has been notified of reason for visit, vitals and flowsheets obtained on patients. Reviewed record in preparation for visit, huddled with provider and have obtained necessary documentation. Health Maintenance Due   Topic    PAP AKA CERVICAL CYTOLOGY     Influenza Age 5 to Adult        Wt Readings from Last 3 Encounters:   19 291 lb 8 oz (132.2 kg)   10/29/18 291 lb (132 kg)   10/16/17 291 lb 4 oz (132.1 kg)     Temp Readings from Last 3 Encounters:   19 98 °F (36.7 °C) (Oral)   18 97.5 °F (36.4 °C)   10/29/18 97.2 °F (36.2 °C)     BP Readings from Last 3 Encounters:   19 (!) 132/91   18 113/66   10/29/18 121/66     Pulse Readings from Last 3 Encounters:   19 94   18 91   10/29/18 72     Vitals:    19 1336   BP: (!) 132/91   Pulse: 94   Resp: 18   Temp: 98 °F (36.7 °C)   TempSrc: Oral   SpO2: 98%   Weight: 291 lb 8 oz (132.2 kg)   Height: 5' 5\" (1.651 m)   PainSc:   0 - No pain   LMP: 2018         Learning Assessment:  :     No flowsheet data found.     Depression Screening:  : No flowsheet data found. No flowsheet data found. Fall Risk Assessment:  :     No flowsheet data found. Abuse Screening:  :     No flowsheet data found. ADL Screening:  :     No flowsheet data found. BMI:  Weight Metrics 1/7/2019 10/29/2018 10/16/2017 6/30/2017 6/29/2017 6/13/2017 1/25/2015   Weight 291 lb 8 oz 291 lb 291 lb 4 oz 281 lb 281 lb 280 lb 248 lb 0.3 oz   BMI 48.51 kg/m2 48.42 kg/m2 48.47 kg/m2 46.76 kg/m2 46.76 kg/m2 46.59 kg/m2 41.27 kg/m2           Medication reconciliation up to date and corrected with patient at this time.

## 2019-01-07 NOTE — PROGRESS NOTES
HPI:  28 y.o.  presents as new patient to establish care. She does not have a prior PCP. She lives in [facility], born [place] raised. She has been having care through her Ob/Gyn. She has 5 children, ages 5, 11, 3 yo twins, and 1. She was seen at [facility] ER on 2018 with left ear cerumen impaction cleared to reveal left otitis media. She was treated with amoxicillin 875 mg bid x 7d. She did not take the last 3 pills of amoxicillin. Her ear pain improved. She was taking Mucinex for a cough. She still has a mild cough, but it is improving. She complains of right ear ache last night, that is better today. She notes a lot of sneezing if around dust, but she has never taken OTC allergy meds or nasal sprays. She has been sneezing a lot lately. She has removed relatives (cousins) with breast cancer, but no first degree relatives. She has h/o abnormal pap and LEEP procedure. She sees her Ob/Gyn for breast exam and pap smears. Her brother has had success with weight loss using phentermine. She is interested in same.     Patient Active Problem List    Diagnosis    Obesity, morbid ([address]    Previous  section    Twins    Other specified complication, [id number](855.83)    H/O  section    Breech presentation    Obese    Knee pain, left         Past Medical History:   Diagnosis Date    Abnormal Pap smear     normal follow up    Abnormal Papanicolaou smear of cervix     Leep procedure in     Anemia     \"borderline\" no supplement    Chlamydia     dx in , treated    Knee pain, left 2013    Obesity     Postpartum depression     after last baby, who is 7 mo old    Sickle cell trait syndrome ([address]     Sickle-cell disease, unspecified     trait       Social History     Tobacco Use    Smoking status: Never Smoker    Smokeless tobacco: Never Used   Substance Use Topics    Alcohol use: No     Comment: socially    Drug use: No           No Known Allergies    ROS:  ROS negative except as per HPI. PE:  Visit Vitals  BP (!) 132/91 (BP 1 Location: Left arm, BP Patient Position: Sitting)   Pulse 94   Temp 98 °F (36.7 °C) (Oral)   Resp 18   Ht 5' 5\" (1.651 m)   Wt 291 lb 8 oz (132.2 kg)   LMP 12/12/2018   SpO2 98%   BMI 48.51 kg/m²     BP recheck 112/68    Gen: alert, oriented, no acute distress  Head: normocephalic, atraumatic  Ears: external auditory canals clear, TMs without erythema or effusion  Eyes: pupils equal round reactive to light, sclera clear, conjunctiva clear  Oral: moist mucus membranes, no oral lesions, no pharyngeal inflammation or exudate  Neck: symmetric normal sized thyroid, no carotid bruits, no jugular vein distention  Resp: no increase work of breathing, lungs clear to ausculation bilaterally, no wheezing, rales or rhonchi  CV: S1, S2 normal.  No murmurs, rubs, or gallops. Abd: soft, obese, not tender, not distended. No hepatosplenomegaly. Normal bowel sounds. Neuro: cranial nerves intact, normal strength and movement in all extremities  Skin: no lesion or rash  Extremities: no cyanosis or edema    No results found for this visit on 01/07/19. Assessment/Plan:      ICD-10-CM ICD-9-CM    1. Dysfunction of both eustachian tubes H69.83 381.81    2. Allergic rhinitis, unspecified seasonality, unspecified trigger J30.9 477.9    3. Encounter for immunization Z23 V03.89 INFLUENZA VIRUS VAC QUAD,SPLIT,PRESV FREE SYRINGE IM      PA IMMUNIZ ADMIN,1 SINGLE/COMB VAC/TOXOID   4. Encounter for medical examination to establish care Z00.00 V70.9    5. Family history of diabetes mellitus Z83.3 V18.0 HEMOGLOBIN A1C WITH EAG   6. Diabetes mellitus screening Z13.1 V77.1 HEMOGLOBIN A1C WITH EAG   7. Lipid screening Z13.220 V77.91 LIPID PANEL   8. Family history of heart disease Z82.49 V17.49 LIPID PANEL   9. Encounter for vitamin deficiency screening Z13.21 V77.99 VITAMIN D, 25 HYDROXY   10. History of anemia Z86.2 V12.3 CBC WITH AUTOMATED DIFF   11.  Laboratory examination ordered as part of a routine general medical examination Z00.00 V72.62 CBC WITH AUTOMATED DIFF      METABOLIC PANEL, COMPREHENSIVE   12. Morbid obesity with BMI of 45.0-49.9, adult (HCC) E66.01 278.01 TSH 3RD GENERATION    Z68.42 V85.42        Use Flonase (fluticasone) or Nasacort AQ (triamcinolone) 2 sprays each nostril daily for the nasal congestion and this will help the ears too. Use for 2-4 weeks straight. Then you may try lowering down to one spray daily as maintenance (and increase back up to 2 sprays if your symptoms increase). She will follow up with OB/Gyn for routine pap, pelvic, and breast exam.    Health Maintenance reviewed - updated. Recommended healthy diet low in carbohydrates, fats, sodium and cholesterol. Recommended regular cardiovascular exercise 3-6 times per week for 30-60 minutes daily. Verbal and written instructions (see AVS) provided. Patient expresses understanding of diagnosis and treatment plan. Follow-up Disposition:  Return in about 1 month (around 2/7/2019) for with Alex Rosado NP for weight management.

## 2019-01-07 NOTE — PATIENT INSTRUCTIONS
Vaccine Information Statement    Influenza (Flu) Vaccine (Inactivated or Recombinant): What you need to know    Many Vaccine Information Statements are available in Georgian and other languages. See www.immunize.org/vis  Hojas de Información Sobre Vacunas están disponibles en Español y en muchos otros idiomas. Visite www.immunize.org/vis    1. Why get vaccinated? Influenza (flu) is a contagious disease that spreads around the [place] every year, usually between October and May. Flu is caused by influenza viruses, and is spread mainly by coughing, sneezing, and close contact. Anyone can get flu. Flu strikes suddenly and can last several days. Symptoms vary by age, but can include:   fever/chills   sore throat   muscle aches   fatigue   cough   headache    runny or stuffy nose    Flu can also lead to pneumonia and blood infections, and cause diarrhea and seizures in children. If you have a medical condition, such as heart or lung disease, flu can make it worse. Flu is more dangerous for some people. Infants and young children, people 72years of age and older, pregnant women, and people with certain health conditions or a weakened immune system are at greatest risk. Each year thousands of people in the [facility] die from flu, and many more are hospitalized. Flu vaccine can:   keep you from getting flu,   make flu less severe if you do get it, and   keep you from spreading flu to your family and other people. 2. Inactivated and recombinant flu vaccines    A dose of flu vaccine is recommended every flu season. Children 6 months through 6years of age may need two doses during the same flu season. Everyone else needs only one dose each flu season.        Some inactivated flu vaccines contain a very small amount of a mercury-based preservative called thimerosal. Studies have not shown thimerosal in vaccines to be harmful, but flu vaccines that do not contain thimerosal are available. There is no live flu virus in flu shots. They cannot cause the flu. There are many flu viruses, and they are always changing. Each year a new flu vaccine is made to protect against three or four viruses that are likely to cause disease in the upcoming flu season. But even when the vaccine doesnt exactly match these viruses, it may still provide some protection    Flu vaccine cannot prevent:   flu that is caused by a virus not covered by the vaccine, or   illnesses that look like flu but are not. It takes about 2 weeks for protection to develop after vaccination, and protection lasts through the flu season. 3. Some people should not get this vaccine    Tell the person who is giving you the vaccine:     If you have any severe, life-threatening allergies. If you ever had a life-threatening allergic reaction after a dose of flu vaccine, or have a severe allergy to any part of this vaccine, you may be advised not to get vaccinated. Most, but not all, types of flu vaccine contain a small amount of egg protein.  If you ever had Guillain-Barré Syndrome (also called GBS). Some people with a history of GBS should not get this vaccine. This should be discussed with your doctor.  If you are not feeling well. It is usually okay to get flu vaccine when you have a mild illness, but you might be asked to come back when you feel better. 4. Risks of a vaccine reaction    With any medicine, including vaccines, there is a chance of reactions. These are usually mild and go away on their own, but serious reactions are also possible. Most people who get a flu shot do not have any problems with it.      Minor problems following a flu shot include:    soreness, redness, or swelling where the shot was given     hoarseness   sore, red or itchy eyes   cough   fever   aches   headache   itching   fatigue  If these problems occur, they usually begin soon after the shot and last 1 or 2 days. More serious problems following a flu shot can include the following:     There may be a small increased risk of Guillain-Barré Syndrome (GBS) after inactivated flu vaccine. This risk has been estimated at 1 or 2 additional cases per million people vaccinated. This is much lower than the risk of severe complications from flu, which can be prevented by flu vaccine.  Young children who get the flu shot along with pneumococcal vaccine (PCV13) and/or DTaP vaccine at the same time might be slightly more likely to have a seizure caused by fever. Ask your doctor for more information. Tell your doctor if a child who is getting flu vaccine has ever had a seizure. Problems that could happen after any injected vaccine:      People sometimes faint after a medical procedure, including vaccination. Sitting or lying down for about 15 minutes can help prevent fainting, and injuries caused by a fall. Tell your doctor if you feel dizzy, or have vision changes or ringing in the ears.  Some people get severe pain in the shoulder and have difficulty moving the arm where a shot was given. This happens very rarely.  Any medication can cause a severe allergic reaction. Such reactions from a vaccine are very rare, estimated at about 1 in a million doses, and would happen within a few minutes to a few hours after the vaccination. As with any medicine, there is a very remote chance of a vaccine causing a serious injury or death. The safety of vaccines is always being monitored. For more information, visit: www.cdc.gov/vaccinesafety/    5. What if there is a serious reaction? What should I look for?  Look for anything that concerns you, such as signs of a severe allergic reaction, very high fever, or unusual behavior.     Signs of a severe allergic reaction can include hives, swelling of the face and throat, difficulty breathing, a fast heartbeat, dizziness, and weakness - usually within a few minutes to a few hours after the vaccination. What should I do?  If you think it is a severe allergic reaction or other emergency that cant wait, call 9-1-1 and get the person to the nearest hospital. Otherwise, call your doctor.  Reactions should be reported to the Vaccine Adverse Event Reporting System (VAERS). Your doctor should file this report, or you can do it yourself through  the VAERS web site at www.vaers. [facility].gov, or by calling 3-184.832.7664. VAERS does not give medical advice. 6. The National Vaccine Injury Compensation Program    The [facility] Vaccine Injury Compensation Program (VICP) is a federal program that was created to compensate people who may have been injured by certain vaccines. Persons who believe they may have been injured by a vaccine can learn about the program and about filing a claim by calling 8-254.214.8676 or visiting the [place] website at www.[facility].gov/vaccinecompensation. There is a time limit to file a claim for compensation. 7. How can I learn more?  Ask your healthcare provider. He or she can give you the vaccine package insert or suggest other sources of information.  Call your local or state health department.  Contact the Centers for Disease Control and Prevention (CDC):  - Call 0-868.978.6893 (1-800-CDC-INFO) or  - Visit CDCs website at www.cdc.gov/flu    Vaccine Information Statement   Inactivated Influenza Vaccine   8/7/2015  [address] [id number]    Department of Health and Human Services  Centers for Disease Control and Prevention    Office Use Only       Learning About the Mediterranean Diet  What is the Mediterranean diet? The Mediterranean diet is a style of eating rather than a diet plan. It features foods eaten in [place], [place], [place] and [place], and other countries along the [facility]. It emphasizes eating foods like fish, fruits, vegetables, beans, high-fiber breads and whole grains, nuts, and olive oil.  This style of eating includes limited red meat, cheese, and sweets. Why choose the Mediterranean diet? A Mediterranean-style diet may improve heart health. It contains more fat than other heart-healthy diets. But the fats are mainly from nuts, unsaturated oils (such as fish oils and olive oil), and certain nut or seed oils (such as canola, soybean, or flaxseed oil). These fats may help protect the heart and blood vessels. How can you get started on the Mediterranean diet? Here are some things you can do to switch to a more Mediterranean way of eating. What to eat  · Eat a variety of fruits and vegetables each day, such as grapes, blueberries, tomatoes, broccoli, peppers, figs, olives, spinach, eggplant, beans, lentils, and chickpeas. · Eat a variety of whole-grain foods each day, such as oats, brown rice, and whole wheat bread, pasta, and couscous. · Eat fish at least 2 times a week. Try tuna, salmon, mackerel, lake trout, herring, or sardines. · Eat moderate amounts of low-fat dairy products, such as milk, cheese, or yogurt. · Eat moderate amounts of poultry and eggs. · Choose healthy (unsaturated) fats, such as nuts, olive oil, and certain nut or seed oils like canola, soybean, and flaxseed. · Limit unhealthy (saturated) fats, such as butter, palm oil, and coconut oil. And limit fats found in animal products, such as meat and dairy products made with whole milk. Try to eat red meat only a few times a month in very small amounts. · Limit sweets and desserts to only a few times a week. This includes sugar-sweetened drinks like soda. The Mediterranean diet may also include red wine with your meal--1 glass each day for women and up to 2 glasses a day for men. Tips for eating at home  · Use herbs, spices, garlic, lemon zest, and citrus juice instead of salt to add flavor to foods. · Add avocado slices to your sandwich instead of linares. · Have fish for lunch or dinner instead of red meat.  Brush the fish with olive oil, and broil or grill it. · Sprinkle your salad with seeds or nuts instead of cheese. · Cook with olive or canola oil instead of butter or oils that are high in saturated fat. · Switch from 2% milk or whole milk to 1% or fat-free milk. · Dip raw vegetables in a vinaigrette dressing or hummus instead of dips made from mayonnaise or sour cream.  · Have a piece of fruit for dessert instead of a piece of cake. Try baked apples, or have some dried fruit. Tips for eating out  · Try broiled, grilled, baked, or poached fish instead of having it fried or breaded. · Ask your  to have your meals prepared with olive oil instead of butter. · Order dishes made with marinara sauce or sauces made from olive oil. Avoid sauces made from cream or mayonnaise. · Choose whole-grain breads, whole wheat pasta and pizza crust, brown rice, beans, and lentils. · Cut back on butter or margarine on bread. Instead, you can dip your bread in a small amount of olive oil. · Ask for a side salad or grilled vegetables instead of french fries or chips. Where can you learn more? Go to http://darrell-saman.info/. Enter 147-039-1840 in the search box to learn more about \"Learning About the Mediterranean Diet. \"  Current as of: March 29, 2018  Content Version: 11.8  © 0954-0884 [employer]. Care instructions adapted under license by [employer] (which disclaims liability or warranty for this information). If you have questions about a medical condition or this instruction, always ask your healthcare professional. [place] [address] any warranty or liability for your use of this information. Using a Nasal Steroid Spray: Care Instructions  Your Care Instructions    Your doctor may suggest using a corticosteroid nasal spray for your allergy symptoms or sinus problems. These sprays reduce the swelling inside the nose and sinuses.  Unlike decongestant nasal sprays, steroid sprays won't lead to more swelling when you stop taking them. These sprays start working in a few days, but it may take several weeks before you get the full effect. Most side effects are minor. The most common complaint is a burning feeling in the nose right after the spray is used. Some people get nosebleeds. Follow-up care is a key part of your treatment and safety. Be sure to make and go to all appointments, and call your doctor if you are having problems. It's also a good idea to know your test results and keep a list of the medicines you take. How can you care for yourself at home? Here are some tips for using these sprays:  · You may need to prime the sprayer before you use it. This means spraying it into the air a few times to make sure you get the right amount of medicine. Follow the directions on the label. · Blow your nose before you spray. This will help clear out your nostrils. · Gently sniff the medicine into your nose as you spray. Don't snort, or the medicine will go all the way into your throat where it won't do much good. · Aim the nozzle straight toward the outer wall of your nostril. This will help keep the medicine from irritating the inner walls of your nose, especially your septum (the wall that separates your left and right nostrils). · Don't blow your nose for 10 minutes or so after you spray. And try not to sneeze. · Be safe with medicines. Use this medicine exactly as prescribed. Call your doctor if you think you are having a problem with your medicine. · Clean your sprayer once a week. Read the label to learn how. When should you call for help? Watch closely for changes in your health, and be sure to contact your doctor if you have any problems. Where can you learn more? Go to http://darrell-saman.info/. Enter B647 in the search box to learn more about \"Using a Nasal Steroid Spray: Care Instructions. \"  Current as of: March 28, 2018  Content Version: 11.8  © 3468-5929 [employer]. Care instructions adapted under license by [employer] (which disclaims liability or warranty for this information). If you have questions about a medical condition or this instruction, always ask your healthcare professional. [address] any warranty or liability for your use of this information. Use Flonase (fluticasone) or Nasacort AQ (triamcinolone) 2 sprays each nostril daily for the nasal congestion and this will help the ears too. Use for 2-4 weeks straight. Then you may try lowering down to one spray daily as maintenance (and increase back up to 2 sprays if your symptoms increase).

## 2019-07-29 ENCOUNTER — OFFICE VISIT (OUTPATIENT)
Dept: FAMILY MEDICINE CLINIC | Age: 34
End: 2019-07-29

## 2019-07-29 VITALS
RESPIRATION RATE: 20 BRPM | OXYGEN SATURATION: 98 % | HEIGHT: 65 IN | DIASTOLIC BLOOD PRESSURE: 66 MMHG | HEART RATE: 70 BPM | WEIGHT: 293 LBS | SYSTOLIC BLOOD PRESSURE: 124 MMHG | BODY MASS INDEX: 48.82 KG/M2 | TEMPERATURE: 98.4 F

## 2019-07-29 DIAGNOSIS — Z13.1 DIABETES MELLITUS SCREENING: ICD-10-CM

## 2019-07-29 DIAGNOSIS — M25.562 LEFT KNEE PAIN, UNSPECIFIED CHRONICITY: Primary | ICD-10-CM

## 2019-07-29 DIAGNOSIS — Z13.220 LIPID SCREENING: ICD-10-CM

## 2019-07-29 DIAGNOSIS — Z83.3 FAMILY HISTORY OF DIABETES MELLITUS: ICD-10-CM

## 2019-07-29 DIAGNOSIS — R53.83 FATIGUE, UNSPECIFIED TYPE: ICD-10-CM

## 2019-07-29 DIAGNOSIS — Z76.89 ENCOUNTER TO ESTABLISH CARE: ICD-10-CM

## 2019-07-29 DIAGNOSIS — R87.619 ABNORMAL CERVICAL PAPANICOLAOU SMEAR, UNSPECIFIED ABNORMAL PAP FINDING: ICD-10-CM

## 2019-07-29 DIAGNOSIS — D64.9 ANEMIA, UNSPECIFIED TYPE: ICD-10-CM

## 2019-07-29 DIAGNOSIS — E55.9 VITAMIN D DEFICIENCY: ICD-10-CM

## 2019-07-29 DIAGNOSIS — E66.01 OBESITY, MORBID (HCC): ICD-10-CM

## 2019-07-29 DIAGNOSIS — Z00.00 LABORATORY EXAMINATION ORDERED AS PART OF A ROUTINE GENERAL MEDICAL EXAMINATION: ICD-10-CM

## 2019-07-29 DIAGNOSIS — E53.8 VITAMIN B12 DEFICIENCY: ICD-10-CM

## 2019-07-29 DIAGNOSIS — E66.01 MORBID OBESITY WITH BMI OF 45.0-49.9, ADULT (HCC): ICD-10-CM

## 2019-07-29 DIAGNOSIS — Z76.89 ENCOUNTER FOR WEIGHT MANAGEMENT: ICD-10-CM

## 2019-07-29 DIAGNOSIS — Z86.2 HISTORY OF ANEMIA: ICD-10-CM

## 2019-07-29 PROBLEM — Z98.891 PREVIOUS CESAREAN SECTION: Status: RESOLVED | Noted: 2017-06-30 | Resolved: 2019-07-29

## 2019-07-29 NOTE — PROGRESS NOTES
Chief Complaint   Patient presents with    Weight Management     Patient would like to discuss/ Room 4         HPI:  The patient is a 27 y.o. female who presents today for a follow up appointment. No hospital, ER or specialist visits since last primary care visit except as noted below. Weight Mgmt:          The patient is a 27y.o. year old obese female who presents today for medical weight loss, her PCP is Joanne Cagle PA-C. Pt's initial weight is 295 pounds with a BMI of 49.85 on 07/29/19. Patient's highest weight is her current initial weight. Patient's goal weight is 195 to 210 pounds. The patient's bariatric comorbidities include chronic knee pain, GERD (currently not an issue). The patient's reason for medical weight loss is improving energy and overall health. The patient's quality of life goals are energy to keep up with her kids, no longer feeling \"uncomfortable in her body\". The patient's biggest struggle with losing weight has been: different life situations (losing her parents, staying home with her 5 kids - cooking more food for every kid she has had), she loves to bake. Her brother is currently in a weight management program in [place], [place] and is taking Phentermine. He has done extremely well and she would like to consider this option. The patient's diet typically includes: B: linares, eggs, bread (waffles, Spanish toast), L: anything, usually nothing healthy, hot dogs, chicken nuggets, hamburger, S: chips, D: veggies, meat; she finds herself snacking for energy. She is also keeping her brother's baby who does not sleep well so she will snack to stay awake or drink soda.          The patient is drinking the following: soda, juice (all different types of juice), water (here and there, at least a bottle/day), milk with cereal - does not drink milk by itself, socially drink (twice/week at most - 2 beers, very seldom has liquor)         The patient's exercise at this time includes: nothing formal, however active with her 5 children         The patient is currently weighing: none         The patient has not tried appetite suppressants in the past.  She has previously tried the following medications: none. She has considered bariatric surgery. She did a [employer] in [id number], she lost down to around 185 - 210 pounds. At that time she was still working and was more active. Postpartum Depression:  After her last baby who is now 3years old. Her mother  2017, then had her son on 2017, then her father passed approximately 11 months after that. She does not remember taking anything for this. Her symptoms are resolved at this time. Labs:  Needs to be done today    EKG:  Done in the office today, results: NSR    Review of Systems  A comprehensive review of systems was negative except for that written in the HPI.     Patient Active Problem List   Diagnosis Code    Knee pain, left M25.562    H/O  section Z98.891    Twins [id number]    Obesity, morbid ([address] E66.01    H/O sickle cell trait Z86.2    Encounter for weight management Z76.89    Family history of diabetes mellitus Z83.3    Morbid obesity with BMI of 45.0-49.9, adult ([address] E66.01, Z68.42    History of anemia Z86.2       Past Medical History:   Diagnosis Date    Abnormal Papanicolaou smear of cervix     Leep procedure in     Anemia     \"borderline\" no supplement    Knee pain, left 2013    Postpartum depression 2017    after last baby, who is 2 years at this time       Past Surgical History:   Procedure Laterality Date     DELIVERY ONLY      2009 - boy, 10/18/2013 - girl, 10/6/2014 - twins (girl and boy), 2017 - boy    HX  SECTION  2009    HX DILATION AND CURETTAGE      HX LEEP PROCEDURE         Social History     Tobacco Use    Smoking status: Never Smoker    Smokeless tobacco: Never Used   Substance Use Topics    Alcohol use: No     Comment: socially    Drug use: No       Family History   Problem Relation Age of Onset    Diabetes Mother     Hypertension Mother     Heart Attack Mother     Other Mother          of pancreatitis complications    Diabetes Father     Heart Disease Father     Asthma Brother     Stroke Brother 32        He was incarcerated at the time, possible \"poisoning\" but never had autopsy.  No Known Problems Sister     Cancer Cousin         breast cancer    No Known Problems Sister     No Known Problems Sister            No current outpatient medications on file prior to visit. No current facility-administered medications on file prior to visit. No Known Allergies    PE:  Visit Vitals  /66 (BP 1 Location: Right arm, BP Patient Position: Sitting)   Pulse 70   Temp 98.4 °F (36.9 °C) (Oral)   Resp 20   Ht 5' 4.5\" (1.638 m)   Wt 295 lb (133.8 kg)   LMP 2019 (Approximate)   SpO2 98%   BMI 49.85 kg/m²       Gen: alert, oriented, no acute distress  Head: normocephalic, atraumatic  Ears: external auditory canals clear, TMs without erythema or effusion  Eyes: pupils equal round reactive to light, sclera clear, conjunctiva clear  Oral: moist mucus membranes, no oral lesions, no pharyngeal inflammation or exudate  Neck: symmetric normal sized thyroid, no carotid bruits, no jugular vein distention  Resp: no increase work of breathing, lungs clear to ausculation bilaterally, no wheezing, rales or rhonchi  CV: S1, S2 normal.  No murmurs, rubs, or gallops. Abd: soft, not tender, not distended. No hepatosplenomegaly. Normal bowel sounds. No hernias. No abdominal or renal bruits. Neuro: cranial nerves intact, normal strength and movement in all extremities, reflexes and sensation intact and symmetric. Skin: no lesion or rash  Extremities: no cyanosis or edema    No results found for this visit on 19. Assessment/Plan:    ICD-10-CM ICD-9-CM    1.  Left knee pain, unspecified chronicity M25.562 719.46    2. Obesity, morbid ([facility] [address] E66.01 278.01 URINALYSIS W/ RFLX MICROSCOPIC      VITAMIN B12      LIPID PANEL      METABOLIC PANEL, COMPREHENSIVE      VITAMIN D, 25 HYDROXY      TSH REFLEX TO T4      HEMOGLOBIN A1C WITH EAG      CBC WITH AUTOMATED DIFF      FERRITIN      IRON PROFILE   3. Diabetes mellitus screening Z13.1 V77.1 URINALYSIS W/ RFLX MICROSCOPIC      METABOLIC PANEL, COMPREHENSIVE      HEMOGLOBIN A1C WITH EAG   4. Encounter to establish care Z76.89 V65.8 URINALYSIS W/ RFLX MICROSCOPIC      VITAMIN B12      LIPID PANEL      METABOLIC PANEL, COMPREHENSIVE      VITAMIN D, 25 HYDROXY      TSH REFLEX TO T4      HEMOGLOBIN A1C WITH EAG      CBC WITH AUTOMATED DIFF      FERRITIN      IRON PROFILE   5. Fatigue, unspecified type R53.83 780.79 URINALYSIS W/ RFLX MICROSCOPIC      VITAMIN B12      LIPID PANEL      METABOLIC PANEL, COMPREHENSIVE      VITAMIN D, 25 HYDROXY      TSH REFLEX TO T4      HEMOGLOBIN A1C WITH EAG      CBC WITH AUTOMATED DIFF      FERRITIN      IRON PROFILE   6. Encounter for weight management Z76.89 V65.49 URINALYSIS W/ RFLX MICROSCOPIC      VITAMIN B12      LIPID PANEL      METABOLIC PANEL, COMPREHENSIVE      VITAMIN D, 25 HYDROXY      TSH REFLEX TO T4      HEMOGLOBIN A1C WITH EAG      CBC WITH AUTOMATED DIFF      FERRITIN      IRON PROFILE      AMB POC EKG ROUTINE W/ 12 LEADS, INTER & REP   7. Vitamin D deficiency E55.9 268.9 VITAMIN D, 25 HYDROXY   8. Lipid screening Z13.220 V77.91 LIPID PANEL   9. Family history of diabetes mellitus Z83.3 V18.0 URINALYSIS W/ RFLX MICROSCOPIC      METABOLIC PANEL, COMPREHENSIVE      HEMOGLOBIN A1C WITH EAG   10.  Laboratory examination ordered as part of a routine general medical examination Z00.00 V72.62 URINALYSIS W/ RFLX MICROSCOPIC      VITAMIN B12      LIPID PANEL      METABOLIC PANEL, COMPREHENSIVE      VITAMIN D, 25 HYDROXY      TSH REFLEX TO T4      HEMOGLOBIN A1C WITH EAG      CBC WITH AUTOMATED DIFF      FERRITIN      IRON PROFILE   11. Morbid obesity with BMI of 45.0-49.9, adult ([facility]) E66.01 278.01 URINALYSIS W/ RFLX MICROSCOPIC    Z68.42 V85.42 VITAMIN B12      LIPID PANEL      METABOLIC PANEL, COMPREHENSIVE      VITAMIN D, 25 HYDROXY      TSH REFLEX TO T4      HEMOGLOBIN A1C WITH EAG      CBC WITH AUTOMATED DIFF      FERRITIN      IRON PROFILE   12. History of anemia Z86.2 V12.3 CBC WITH AUTOMATED DIFF      FERRITIN      IRON PROFILE   13. Abnormal cervical Papanicolaou smear, unspecified abnormal pap finding R87.619 795.00    14. Anemia, unspecified type D64.9 285.9      Diagnoses and all orders for this visit:    1. Left knee pain, unspecified chronicity    2. Obesity, morbid ([address]  -     URINALYSIS W/ RFLX MICROSCOPIC  -     VITAMIN B12  -     LIPID PANEL  -     METABOLIC PANEL, COMPREHENSIVE  -     VITAMIN D, 25 HYDROXY  -     TSH REFLEX TO T4  -     HEMOGLOBIN A1C WITH EAG  -     CBC WITH AUTOMATED DIFF  -     FERRITIN  -     IRON PROFILE    3. Diabetes mellitus screening  -     URINALYSIS W/ RFLX MICROSCOPIC  -     METABOLIC PANEL, COMPREHENSIVE  -     HEMOGLOBIN A1C WITH EAG    4. Encounter to establish care  -     URINALYSIS W/ RFLX MICROSCOPIC  -     VITAMIN B12  -     LIPID PANEL  -     METABOLIC PANEL, COMPREHENSIVE  -     VITAMIN D, 25 HYDROXY  -     TSH REFLEX TO T4  -     HEMOGLOBIN A1C WITH EAG  -     CBC WITH AUTOMATED DIFF  -     FERRITIN  -     IRON PROFILE    5. Fatigue, unspecified type  -     URINALYSIS W/ RFLX MICROSCOPIC  -     VITAMIN B12  -     LIPID PANEL  -     METABOLIC PANEL, COMPREHENSIVE  -     VITAMIN D, 25 HYDROXY  -     TSH REFLEX TO T4  -     HEMOGLOBIN A1C WITH EAG  -     CBC WITH AUTOMATED DIFF  -     FERRITIN  -     IRON PROFILE    6.  Encounter for weight management  -     URINALYSIS W/ RFLX MICROSCOPIC  -     VITAMIN B12  -     LIPID PANEL  -     METABOLIC PANEL, COMPREHENSIVE  -     VITAMIN D, 25 HYDROXY  -     TSH REFLEX TO T4  -     HEMOGLOBIN A1C WITH EAG  -     CBC WITH AUTOMATED DIFF  - FERRITIN  -     IRON PROFILE  -     AMB POC EKG ROUTINE W/ 12 LEADS, INTER & REP    7. Vitamin D deficiency  -     VITAMIN D, 25 HYDROXY    8. Lipid screening  -     LIPID PANEL    9. Family history of diabetes mellitus  -     URINALYSIS W/ RFLX MICROSCOPIC  -     METABOLIC PANEL, COMPREHENSIVE  -     HEMOGLOBIN A1C WITH EAG    10. Laboratory examination ordered as part of a routine general medical examination  -     URINALYSIS W/ RFLX MICROSCOPIC  -     VITAMIN B12  -     LIPID PANEL  -     METABOLIC PANEL, COMPREHENSIVE  -     VITAMIN D, 25 HYDROXY  -     TSH REFLEX TO T4  -     HEMOGLOBIN A1C WITH EAG  -     CBC WITH AUTOMATED DIFF  -     FERRITIN  -     IRON PROFILE    11. Morbid obesity with BMI of 45.0-49.9, adult ([facility])  -     URINALYSIS W/ RFLX MICROSCOPIC  -     VITAMIN B12  -     LIPID PANEL  -     METABOLIC PANEL, COMPREHENSIVE  -     VITAMIN D, 25 HYDROXY  -     TSH REFLEX TO T4  -     HEMOGLOBIN A1C WITH EAG  -     CBC WITH AUTOMATED DIFF  -     FERRITIN  -     IRON PROFILE    12. History of anemia  -     CBC WITH AUTOMATED DIFF  -     FERRITIN  -     IRON PROFILE    13. Abnormal cervical Papanicolaou smear, unspecified abnormal pap finding    14. Anemia, unspecified type      Follow-up and Dispositions    · Return in about 1 month (around 8/26/2019) for Medication Check, Weight Mgmt.       lab results and schedule of future lab studies reviewed with patient  reviewed diet, exercise and weight control - Patient would like to consider Phentermine. If her lab work is normal then we will call in Phentermine at that time. reviewed medications and side effects in detail    lose weight, increase physical activity, routine labs ordered, call if any problems    Health Maintenance reviewed - deferred to PCP. Recommended healthy diet low in carbohydrates, fats, sodium and cholesterol. Recommended regular cardiovascular exercise 3-6 times per week for 30-60 minutes daily.       Chart is reviewed and updated today in the office. Records requested for other providers patient has seen and is currently seeing. Patient was offered a choice/choices in the treatment plan today. Patient expresses understanding of the plan and agrees with recommendations. Verbal and written instructions (see AVS) provided. See patient instructions for more. Patient expresses understanding of diagnosis and treatment plan.

## 2019-07-29 NOTE — PATIENT INSTRUCTIONS
DIET AND LIFESTYLE CHANGES   Practice putting your fork and spoon down between bites.  Begin using smaller plates, bowls, and utensils. Ex: Salad Plate instead of dinner plate, salad fork instead of regular fork.  EAT SLOWLY! It should take you at least 20-30 minutes to finish a meal.      Please begin taking a Complete Multivitamin every day     It is recommended that you purchase kitchen measuring cups if you do not already have a set     Guidelines for Success    *Foods Not recommended*     Carbohydrates   o They do not contain protein and will prevent weight loss. Breads Rice  Cereal Crackers Corn                   Pasta  Grains Chips  Cakes  Muffins     Pretzels Popcorn Grits  Oatmeal  Peas               Tortillas        Granola Waffles Pancakes      Biscuits                        Cream of Wheat   Potatoes    Cookies    Cornbread    Bagels       Sugary Foods     Cookies Cakes  Chocolate Ice Cream Milkshakes     Juice  Jelly  Sauces BBQ Sauce Candy      Fatty Foods   Pizza       Fried Foods    Creamy Sauces/Gravy                  Cheesecake      Full Fat Catherine          Full Fat Salad Dressing                              Peanut Butter    Creamy Soups     PROTEIN LIST - What you need to know    Protein is essential for growth and development. It is part of every living cell and makes up tissue, skin, bone, hair, blood and muscle. It is especially important for those having bariatric surgery to follow a high protein, low carbohydrate diet. Please Note     Lean protein sources that are naturally dry in texture may be more difficult tolerate.  (ex: chicken breast)   It is important to avoid over cooking lean proteins (ie chicken breast, beef/pork tenderloin)   Small bites and thorough chewing are essential for the tolerance of protein rich foods.    Sometimes small sips of water may be needed to help food go down       Foods High in Protein         [place] [facility])   Chicken Breast     Chicken Thigh or Leg   Fish       Canned Tuna  Clams/Mussels/Oysters      Crab Meat     Pork Tenderloin     Ground Sirloin    Lorenzo       Venison      Low Fat Cheese     Low Fat Cottage Cheese    Egg       Greek Yogurt     Non Greek Yogurt        FLUID INTAKE      NOT recommended - beverages with sugar, carbonation (ie bubbles)   Soda                                      Fruit Juice/V8             Sparkling water   Diet soda                              Sports Drinks   Lemonade                            Energy Drinks      Sweet Tea                            Gourmet Coffee Beverages (ie Starbucks)   Smoothies                            Alcoholic Beverages     Recommended - sugar free, non-carbonated liquids   Water                                     Unsweet Tea-may use sugar substitute   Crystal Light                          Diet Adelita Green Tea; Diet [place] Tea   [place]                                         Decaf Coffee/Decaf Tea: use sugar free creamer   Diet Lemonade                     Propel   Diet Ocean Spray Juice       Sugar Free Jell-O- Limit to 1 cup per day   Diet V8 Splash                      Sugar Free Popsicles- Limit to 2 per day                                                  * Avoid No Added Sugar Fudgesicles   Diet Snapple                         Clear Broth (Chicken, Vegetable, Beef)   Fruit 2 O                                Vitamin Water Zero/PowerAde Zero  Hint Water     Recommended Meal Replacement/Protein Shakes         Premier Protein - Found at [facility], [employer], [employer], [employer]   EAS AdvantEdge Carb Control Shake   Slimfast High Protein - this is the only acceptable Slimfast Product   Ensure High Protein- This is the only Acceptable Ensure Product    [facility] Ad[facility]   Bariatric Advantage High Protein Meal Replacement (online www.bariatricadvantage. com)     Muscle Milk 100 calorie    Pure Protein Drinks    Boost Glucose Control - this is the ONLY acceptable Boost Product   Peyman Horton Protein Powders - Egg based protein powder - does not contain whey   GNC Pro Performance 100% Soy Isolate - does not contain whey    If you decide to choose a shake that is not on the above list. Please be sure it has the following:     Less than 230 calories    At least 15 or more grams of protein    Less than 20 grams of carbohydrates    Less than 10 grams of fat    Please read the nutrition label carefully! Nutrition Resources     These website and apps allow you to track your food, fluid, and activity:  o My Fitness Pal  o Lose It  o Fit Day  o My Calorie Counter     This website offers a FREE search engine and has a giant food and restaurant data base: wwwKroll [employer]      CHECK YOUR SMART PHONE!!   Most phones today have free online tracker apps built in! Websites with Recipes   www.Uversity  - this site also contains a recipe calculator    www. AppFog  CookBooks   Deliciously Healthy Recipes from [place] by the Time Trav. Can be purchased at IPTEGO     LETS GET MOVING!!   A pedometer is an easy, inexpensive way to challenge yourself daily to increase activity. Track your steps day one and aim to increase by 250 daily! Helpful Links and Resources  The following websites may provide you with helpful information and resources. www. CalorieKing. com   \"Calorie [place]\" is an amazing resource where you can type in any food item you can think of whether at home or at restaurant and they will immediately provide you with detailed nutrition information regarding calories, fat, carbohydrate, protein, and sodium to name a few. We encourage you to start looking at the nutritional details of the foods that you regularly or periodically eat. You might be amazed by what you find.     www. ServiceMax. Remedy Systems  The SpongeFish (or smartphone gloria) is a useful tool to find the lowest prices of generic medications at your local pharmacies.  On the website type in the name of your medication, choose the pharmacy, and print the coupon to bring to the pharmacy. On the smartphone gloria type in the name of the medication, choose the pharmacy, and show the coupon code to the pharmacy staff to receive the discount.     www. ObesityAction. org  The Obesity Action Coalition [facility] is a more than 54,000 member non-profit organization dedicated to giving a voice to individuals affected by the disease of obesity. The [facility] helps individuals along their journey toward better health through education, advocacy, and support. Membership is $10 per year and whether you are a member or not the [address] [place] [address] has amazing educational material and resources available on IguanaFix. Earbits. GeriJoy  Links to free online workout videos of any variety including chair exercises, resistance bands, strength, cardio, and many other exercise video options    http://NutritionData. [place].[employer]  \"Self Nutrition Data\" is an extremely detailed resource where you can look up the detailed nutritional information of essentially any food. Not only does it break down the \"macronutrients \"such carbohydrate, proteins, fats and fibers, but it also lists all the detailed \"micronutrient\" breakdowns of all vitamins and minerals to a degree not seen in other websites. www. Twibingoometer. com  \"Map Pedometer\" is an interactive Google Map where you can accurately measure the distance of a walking or running route that you take. When you get to the website, at the top of the page, type in your starting point (street address, city, and state). Once your location is found, click on \"MAP\" on the top right of the Google Map so that you can see the streets around you. You can zoom in and out on the map using your scroll wheel. Your starting location is marked with a \"yellow darya\" . .. from there double click on the points on street map tracing your route.  This will tell you the exact distance of your route and can also help you plan future running/walking/biking routes. www. FitBit. com  The FitBit is an amazing electronic device that you wear which accurately counts your steps, your distance, your vertical steps up stairs, and even measures your sleep. This device syncs to your computer, iPhone, or Smart Phone and you can accurately review all your data over time, and even share it with others to form a \"friend\" group.     www. ObesityMedicine. org  The Obesity Medicine Association (SUMIT) is the largest medical society for physicians and healthcare providers that specialize in the field of weight loss (obesity medicine / bariatric medicine). This website also lists all the physicians in your area that specialize in weight loss.    www. Atlantia SearchPal.com  MyFitnessPal is a smartphone ap that allows you to accurately log your food intake and even uses the smartphone camera to scan food package barcodes. It has an extensive food database and can also be synced to your regular computer as well. This ap can also sync with the FitBit to track both your food intake (MyFitnessPal) and exercise (Fitbit) information. www. Diabetes. org  This website is for the American Diabetes Association (ADA) and has extensive information about diabetes in general, living with diabetes, nutrition recommendations, and detailed information about all things pertaining to diabetes. www. EatRight. org  This website is for the American Dietetic Association (ADA) and is a vast wealth if information regarding nutrition and \"'eating right. \" I would encourage you to explore their website to find information that you can find helpful, practical, and enjoyable. REMEMBER    The most effective way to track your intake is by keeping a Daily Food Journal.  Writing down the items you eat with their calorie, carbohydrate, and protein totals, is key to your life long success. GETTING STARTED CHECKLIST    ? Clean House!   Eliminate carbohydrates and liquid calories from your diet and your environment. ? Read all food labels! ? Record your daily intake in a food diary. ? Begin planning your meals and snacks in advance. ? Begin using smaller plates and utensils - this will assist in decreasing your portion sizes of food. ? Practice chewing foods slowly prior to swallowing. ? Include a lean protein source at all meals. ? Sip 48-64 oz (6-8 cups) of fluids throughout the day for hydration. ? Limit restaurant meals. When dining out, ask for a box with the delivery of your meal.  Only put the appropriate portion on your plate, place the rest in the box or share with a . ? Add movement to your day! Whether swimming, walking, or biking activity is essential to meet and maintain your weight loss goals. ? Set reasonable and attainable goals. 2 lbs weekly weight loss is excellent! ? Surround yourself with positive influences and supportive individuals. ? Begin taking a Multivitamin Supplement Daily  ? Take a deep breath and smile!!  REMEMBER - YOU CAN DO THIS!!! These are the initial steps to what will be a positive, healthy, and amazing life change. Learning About Low-Carbohydrate Diets for Weight Loss  What is a low-carbohydrate diet? Low-carb diets avoid foods that are high in carbohydrate. These high-carb foods include pasta, bread, rice, cereal, fruits, and starchy vegetables. Instead, these diets usually have you eat foods that are high in fat and protein. Many people lose weight quickly on a low-carb diet. But the early weight loss is water. People on this diet often gain the weight back after they start eating carbs again. Not all diet plans are safe or work well. A lot of the evidence shows that low-carb diets aren't healthy. That's because these diets often don't include healthy foods like fruits and vegetables. Losing weight safely means balancing protein, fat, and carbs with every meal and snack.  And low-carb diets don't always provide the vitamins, minerals, and fiber you need. If you have a serious medical condition, talk to your doctor before you try any diet. These conditions include kidney disease, heart disease, type 2 diabetes, high cholesterol, and high blood pressure. If you are pregnant, it may not be safe for your baby if you are on a low-carb diet. How can you lose weight safely? You might have heard that a diet plan helped another person lose weight. But that doesn't mean that it will work for you. It is very hard to stay on a diet that includes lots of big changes in your eating habits. If you want to get to a healthy weight and stay there, making healthy lifestyle changes will often work better than dieting. These steps can help. · Make a plan for change. Work with your doctor to create a plan that is right for you. · See a dietitian. He or she can show you how to make healthy changes in your eating habits. · Manage stress. If you have a lot of stress in your life, it can be hard to focus on making healthy changes to your daily habits. · Track your food and activity. You are likely to do better at losing weight if you keep track of what you eat and what you do. Follow-up care is a key part of your treatment and safety. Be sure to make and go to all appointments, and call your doctor if you are having problems. It's also a good idea to know your test results and keep a list of the medicines you take. Where can you learn more? Go to http://darrell-saman.info/. Enter A121 in the search box to learn more about \"Learning About Low-Carbohydrate Diets for Weight Loss. \"  Current as of: November 7, 2018  Content Version: 12.1  © 1380-8318 [employer]. Care instructions adapted under license by [employer] (which disclaims liability or warranty for this information).  If you have questions about a medical condition or this instruction, always ask your healthcare professional. [employer], [facility] disclaims any warranty or liability for your use of this information. Learning About Obesity  What is obesity? Obesity means having a body mass index (BMI) of 30 or above. BMI is a number that is calculated from your weight and your height. How do you know if your weight is in the obesity range? To know if your weight is in the obesity range, your doctor looks at your body mass index (BMI) and waist size. BMI is a number that is calculated from your weight and your height. To figure out your BMI for yourself, you can use an online tool, such as http://www.amaro.com/ on the [employer] of [employer]. If your BMI is 30.0 or higher, it falls within the obesity range. Keep in mind that BMI and waist size are only guides. They are not tools to determine your ideal body weight. What causes obesity? When you take in more calories than you burn off, you gain weight. How you eat, how active you are, and other things affect how your body uses calories and whether you gain weight. If you have family members who have too much body fat, you may have inherited a tendency to gain weight. And your family also helps form your eating and lifestyle habits, which can lead to obesity. Also, our busy lives make it harder to plan and cook healthy meals. For many of us, it's easier to reach for prepared foods, go out to eat, or go to the drive-through. But these foods are often high in saturated fat and calories. Portions are often too large. What can you do to reach a healthy weight? Focus on health, not diets. Diets are hard to stay on and don't work in the long run. It is very hard to stay with a diet that includes lots of big changes in your eating habits. Instead of a diet, focus on lifestyle changes that will improve your health and achieve the right balance of energy and calories.  To lose weight, you need to burn more calories than you take in. You can do it by eating healthy foods in reasonable amounts and becoming more active, even a little bit every day. Making small changes over time can add up to a lot. Make a plan for change. Many people have found that naming their reasons for change and staying focused on their plan can make a big difference. Work with your doctor to create a plan that is right for you. · Ask yourself: Henry Bueno are my personal, most powerful reasons for wanting this change? What will my life look like when I've made the change? \"  · Set your long-term goal. Make it specific, such as \"I will lose x pounds. \"  · Break your long-term goal into smaller, short-term goals. Make these small steps specific and within your reach, things you know you can do. These steps are what keep you going from day to day. Talk with your doctor about other weight-loss options. If you have a BMI in a certain range and have not been able to lose weight with diet and exercise, medicine or surgery may be an option for you. Before your doctor will prescribe medicines or surgery, he or she will probably want you to be more active and follow your healthy eating plan for a period of time. These habits are key lifelong changes for managing your weight, with or without other medical treatment. And these changes can help you avoid weight-related health problems. How can you stay on your plan for change? Be ready. Choose to start during a time when there are few events that might trigger slip-ups, like holidays, social events, and high-stress periods. Decide on your first few steps. Most people have more success when they make small changes, one step at a time. For example, you might switch a daily candy bar to a piece of fruit, walk 10 minutes more, or add more vegetables to a meal.  Line up your support people. Make sure you're not going to be alone as you make this change. Connect with people who understand how important it is to you.  Ask family members and friends for help in keeping with your plan. And think about who could make it harder for you, and how to handle them. Try tracking. People who keep track of what they eat, feel, and do are better at losing weight. Try writing down things like:  · What and how much you eat. · How you feel before and after each meal.  · Details about each meal (like eating out or at home, eating alone, or with friends or family). · What you do to be active. Look and plan. As you track, look for patterns that you may want to change. Take note of:  · When you eat and whether you skip meals. · How often you eat out. · How many fruits and vegetables you eat. · When you eat beyond feeling full. · When and why you eat for reasons other than being hungry. When you stray from your plan, don't get upset. Figure out what made you slip up and how you can fix it. Can you take medicines or have surgery to lose weight? If you have a BMI in a certain range and have not been able to lose weight with diet and exercise, medicine or surgery may be an option for you. If you have a BMI of at least 30.0 (or a BMI of at least 27.0 and another health problem related to your weight), ask your doctor about weight-loss medicines. They work by making you feel less hungry, making you feel full more quickly, or changing how you digest fat. Medicines are used along with diet changes and more physical activity to help you make lasting changes. If you have a BMI of 40.0 or more (or a BMI of 35.0 or more and another health problem related to your weight), your doctor may talk with you about surgery. Weight-loss surgery has risks, and you will need to work with your doctor to compare the risk of having obesity with the risks of surgery. With any option you choose, you will still need to eat a healthy diet and get regular exercise. Follow-up care is a key part of your treatment and safety.  Be sure to make and go to all appointments, and call your doctor if you are having problems. It's also a good idea to know your test results and keep a list of the medicines you take. Where can you learn more? Go to http://darrell-saman.info/. Enter N111 in the search box to learn more about \"Learning About Obesity. \"  Current as of: March 28, 2019  Content Version: 12.1  © 3684-7244 Healthwise, [employer]. Care instructions adapted under license by [employer] (which disclaims liability or warranty for this information). If you have questions about a medical condition or this instruction, always ask your healthcare professional. [address] any warranty or liability for your use of this information.

## 2019-07-29 NOTE — PROGRESS NOTES
Arcelia Lackey  Identified pt with two pt identifiers(name and ). Chief Complaint   Patient presents with    Weight Management     Patient would like to discuss/ Room 4       1. Have you been to the ER, urgent care clinic since your last visit? n  Hospitalized since your last visit? n    2. Have you seen or consulted any or health care providers outside of the [address] [place] [address] since your last visit? Include any pap smears or colon screening. n      Advance Care Planning    In the event something were to happen to you and you were unable to speak on your behalf, do you have an Advance Directive/ Living Will in place stating your wishes? NO    If yes, do we have a copy on file NO    If no, would you like information NO    Medication reconciliation up to date and corrected with patient at this time. Today's provider has been notified of reason for visit, vitals and flowsheets obtained on patients. Reviewed record in preparation for visit, huddled with provider and have obtained necessary documentation.       Health Maintenance Due   Topic    PAP AKA CERVICAL CYTOLOGY        Wt Readings from Last 3 Encounters:   19 295 lb (133.8 kg)   19 291 lb 8 oz (132.2 kg)   10/29/18 291 lb (132 kg)     Temp Readings from Last 3 Encounters:   19 98.4 °F (36.9 °C) (Oral)   19 98 °F (36.7 °C) (Oral)   18 97.5 °F (36.4 °C)     BP Readings from Last 3 Encounters:   19 124/66   19 112/68   18 113/66     Pulse Readings from Last 3 Encounters:   19 70   19 94   18 91     Vitals:    19 0900   BP: 124/66   Pulse: 70   Resp: 20   Temp: 98.4 °F (36.9 °C)   TempSrc: Oral   SpO2: 98%   Weight: 295 lb (133.8 kg)   Height: 5' 4.5\" (1.638 m)   PainSc:   0 - No pain   LMP: 2019         Learning Assessment:  :     Learning Assessment 2019   PRIMARY LEARNER Patient Patient   HIGHEST LEVEL OF EDUCATION - PRIMARY LEARNER  GRADUATED HIGH SCHOOL OR GED GRADUATED HIGH SCHOOL OR GED   BARRIERS PRIMARY LEARNER NONE -   CO-LEARNER CAREGIVER No -   PRIMARY LANGUAGE ENGLISH ENGLISH   LEARNER PREFERENCE PRIMARY DEMONSTRATION DEMONSTRATION   ANSWERED BY patient patient   RELATIONSHIP SELF SELF       Depression Screening:  :     3 most recent PHQ Screens 7/29/2019   Little interest or pleasure in doing things Not at all   Feeling down, depressed, irritable, or hopeless Not at all   Total Score PHQ 2 0       No flowsheet data found. Fall Risk Assessment:  :     No flowsheet data found. Abuse Screening:  :     Abuse Screening Questionnaire 1/7/2019   Do you ever feel afraid of your partner? N   Are you in a relationship with someone who physically or mentally threatens you? N   Is it safe for you to go home? Y       ADL Screening:  :     ADL Assessment 7/29/2019   Feeding yourself No Help Needed   Getting from bed to chair No Help Needed   Getting dressed No Help Needed   Bathing or showering No Help Needed   Walk across the room (includes cane/walker) No Help Needed   Using the telphone No Help Needed   Taking your medications No Help Needed   Preparing meals No Help Needed   Managing money (expenses/bills) No Help Needed   Moderately strenuous housework (laundry) No Help Needed   Shopping for personal items (toiletries/medicines) No Help Needed   Shopping for groceries No Help Needed   Driving No Help Needed   Climbing a flight of stairs No Help Needed   Getting to places beyond walking distances No Help Needed           BMI:  Weight Metrics 7/29/2019 7/29/2019 1/7/2019 10/29/2018 10/16/2017 6/30/2017 6/29/2017   Weight - 295 lb 291 lb 8 oz 291 lb 291 lb 4 oz 281 lb 281 lb   Waist Measure Inches 51 - - - - - -   Body Fat % 46.9 - - - - - -   BMI - 49.85 kg/m2 48.51 kg/m2 48.42 kg/m2 48.47 kg/m2 46.76 kg/m2 46.76 kg/m2     MMW 35.2  BWW 41.0  BMR 2019      Medication reconciliation up to date and corrected with patient at this time.

## 2019-07-30 LAB
25(OH)D3+25(OH)D2 SERPL-MCNC: 13.3 NG/ML (ref 30–100)
ALBUMIN SERPL-MCNC: 4.4 G/DL (ref 3.5–5.5)
ALBUMIN/GLOB SERPL: 1.5 {RATIO} (ref 1.2–2.2)
ALP SERPL-CCNC: 80 IU/L (ref 39–117)
ALT SERPL-CCNC: 13 IU/L (ref 0–32)
APPEARANCE UR: CLEAR
AST SERPL-CCNC: 12 IU/L (ref 0–40)
BASOPHILS # BLD AUTO: 0 X10E3/UL (ref 0–0.2)
BASOPHILS NFR BLD AUTO: 0 %
BILIRUB SERPL-MCNC: 0.4 MG/DL (ref 0–1.2)
BILIRUB UR QL STRIP: NEGATIVE
BUN SERPL-MCNC: 8 MG/DL (ref 6–20)
BUN/CREAT SERPL: 9 (ref 9–23)
CALCIUM SERPL-MCNC: 9.5 MG/DL (ref 8.7–10.2)
CHLORIDE SERPL-SCNC: 102 MMOL/L (ref 96–106)
CHOLEST SERPL-MCNC: 165 MG/DL (ref 100–199)
CO2 SERPL-SCNC: 25 MMOL/L (ref 20–29)
COLOR UR: YELLOW
CREAT SERPL-MCNC: 0.87 MG/DL (ref 0.57–1)
EOSINOPHIL # BLD AUTO: 0.2 X10E3/UL (ref 0–0.4)
EOSINOPHIL NFR BLD AUTO: 2 %
ERYTHROCYTE [DISTWIDTH] IN BLOOD BY AUTOMATED COUNT: 13 % (ref 12.3–15.4)
EST. AVERAGE GLUCOSE BLD GHB EST-MCNC: 111 MG/DL
FERRITIN SERPL-MCNC: 45 NG/ML (ref 15–150)
GLOBULIN SER CALC-MCNC: 2.9 G/DL (ref 1.5–4.5)
GLUCOSE SERPL-MCNC: 95 MG/DL (ref 65–99)
GLUCOSE UR QL: NEGATIVE
HBA1C MFR BLD: 5.5 % (ref 4.8–5.6)
HCT VFR BLD AUTO: 41.3 % (ref 34–46.6)
HDLC SERPL-MCNC: 46 MG/DL
HGB BLD-MCNC: 13 G/DL (ref 11.1–15.9)
HGB UR QL STRIP: NEGATIVE
IMM GRANULOCYTES # BLD AUTO: 0 X10E3/UL (ref 0–0.1)
IMM GRANULOCYTES NFR BLD AUTO: 0 %
INTERPRETATION, 910389: NORMAL
IRON SATN MFR SERPL: 20 % (ref 15–55)
IRON SERPL-MCNC: 58 UG/DL (ref 27–159)
KETONES UR QL STRIP: NEGATIVE
LDLC SERPL CALC-MCNC: 104 MG/DL (ref 0–99)
LEUKOCYTE ESTERASE UR QL STRIP: NEGATIVE
LYMPHOCYTES # BLD AUTO: 2.6 X10E3/UL (ref 0.7–3.1)
LYMPHOCYTES NFR BLD AUTO: 25 %
MCH RBC QN AUTO: 26.5 PG (ref 26.6–33)
MCHC RBC AUTO-ENTMCNC: 31.5 G/DL (ref 31.5–35.7)
MCV RBC AUTO: 84 FL (ref 79–97)
MICRO URNS: NORMAL
MONOCYTES # BLD AUTO: 0.6 X10E3/UL (ref 0.1–0.9)
MONOCYTES NFR BLD AUTO: 6 %
NEUTROPHILS # BLD AUTO: 6.9 X10E3/UL (ref 1.4–7)
NEUTROPHILS NFR BLD AUTO: 67 %
NITRITE UR QL STRIP: NEGATIVE
PH UR STRIP: 6 [PH] (ref 5–7.5)
PLATELET # BLD AUTO: 297 X10E3/UL (ref 150–450)
POTASSIUM SERPL-SCNC: 4.5 MMOL/L (ref 3.5–5.2)
PROT SERPL-MCNC: 7.3 G/DL (ref 6–8.5)
PROT UR QL STRIP: NEGATIVE
RBC # BLD AUTO: 4.9 X10E6/UL (ref 3.77–5.28)
SODIUM SERPL-SCNC: 140 MMOL/L (ref 134–144)
SP GR UR: 1.01 (ref 1–1.03)
TIBC SERPL-MCNC: 293 UG/DL (ref 250–450)
TRIGL SERPL-MCNC: 75 MG/DL (ref 0–149)
TSH SERPL DL<=0.005 MIU/L-ACNC: 2.46 UIU/ML (ref 0.45–4.5)
UIBC SERPL-MCNC: 235 UG/DL (ref 131–425)
UROBILINOGEN UR STRIP-MCNC: 0.2 MG/DL (ref 0.2–1)
VIT B12 SERPL-MCNC: 233 PG/ML (ref 232–1245)
VLDLC SERPL CALC-MCNC: 15 MG/DL (ref 5–40)
WBC # BLD AUTO: 10.3 X10E3/UL (ref 3.4–10.8)

## 2019-08-02 ENCOUNTER — TELEPHONE (OUTPATIENT)
Dept: FAMILY MEDICINE CLINIC | Age: 34
End: 2019-08-02

## 2019-08-02 PROBLEM — E55.9 VITAMIN D DEFICIENCY: Status: ACTIVE | Noted: 2019-08-02

## 2019-08-02 PROBLEM — E53.8 VITAMIN B12 DEFICIENCY: Status: ACTIVE | Noted: 2019-08-02

## 2019-08-02 RX ORDER — ERGOCALCIFEROL 1.25 MG/1
50000 CAPSULE ORAL
Qty: 5 CAP | Refills: 2 | Status: SHIPPED | OUTPATIENT
Start: 2019-08-02 | End: 2019-12-04 | Stop reason: SDUPTHER

## 2019-08-02 RX ORDER — PHENTERMINE HYDROCHLORIDE 37.5 MG/1
37.5 TABLET ORAL
Qty: 30 TAB | Refills: 0 | Status: SHIPPED | OUTPATIENT
Start: 2019-08-02 | End: 2019-08-27 | Stop reason: SDUPTHER

## 2019-08-02 NOTE — PROGRESS NOTES
Spoke with patient after obtaining 2 patient identifiers  Patient made aware of lab results. Nothing further. Patient verbalized understanding.

## 2019-08-02 NOTE — PROGRESS NOTES
Labs are reviewed, we will start Phentermine at this time. We will call this in for you today. Your lab results have been reviewed and are as follows:  Urine Test:  Your urine analysis is normal.    Your vitamin B12 is low. We can either start Vitamin B12 injections at your next visit. These would be given monthly or we can start you on an over the counter Vitamin B12 500mcg daily supplement. Please let me know which option you prefer. Cholesterol Panel: Good! Total Cholesterol is 165 (goal <200). Triglycerides are 75.  (goal <150)  Your HDL or \"good\" cholesterol is 46. (goal >40). Your LDL or \"bad\" cholesterol is 104. (goal <100). CMP:  Fasting blood sugar is normal at 95, your hemoglobin A1C is 5.5. You do not have diabetes. Kidney function is normal.   Your electrolytes are normal.   Your liver function is normal.     Vitamin D:  Your vitamin D is low at 13.3. This is an important nutrient bone health and to fight inflammation and infection. We will start a prescription to increase your Vitamin D level for the next 3 months. You will take 50,000 international units Vitamin D2 in the form of one tab taken once weekly, I have sent this prescription to your pharmacy on file. After you complete the prescription, start taking OTC Vitamin D3 2,000 international units daily. After three months we will recheck your levels. A normal value is between 40-50 on average. The symptoms of vitamin D deficiency are sometimes vague and can include tiredness and general aches and pains. Some people may not have any symptoms at all. Vitamin D also fights infections, including colds and the flu, as it regulates the expression of genes that influence your immune system to attack and destroy bacteria and viruses.       Thyroid:  Your thyroid function is normal.    CBC:  Your red blood cell count is normal.   Your white blood cell count is normal.   Your platelet count is normal.   You are not anemic and your hemoglobin is 13.0. Your Iron levels are normal.  Your Ferritin is normal.    Roya Fajardo, MSN, MHA, FNP-BC

## 2019-08-02 NOTE — TELEPHONE ENCOUNTER
Spoke with patient after obtaining 2 patient identifiers. Patient given lab results.  Patient also notified that phentermine 37.5 mg called into the pharmacy     1 po everyday in the mornings  Dispense 30 with 0 refills

## 2019-08-02 NOTE — TELEPHONE ENCOUNTER
Spoke with patient after obtaining 2 patient identifiers  Patient made aware provider will be notified she would like her lab results.

## 2019-08-27 ENCOUNTER — OFFICE VISIT (OUTPATIENT)
Dept: FAMILY MEDICINE CLINIC | Age: 34
End: 2019-08-27

## 2019-08-27 VITALS
OXYGEN SATURATION: 98 % | WEIGHT: 284 LBS | BODY MASS INDEX: 48.49 KG/M2 | DIASTOLIC BLOOD PRESSURE: 68 MMHG | HEIGHT: 64 IN | HEART RATE: 95 BPM | SYSTOLIC BLOOD PRESSURE: 129 MMHG | TEMPERATURE: 98.4 F | RESPIRATION RATE: 19 BRPM

## 2019-08-27 DIAGNOSIS — E53.8 VITAMIN B12 DEFICIENCY: Primary | ICD-10-CM

## 2019-08-27 DIAGNOSIS — Z76.89 ENCOUNTER FOR WEIGHT MANAGEMENT: ICD-10-CM

## 2019-08-27 DIAGNOSIS — E66.01 OBESITY, MORBID (HCC): ICD-10-CM

## 2019-08-27 DIAGNOSIS — E66.01 MORBID OBESITY WITH BMI OF 45.0-49.9, ADULT (HCC): ICD-10-CM

## 2019-08-27 DIAGNOSIS — E55.9 VITAMIN D DEFICIENCY: ICD-10-CM

## 2019-08-27 RX ORDER — PHENTERMINE HYDROCHLORIDE 37.5 MG/1
37.5 TABLET ORAL
Qty: 30 TAB | Refills: 0 | Status: SHIPPED | OUTPATIENT
Start: 2019-08-27 | End: 2019-10-22 | Stop reason: SDUPTHER

## 2019-08-27 RX ORDER — CYANOCOBALAMIN 1000 UG/ML
1000 INJECTION, SOLUTION INTRAMUSCULAR; SUBCUTANEOUS ONCE
Qty: 1 ML | Refills: 0
Start: 2019-08-27 | End: 2019-08-27

## 2019-08-27 NOTE — PROGRESS NOTES
Analia Garcia  Identified pt with two pt identifiers(name and ). Chief Complaint   Patient presents with    Weight Management     Room 4       1. Have you been to the ER, urgent care clinic since your last visit?n   Hospitalized since your last visit? n    2. Have you seen or consulted any other health care providers outside of the [address] [place] [address] since your last visit? Include any pap smears or colon screening. n      Advance Care Planning    In the event something were to happen to you and you were unable to speak on your behalf, do you have an Advance Directive/ Living Will in place stating your wishes? NO    If yes, do we have a copy on file NO    If no, would you like information NO    Medication reconciliation up to date and corrected with patient at this time. Today's provider has been notified of reason for visit, vitals and flowsheets obtained on patients. Reviewed record in preparation for visit, huddled with provider and have obtained necessary documentation.       Health Maintenance Due   Topic    PAP AKA CERVICAL CYTOLOGY     Influenza Age 5 to Adult        Wt Readings from Last 3 Encounters:   19 284 lb (128.8 kg)   19 295 lb (133.8 kg)   19 291 lb 8 oz (132.2 kg)     Temp Readings from Last 3 Encounters:   19 98.4 °F (36.9 °C) (Oral)   19 98.4 °F (36.9 °C) (Oral)   19 98 °F (36.7 °C) (Oral)     BP Readings from Last 3 Encounters:   19 129/68   19 124/66   19 112/68     Pulse Readings from Last 3 Encounters:   19 95   19 70   19 94     Vitals:    19 1354   BP: 129/68   Pulse: 95   Resp: 19   Temp: 98.4 °F (36.9 °C)   TempSrc: Oral   SpO2: 98%   Weight: 284 lb (128.8 kg)   Height: 5' 4\" (1.626 m)   PainSc:   0 - No pain   LMP: 2019         Learning Assessment:  :     Learning Assessment 2019   PRIMARY LEARNER Patient Patient   HIGHEST LEVEL OF EDUCATION - PRIMARY LEARNER  GRADUATED HIGH SCHOOL OR GED GRADUATED HIGH SCHOOL OR GED   BARRIERS PRIMARY LEARNER NONE -   CO-LEARNER CAREGIVER No -   PRIMARY LANGUAGE ENGLISH ENGLISH   LEARNER PREFERENCE PRIMARY DEMONSTRATION DEMONSTRATION   ANSWERED BY patient patient   RELATIONSHIP SELF SELF       Depression Screening:  :     3 most recent PHQ Screens 8/27/2019   Little interest or pleasure in doing things Not at all   Feeling down, depressed, irritable, or hopeless Not at all   Total Score PHQ 2 0       No flowsheet data found. Fall Risk Assessment:  :     No flowsheet data found. Abuse Screening:  :     Abuse Screening Questionnaire 1/7/2019   Do you ever feel afraid of your partner? N   Are you in a relationship with someone who physically or mentally threatens you? N   Is it safe for you to go home? Y       ADL Screening:  :     ADL Assessment 7/29/2019   Feeding yourself No Help Needed   Getting from bed to chair No Help Needed   Getting dressed No Help Needed   Bathing or showering No Help Needed   Walk across the room (includes cane/walker) No Help Needed   Using the telphone No Help Needed   Taking your medications No Help Needed   Preparing meals No Help Needed   Managing money (expenses/bills) No Help Needed   Moderately strenuous housework (laundry) No Help Needed   Shopping for personal items (toiletries/medicines) No Help Needed   Shopping for groceries No Help Needed   Driving No Help Needed   Climbing a flight of stairs No Help Needed   Getting to places beyond walking distances No Help Needed           BMI:  Weight Metrics 8/27/2019 8/27/2019 7/29/2019 7/29/2019 1/7/2019 10/29/2018 10/16/2017   Weight - 284 lb - 295 lb 291 lb 8 oz 291 lb 291 lb 4 oz   Waist Measure Inches 49 - 51 - - - -   Body Fat % 46.1 - 46.9 - - - -   BMI - 48.75 kg/m2 - 49.85 kg/m2 48.51 kg/m2 48.42 kg/m2 48.47 kg/m2     MMW 35  BWW 41.4  BMR 1968        Medication reconciliation up to date and corrected with patient at this time.

## 2019-08-27 NOTE — PROGRESS NOTES
Chief Complaint   Patient presents with    Weight Management     Room 4     HPI:  The patient is a 27 y.o. female who presents today for a follow up appointment. No hospital, ER or specialist visits since last primary care visit except as noted below. Weight Mgmt:          The patient is a 27y.o. year old obese female who presents today for medical weight loss, her PCP is Joanne Cagle PA-C. Pt's initial weight is 295 pounds with a BMI of 49.85 on 07/29/19. Patient's highest weight was her initial weight. Her weight today is 284 pounds with a BMI of 48.75. She has lost 11 pounds since her last visit. Patient's goal weight is 195 to 210 pounds. The patient's bariatric comorbidities include chronic knee pain, GERD (currently not an issue). The patient's reason for medical weight loss is improving energy and overall health. The patient's quality of life goals are energy to keep up with her kids, no longer feeling \"uncomfortable in her body\". The patient's biggest struggle with losing weight has been: different life situations (losing her parents, staying home with her 5 kids - cooking more food for every kid she has had), she loves to bake. Her brother is currently in a weight management program in [place], [place] and is taking Phentermine. He has done extremely well and she would like to consider this option. The patient's diet typically includes: B: linares, eggs, bread (waffles, Malay toast), L: anything, usually nothing healthy, hot dogs, chicken nuggets, hamburger, S: chips, D: veggies, meat; she finds herself snacking for energy. She is also keeping her brother's baby who does not sleep well so she will snack to stay awake or drink soda. Cut back on sugar intake, decreased sweets and snack food.          The patient is drinking the following: soda, juice (all different types of juice), water (here and there, at least a bottle/day), milk with cereal - does not drink milk by itself, socially drink (twice/week at most - 2 beers, very seldom has liquor). Decreased from multiple sodas daily to 2-3 per week. Increased water and gatorade. The patient's exercise at this time includes: nothing formal, however active with her 5 children. The patient is currently weighing: weighed twice since her last visit. The patient has not tried appetite suppressants in the past.  She has previously tried the following medications: none. She has considered bariatric surgery. She did a [employer] in [id number], she lost down to around 185 - 210 pounds. At that time she was still working and was more active. She was started on Phentermine just after her last visit on 2019. She reports her hunger is well controlled. She denies adverse effects. Postpartum Depression:  After her last baby who is now 3years old. Her mother  2017, then had her son on 2017, then her father passed approximately 11 months after that. She does not remember taking anything for this. Her symptoms are resolved at this time. Labs: Your lab results have been reviewed and are as follows:  Urine Test:  Your urine analysis is normal.    Your vitamin B12 is low. We can either start Vitamin B12 injections at your next visit. These would be given monthly or we can start you on an over the counter Vitamin B12 500mcg daily supplement. Please let me know which option you prefer. Lab Results   Component Value Date/Time    Vitamin B12 233 2019 02:17 PM     Cholesterol Panel: Good! Total Cholesterol is 165 (goal <200). Triglycerides are 75.  (goal <150)  Your HDL or \"good\" cholesterol is 46. (goal >40). Your LDL or \"bad\" cholesterol is 104. (goal <100). CMP:  Fasting blood sugar is normal at 95, your hemoglobin A1C is 5.5. You do not have diabetes.    Kidney function is normal.   Your electrolytes are normal.   Your liver function is normal.     Vitamin D:  Your vitamin D is low at 13.3. This is an important nutrient bone health and to fight inflammation and infection. We will start a prescription to increase your Vitamin D level for the next 3 months. You will take 50,000 international units Vitamin D2 in the form of one tab taken once weekly, I have sent this prescription to your pharmacy on file. After you complete the prescription, start taking OTC Vitamin D3 2,000 international units daily. After three months we will recheck your levels. A normal value is between 40-50 on average. The symptoms of vitamin D deficiency are sometimes vague and can include tiredness and general aches and pains. Some people may not have any symptoms at all. Vitamin D also fights infections, including colds and the flu, as it regulates the expression of genes that influence your immune system to attack and destroy bacteria and viruses. Thyroid:  Your thyroid function is normal.    CBC:  Your red blood cell count is normal.   Your white blood cell count is normal.   Your platelet count is normal.   You are not anemic and your hemoglobin is 13.0. Your Iron levels are normal.  Your Ferritin is normal.    Office Visit on 07/29/2019   Component Date Value Ref Range Status    Specific Gravity 07/29/2019 1.015  1.005 - 1.030 Final    pH (UA) 07/29/2019 6.0  5.0 - 7.5 Final    Color 07/29/2019 Yellow  Yellow Final    Appearance 07/29/2019 Clear  Clear Final    Leukocyte Esterase 07/29/2019 Negative  Negative Final    Protein 07/29/2019 Negative  Negative/Trace Final    Glucose 07/29/2019 Negative  Negative Final    Ketone 07/29/2019 Negative  Negative Final    Blood 07/29/2019 Negative  Negative Final    Bilirubin 07/29/2019 Negative  Negative Final    Urobilinogen 07/29/2019 0.2  0.2 - 1.0 mg/dL Final    Nitrites 07/29/2019 Negative  Negative Final    Microscopic Examination 07/29/2019 Comment   Final    Microscopic not indicated and not performed.     Vitamin B12 07/29/2019 233 232 - 1,245 pg/mL Final    Cholesterol, total 07/29/2019 165  100 - 199 mg/dL Final    Triglyceride 07/29/2019 75  0 - 149 mg/dL Final    HDL Cholesterol 07/29/2019 46  >39 mg/dL Final    VLDL, calculated 07/29/2019 15  5 - 40 mg/dL Final    LDL, calculated 07/29/2019 104* 0 - 99 mg/dL Final    Glucose 07/29/2019 95  65 - 99 mg/dL Final    BUN 07/29/2019 8  6 - 20 mg/dL Final    Creatinine 07/29/2019 0.87  0.57 - 1.00 mg/dL Final    GFR est non-AA 07/29/2019 87  >59 mL/min/1.73 Final    GFR est AA 07/29/2019 100  >59 mL/min/1.73 Final    BUN/Creatinine ratio 07/29/2019 9  9 - 23 Final    Sodium 07/29/2019 140  134 - 144 mmol/L Final    Potassium 07/29/2019 4.5  3.5 - 5.2 mmol/L Final    Chloride 07/29/2019 102  96 - 106 mmol/L Final    CO2 07/29/2019 25  20 - 29 mmol/L Final    Calcium 07/29/2019 9.5  8.7 - 10.2 mg/dL Final    Protein, total 07/29/2019 7.3  6.0 - 8.5 g/dL Final    Albumin 07/29/2019 4.4  3.5 - 5.5 g/dL Final    GLOBULIN, TOTAL 07/29/2019 2.9  1.5 - 4.5 g/dL Final    A-G Ratio 07/29/2019 1.5  1.2 - 2.2 Final    Bilirubin, total 07/29/2019 0.4  0.0 - 1.2 mg/dL Final    Alk. phosphatase 07/29/2019 80  39 - 117 IU/L Final    AST (SGOT) 07/29/2019 12  0 - 40 IU/L Final    ALT (SGPT) 07/29/2019 13  0 - 32 IU/L Final    VITAMIN D, 25-HYDROXY 07/29/2019 13.3* 30.0 - 100.0 ng/mL Final    Comment: Vitamin D deficiency has been defined by the [address] practice guideline as a  level of serum 25-OH vitamin D less than 20 ng/mL (1,2). The Endocrine Society went on to further define vitamin D  insufficiency as a level between 21 and 29 ng/mL (2). 1. IOM (May of Medicine). 2010. Dietary reference     intakes for calcium and D. [facility]: The     [employer].   2. Vitor MOULTON, Zenaida HAM, Tg PRESLEY, et al.     Evaluation, treatment, and prevention of vitamin D     deficiency: an Endocrine Society clinical practice guideline. JCEM. 2011 Jul; 96(9):1911-30.  TSH 07/29/2019 2.460  0.450 - 4.500 uIU/mL Final    Hemoglobin A1c 07/29/2019 5.5  4.8 - 5.6 % Final    Comment:          Prediabetes: 5.7 - 6.4           Diabetes: >6.4           Glycemic control for adults with diabetes: <7.0      Estimated average glucose 07/29/2019 111  mg/dL Final    WBC 07/29/2019 10.3  3.4 - 10.8 x10E3/uL Final    RBC 07/29/2019 4.90  3.77 - 5.28 x10E6/uL Final    HGB 07/29/2019 13.0  11.1 - 15.9 g/dL Final    HCT 07/29/2019 41.3  34.0 - 46.6 % Final    MCV 07/29/2019 84  79 - 97 fL Final    MCH 07/29/2019 26.5* 26.6 - 33.0 pg Final    MCHC 07/29/2019 31.5  31.5 - 35.7 g/dL Final    RDW 07/29/2019 13.0  12.3 - 15.4 % Final    PLATELET 00/84/7162 452  150 - 450 x10E3/uL Final    NEUTROPHILS 07/29/2019 67  Not Estab. % Final    Lymphocytes 07/29/2019 25  Not Estab. % Final    MONOCYTES 07/29/2019 6  Not Estab. % Final    EOSINOPHILS 07/29/2019 2  Not Estab. % Final    BASOPHILS 07/29/2019 0  Not Estab. % Final    ABS. NEUTROPHILS 07/29/2019 6.9  1.4 - 7.0 x10E3/uL Final    Abs Lymphocytes 07/29/2019 2.6  0.7 - 3.1 x10E3/uL Final    ABS. MONOCYTES 07/29/2019 0.6  0.1 - 0.9 x10E3/uL Final    ABS. EOSINOPHILS 07/29/2019 0.2  0.0 - 0.4 x10E3/uL Final    ABS. BASOPHILS 07/29/2019 0.0  0.0 - 0.2 x10E3/uL Final    IMMATURE GRANULOCYTES 07/29/2019 0  Not Estab. % Final    ABS. IMM. GRANS. 07/29/2019 0.0  0.0 - 0.1 x10E3/uL Final    Ferritin 07/29/2019 45  15 - 150 ng/mL Final    TIBC 07/29/2019 293  250 - 450 ug/dL Final    UIBC 07/29/2019 235  131 - 425 ug/dL Final    Iron 07/29/2019 58  27 - 159 ug/dL Final    Iron % saturation 07/29/2019 20  15 - 55 % Final    INTERPRETATION 07/29/2019 Note   Final    Supplemental report is available. Review of Systems  A comprehensive review of systems was negative except for that written in the HPI.     Patient Active Problem List   Diagnosis Code    Knee pain, left M25.562    H/O  section Z98.891    Twins [id number]    Obesity, morbid ([facility] [address] E66.01    H/O sickle cell trait Z86.2    Encounter for weight management Z76.89    Family history of diabetes mellitus Z83.3    Morbid obesity with BMI of 45.0-49.9, adult ([facility]) E66.01, Z68.42    History of anemia Z86.2    Vitamin D deficiency E55.9    Vitamin B12 deficiency E53.8       Past Medical History:   Diagnosis Date    Abnormal Papanicolaou smear of cervix     Leep procedure in     Anemia     \"borderline\" no supplement    Knee pain, left 2013    Postpartum depression 2017    after last baby, who is 2 years at this time       Past Surgical History:   Procedure Laterality Date     DELIVERY ONLY      2009 - boy, 10/18/2013 - girl, 10/6/2014 - twins (girl and boy), 2017 - boy    HX  SECTION  2009    HX DILATION AND CURETTAGE      HX LEEP PROCEDURE         Social History     Tobacco Use    Smoking status: Never Smoker    Smokeless tobacco: Never Used   Substance Use Topics    Alcohol use: No     Comment: socially    Drug use: No       Family History   Problem Relation Age of Onset    Diabetes Mother     Hypertension Mother     Heart Attack Mother     Other Mother          of pancreatitis complications    Diabetes Father     Heart Disease Father     Asthma Brother     Stroke Brother 32        He was incarcerated at the time, possible \"poisoning\" but never had autopsy.  No Known Problems Sister     Cancer Cousin         breast cancer    No Known Problems Sister     No Known Problems Sister        Outpatient Medications Marked as Taking for the 19 encounter (Office Visit) with Shirley Pham NP   Medication Sig Dispense Refill    cyanocobalamin (VITAMIN B-12) 1,000 mcg/mL injection 1 mL by IntraMUSCular route once for 1 dose. 1 mL 0    phentermine (ADIPEX-P) 37.5 mg tablet Take 1 Tab by mouth every morning.  Max Daily Amount: 37.5 mg. 30 Tab 0    ergocalciferol (ERGOCALCIFEROL) 50,000 unit capsule Take 1 Cap by mouth every seven (7) days. 5 Cap 2       Current Outpatient Medications on File Prior to Visit   Medication Sig Dispense Refill    ergocalciferol (ERGOCALCIFEROL) 50,000 unit capsule Take 1 Cap by mouth every seven (7) days. 5 Cap 2     No current facility-administered medications on file prior to visit. No Known Allergies    PE:  Visit Vitals  /68 (BP 1 Location: Right arm, BP Patient Position: Sitting)   Pulse 95   Temp 98.4 °F (36.9 °C) (Oral)   Resp 19   Ht 5' 4\" (1.626 m)   Wt 284 lb (128.8 kg)   LMP 08/07/2019 (Exact Date)   SpO2 98%   BMI 48.75 kg/m²       Gen: alert, oriented, no acute distress  Head: normocephalic, atraumatic  Ears: external auditory canals clear, TMs without erythema or effusion  Eyes: pupils equal round reactive to light, sclera clear, conjunctiva clear  Oral: moist mucus membranes, no oral lesions, no pharyngeal inflammation or exudate  Neck: symmetric normal sized thyroid, no carotid bruits, no jugular vein distention  Resp: no increase work of breathing, lungs clear to ausculation bilaterally, no wheezing, rales or rhonchi  CV: S1, S2 normal.  No murmurs, rubs, or gallops. Abd: soft, not tender, not distended. No hepatosplenomegaly. Normal bowel sounds. No hernias. No abdominal or renal bruits. Neuro: cranial nerves intact, normal strength and movement in all extremities, reflexes and sensation intact and symmetric. Skin: no lesion or rash  Extremities: no cyanosis or edema    No results found for this visit on 08/27/19. Assessment/Plan:    ICD-10-CM ICD-9-CM    1. Vitamin B12 deficiency E53.8 266.2 VITAMIN B12 INJECTION      THER/PROPH/DIAG INJECTION, SUBCUT/IM      cyanocobalamin (VITAMIN B-12) 1,000 mcg/mL injection   2. Obesity, morbid ([facility] [address] E66.01 278.01 phentermine (ADIPEX-P) 37.5 mg tablet   3.  Morbid obesity with BMI of 45.0-49.9, adult ([facility]) E66.01 278.01 phentermine (ADIPEX-P) 37.5 mg tablet    Z68.42 V85.42    4. Encounter for weight management Z76.89 V65.49 phentermine (ADIPEX-P) 37.5 mg tablet   5. Vitamin D deficiency E55.9 268.9      Diagnoses and all orders for this visit:    1. Vitamin B12 deficiency  -     VITAMIN B12 INJECTION  -     THER/PROPH/DIAG INJECTION, SUBCUT/IM  -     cyanocobalamin (VITAMIN B-12) 1,000 mcg/mL injection; 1 mL by IntraMUSCular route once for 1 dose. 2. Obesity, morbid (HCC)  -     phentermine (ADIPEX-P) 37.5 mg tablet; Take 1 Tab by mouth every morning. Max Daily Amount: 37.5 mg.    3. Morbid obesity with BMI of 45.0-49.9, adult (HCC)  -     phentermine (ADIPEX-P) 37.5 mg tablet; Take 1 Tab by mouth every morning. Max Daily Amount: 37.5 mg.    4. Encounter for weight management  -     phentermine (ADIPEX-P) 37.5 mg tablet; Take 1 Tab by mouth every morning. Max Daily Amount: 37.5 mg.    5. Vitamin D deficiency      Follow-up and Dispositions    · Return in about 4 weeks (around 9/24/2019) for Medication Check, Weight Mgmt, Vit B12 Injection, Phent #2 F/U.       lab results and schedule of future lab studies reviewed with patient - recheck Vitamin B12, Vitamin D in 3 months in 11/2019. reviewed diet, exercise and weight control  reviewed medications and side effects in detail    lose weight, increase physical activity, continue present plan, call if any problems    Health Maintenance reviewed - deferred to PCP. Recommended healthy diet low in carbohydrates, fats, sodium and cholesterol. Recommended regular cardiovascular exercise 3-6 times per week for 30-60 minutes daily. Chart is reviewed and updated today in the office. Records requested for other providers patient has seen and is currently seeing. Patient was offered a choice/choices in the treatment plan today. Patient expresses understanding of the plan and agrees with recommendations. Verbal and written instructions (see AVS) provided. See patient instructions for more.  Patient expresses understanding of diagnosis and treatment plan.

## 2019-10-18 ENCOUNTER — TELEPHONE (OUTPATIENT)
Dept: FAMILY MEDICINE CLINIC | Age: 34
End: 2019-10-18

## 2019-10-18 NOTE — TELEPHONE ENCOUNTER
Writer returned call to pharmacist. She wanted to know if provider still wanted patient to get Vit D3 50,000 units filled since patient recently got Vit D 2 filled. Patient insurance paid for it even though you can obtain this OTC. Please advise.

## 2019-10-21 NOTE — TELEPHONE ENCOUNTER
Writer updated pharmacist that per provider patient will be taking VIT D2. Pharmacist verbalized understanding. Nothing further.

## 2019-10-22 ENCOUNTER — OFFICE VISIT (OUTPATIENT)
Dept: FAMILY MEDICINE CLINIC | Age: 34
End: 2019-10-22

## 2019-10-22 VITALS
RESPIRATION RATE: 20 BRPM | WEIGHT: 267.4 LBS | HEIGHT: 64 IN | TEMPERATURE: 98.3 F | BODY MASS INDEX: 45.65 KG/M2 | DIASTOLIC BLOOD PRESSURE: 63 MMHG | SYSTOLIC BLOOD PRESSURE: 118 MMHG | OXYGEN SATURATION: 98 % | HEART RATE: 78 BPM

## 2019-10-22 DIAGNOSIS — Z23 ENCOUNTER FOR IMMUNIZATION: ICD-10-CM

## 2019-10-22 DIAGNOSIS — E66.01 MORBID OBESITY WITH BMI OF 45.0-49.9, ADULT (HCC): ICD-10-CM

## 2019-10-22 DIAGNOSIS — E66.01 OBESITY, MORBID (HCC): ICD-10-CM

## 2019-10-22 DIAGNOSIS — E53.8 VITAMIN B12 DEFICIENCY: Primary | ICD-10-CM

## 2019-10-22 DIAGNOSIS — R63.4 WEIGHT LOSS: ICD-10-CM

## 2019-10-22 DIAGNOSIS — E55.9 VITAMIN D DEFICIENCY: ICD-10-CM

## 2019-10-22 DIAGNOSIS — Z76.89 ENCOUNTER FOR WEIGHT MANAGEMENT: ICD-10-CM

## 2019-10-22 RX ORDER — CYANOCOBALAMIN 1000 UG/ML
1000 INJECTION, SOLUTION INTRAMUSCULAR; SUBCUTANEOUS ONCE
Qty: 1 ML | Refills: 0
Start: 2019-10-22 | End: 2019-10-22 | Stop reason: RX

## 2019-10-22 RX ORDER — ASPIRIN 325 MG
TABLET, DELAYED RELEASE (ENTERIC COATED) ORAL
COMMUNITY
Start: 2019-09-11 | End: 2019-10-22 | Stop reason: ALTCHOICE

## 2019-10-22 RX ORDER — PHENTERMINE HYDROCHLORIDE 37.5 MG/1
37.5 TABLET ORAL
Qty: 30 TAB | Refills: 0 | Status: SHIPPED | OUTPATIENT
Start: 2019-10-22 | End: 2019-12-04 | Stop reason: SDUPTHER

## 2019-10-22 NOTE — PROGRESS NOTES
Immunization/s administered on 10/22/2019 by Dee Mckinney per Anup Moreno PA-C with patients consent signed. Patient tolerated procedure well. No reactions noted.

## 2019-10-22 NOTE — PROGRESS NOTES
Robert Mixon  Identified pt with two pt identifiers(name and ). Chief Complaint   Patient presents with    Weight Management     Room 7       1. Have you been to the ER, urgent care clinic since your last visit? n  Hospitalized since your last visit? n     2. Have you seen or consulted any other health care providers outside of the [address] [place] [address] since your last visit? Include any pap smears or colon screening. n       Advance Care Planning    In the event something were to happen to you and you were unable to speak on your behalf, do you have an Advance Directive/ Living Will in place stating your wishes? NO    If yes, do we have a copy on file NO    If no, would you like information NO    Medication reconciliation up to date and corrected with patient at this time. Today's provider has been notified of reason for visit, vitals and flowsheets obtained on patients. Reviewed record in preparation for visit, huddled with provider and have obtained necessary documentation.       Health Maintenance Due   Topic    PAP AKA CERVICAL CYTOLOGY     Influenza Age 5 to Adult        Wt Readings from Last 3 Encounters:   10/22/19 267 lb 6.4 oz (121.3 kg)   19 284 lb (128.8 kg)   19 295 lb (133.8 kg)     Temp Readings from Last 3 Encounters:   10/22/19 98.3 °F (36.8 °C) (Oral)   19 98.4 °F (36.9 °C) (Oral)   19 98.4 °F (36.9 °C) (Oral)     BP Readings from Last 3 Encounters:   10/22/19 118/63   19 129/68   19 124/66     Pulse Readings from Last 3 Encounters:   10/22/19 78   19 95   19 70     Vitals:    10/22/19 1043   BP: 118/63   Pulse: 78   Resp: 20   Temp: 98.3 °F (36.8 °C)   TempSrc: Oral   SpO2: 98%   Weight: 267 lb 6.4 oz (121.3 kg)   Height: 5' 4\" (1.626 m)   PainSc:   0 - No pain   LMP: 10/05/2019         Learning Assessment:  :     Learning Assessment 2019   PRIMARY LEARNER Patient Patient   HIGHEST LEVEL OF EDUCATION - PRIMARY LEARNER  GRADUATED HIGH SCHOOL OR GED GRADUATED HIGH SCHOOL OR GED   BARRIERS PRIMARY LEARNER NONE -   CO-LEARNER CAREGIVER No -   PRIMARY LANGUAGE ENGLISH ENGLISH   LEARNER PREFERENCE PRIMARY DEMONSTRATION DEMONSTRATION   ANSWERED BY patient patient   RELATIONSHIP SELF SELF       Depression Screening:  :     3 most recent PHQ Screens 10/22/2019   Little interest or pleasure in doing things Not at all   Feeling down, depressed, irritable, or hopeless Not at all   Total Score PHQ 2 0       No flowsheet data found. Fall Risk Assessment:  :     No flowsheet data found. Abuse Screening:  :     Abuse Screening Questionnaire 1/7/2019   Do you ever feel afraid of your partner? N   Are you in a relationship with someone who physically or mentally threatens you? N   Is it safe for you to go home? Y       ADL Screening:  :     ADL Assessment 7/29/2019   Feeding yourself No Help Needed   Getting from bed to chair No Help Needed   Getting dressed No Help Needed   Bathing or showering No Help Needed   Walk across the room (includes cane/walker) No Help Needed   Using the telphone No Help Needed   Taking your medications No Help Needed   Preparing meals No Help Needed   Managing money (expenses/bills) No Help Needed   Moderately strenuous housework (laundry) No Help Needed   Shopping for personal items (toiletries/medicines) No Help Needed   Shopping for groceries No Help Needed   Driving No Help Needed   Climbing a flight of stairs No Help Needed   Getting to places beyond walking distances No Help Needed           BMI:  Weight Metrics 10/22/2019 10/22/2019 8/27/2019 8/27/2019 7/29/2019 7/29/2019 1/7/2019   Weight - 267 lb 6.4 oz - 284 lb - 295 lb 291 lb 8 oz   Waist Measure Inches 48 - 49 - 51 - -   Body Fat % 46.1 - 46.1 - 46.9 - -   BMI - 45.9 kg/m2 - 48.75 kg/m2 - 49.85 kg/m2 48.51 kg/m2           Medication reconciliation up to date and corrected with patient at this time.

## 2019-10-22 NOTE — PATIENT INSTRUCTIONS
Vaccine Information Statement    Influenza (Flu) Vaccine (Inactivated or Recombinant): What You Need to Know    Many Vaccine Information Statements are available in [place] and other languages. See www.immunize.org/vis  Hojas de información sobre vacunas están disponibles en español y en muchos otros idiomas. Visite www.immunize.org/vis    1. Why get vaccinated? Influenza vaccine can prevent influenza (flu). Flu is a contagious disease that spreads around the [facility] every year, usually between October and May. Anyone can get the flu, but it is more dangerous for some people. Infants and young children, people 72years of age and older, pregnant women, and people with certain health conditions or a weakened immune system are at greatest risk of flu complications. Pneumonia, bronchitis, sinus infections and ear infections are examples of flu-related complications. If you have a medical condition, such as heart disease, cancer or diabetes, flu can make it worse. Flu can cause fever and chills, sore throat, muscle aches, fatigue, cough, headache, and runny or stuffy nose. Some people may have vomiting and diarrhea, though this is more common in children than adults. Each year thousands of people in the [facility] die from flu, and many more are hospitalized. Flu vaccine prevents millions of illnesses and flu-related visits to the doctor each year. 2. Influenza vaccines     CDC recommends everyone 10months of age and older get vaccinated every flu season. Children 6 months through 6years of age may need 2 doses during a single flu season. Everyone else needs only 1 dose each flu season. It takes about 2 weeks for protection to develop after vaccination. There are many flu viruses, and they are always changing. Each year a new flu vaccine is made to protect against three or four viruses that are likely to cause disease in the upcoming flu season.  Even when the vaccine doesnt exactly match these viruses, it may still provide some protection. Influenza vaccine does not cause flu. Influenza vaccine may be given at the same time as other vaccines. 3. Talk with your health care provider    Tell your vaccine provider if the person getting the vaccine:   Has had an allergic reaction after a previous dose of influenza vaccine, or has any severe, life-threatening allergies.  Has ever had Guillain-Barré Syndrome (also called GBS). In some cases, your health care provider may decide to postpone influenza vaccination to a future visit. People with minor illnesses, such as a cold, may be vaccinated. People who are moderately or severely ill should usually wait until they recover before getting influenza vaccine. Your health care provider can give you more information. 4. Risks of a reaction     Soreness, redness, and swelling where shot is given, fever, muscle aches, and headache can happen after influenza vaccine.  There may be a very small increased risk of Guillain-Barré Syndrome (GBS) after inactivated influenza vaccine (the flu shot). Teresa Piña children who get the flu shot along with pneumococcal vaccine (PCV13), and/or DTaP vaccine at the same time might be slightly more likely to have a seizure caused by fever. Tell your health care provider if a child who is getting flu vaccine has ever had a seizure. People sometimes faint after medical procedures, including vaccination. Tell your provider if you feel dizzy or have vision changes or ringing in the ears. As with any medicine, there is a very remote chance of a vaccine causing a severe allergic reaction, other serious injury, or death. 5. What if there is a serious problem? An allergic reaction could occur after the vaccinated person leaves the clinic.  If you see signs of a severe allergic reaction (hives, swelling of the face and throat, difficulty breathing, a fast heartbeat, dizziness, or weakness), call 9-1-1 and get the person to the nearest hospital.    For other signs that concern you, call your health care provider. Adverse reactions should be reported to the Vaccine Adverse Event Reporting System (VAERS). Your health care provider will usually file this report, or you can do it yourself. Visit the VAERS website at www.vaers. [facility].gov or call 5-527.231.1707. VAERS is only for reporting reactions, and VAERS staff do not give medical advice. 6. The National Vaccine Injury Compensation Program    The [facility] Vaccine Injury Compensation Program (VICP) is a federal program that was created to compensate people who may have been injured by certain vaccines. Visit the VICP website at www.[facility].gov/vaccinecompensation or call 1-214.408.5759 to learn about the program and about filing a claim. There is a time limit to file a claim for compensation. 7. How can I learn more?  Ask your health care provider.  Call your local or state health department.  Contact the Centers for Disease Control and Prevention (CDC):  - Call 4-394.581.9907 (7-177-YWP-INFO) or  - Visit CDCs influenza website at www.cdc.gov/flu    Vaccine Information Statement (Interim)  Inactivated Influenza Vaccine   8/15/2019  [address]   Department of Health and Human Services  Centers for Disease Control and Prevention    Office Use Only         Learning About Low-Carbohydrate Diets for Weight Loss  What is a low-carbohydrate diet? Low-carb diets avoid foods that are high in carbohydrate. These high-carb foods include pasta, bread, rice, cereal, fruits, and starchy vegetables. Instead, these diets usually have you eat foods that are high in fat and protein. Many people lose weight quickly on a low-carb diet. But the early weight loss is water. People on this diet often gain the weight back after they start eating carbs again. Not all diet plans are safe or work well.  A lot of the evidence shows that low-carb diets aren't healthy. That's because these diets often don't include healthy foods like fruits and vegetables. Losing weight safely means balancing protein, fat, and carbs with every meal and snack. And low-carb diets don't always provide the vitamins, minerals, and fiber you need. If you have a serious medical condition, talk to your doctor before you try any diet. These conditions include kidney disease, heart disease, type 2 diabetes, high cholesterol, and high blood pressure. If you are pregnant, it may not be safe for your baby if you are on a low-carb diet. How can you lose weight safely? You might have heard that a diet plan helped another person lose weight. But that doesn't mean that it will work for you. It is very hard to stay on a diet that includes lots of big changes in your eating habits. If you want to get to a healthy weight and stay there, making healthy lifestyle changes will often work better than dieting. These steps can help. · Make a plan for change. Work with your doctor to create a plan that is right for you. · See a dietitian. He or she can show you how to make healthy changes in your eating habits. · Manage stress. If you have a lot of stress in your life, it can be hard to focus on making healthy changes to your daily habits. · Track your food and activity. You are likely to do better at losing weight if you keep track of what you eat and what you do. Follow-up care is a key part of your treatment and safety. Be sure to make and go to all appointments, and call your doctor if you are having problems. It's also a good idea to know your test results and keep a list of the medicines you take. Where can you learn more? Go to http://darrell-saman.info/. Enter A121 in the search box to learn more about \"Learning About Low-Carbohydrate Diets for Weight Loss. \"  Current as of: November 7, 2018  Content Version: 12.2  © 3952-2867 [employer].  Care instructions adapted under license by [employer] (which disclaims liability or warranty for this information). If you have questions about a medical condition or this instruction, always ask your healthcare professional. [address] any warranty or liability for your use of this information. Learning About Vitamin D  Why is it important to get enough vitamin D? Your body needs vitamin D to absorb calcium. Calcium keeps your bones and muscles, including your heart, healthy and strong. If your muscles don't get enough calcium, they can cramp, hurt, or feel weak. You may have long-term (chronic) muscle aches and pains. If you don't get enough vitamin D throughout life, you have an increased chance of having thin and brittle bones (osteoporosis) in your later years. Children who don't get enough vitamin D may not grow as much as others their age. They also have a chance of getting a rare disease called rickets. It causes weak bones. Vitamin D and calcium are added to many foods. And your body uses sunshine to make its own vitamin D. How much vitamin D do you need? The [place] of Medicine recommends that people ages 3 through 79 get 600 IU (international units) every day. Adults 71 and older need 800 IU every day. Blood tests for vitamin D can check your vitamin D level. But there is no standard normal range used by all laboratories. You're likely getting enough vitamin D if your levels are in the range of 20 to 50 ng/mL. How can you get more vitamin D? Foods that contain vitamin D include:  · [place], tuna, and mackerel. These are some of the best foods to eat when you need to get more vitamin D.  · Cheese, egg yolks, and beef liver. These foods have vitamin D in small amounts. · Milk, soy drinks, orange juice, yogurt, margarine, and some kinds of cereal have vitamin D added to them. Some people don't make vitamin D as well as others.  They may have to take extra care in getting enough vitamin D. Things that reduce how much vitamin D your body makes include:  · Dark skin, such as many  Americans have. · Age, especially if you are older than 72. · Digestive problems, such as Crohn's or celiac disease. · Liver and kidney disease. Some people who do not get enough vitamin D may need supplements. Are there any risks from taking vitamin D?  · Too much vitamin D:  ? Can damage your kidneys. ? Can cause nausea and vomiting, constipation, and weakness. ? Raises the amount of calcium in your blood. If this happens, you can get confused or have an irregular heart rhythm. · Vitamin D may interact with other medicines. Tell your doctor about all of the medicines you take, including over-the-counter drugs, herbs, and pills. Tell your doctor about all of your current medical problems. Where can you learn more? Go to http://darrell-saman.info/. Enter [id number] in the search box to learn more about \"Learning About Vitamin D.\"  Current as of: November 7, 2018  Content Version: 12.2  © 3611-7448 [employer]. Care instructions adapted under license by [employer] (which disclaims liability or warranty for this information). If you have questions about a medical condition or this instruction, always ask your healthcare professional. [address] any warranty or liability for your use of this information.

## 2019-10-22 NOTE — TELEPHONE ENCOUNTER
Writer called into patient patient pharmacy via verbal order:    Phentermine 37.5 mg  1 po Every morning  Dispense 30- 0 Refills    Patient aware. Pharmacist verbalized understanding

## 2019-10-22 NOTE — PROGRESS NOTES
Chief Complaint   Patient presents with    Weight Management     Room 7         HPI:  The patient is a 27 y.o. female who presents today for a follow up appointment. No hospital, ER or specialist visits since last primary care visit except as noted below. Weight Mgmt:          The patient is a 27y.o. year old obese female who presents today for medical weight loss, her PCP is Joanne Cagle PA-C. Pt's initial weight is 295 pounds with a BMI of 49.85 on 07/29/19. Patient's highest weight was her initial weight. Her weight today is 284 pounds with a BMI of 48.75. She has lost 17 pounds since her last visit. She has lost 28 pounds since her initial weight loss visit. Patient's goal weight is 195 to 210 pounds. The patient's bariatric comorbidities include chronic knee pain, GERD (currently not an issue). The patient's reason for medical weight loss is improving energy and overall health. The patient's quality of life goals are energy to keep up with her kids, no longer feeling \"uncomfortable in her body\". The patient's biggest struggle with losing weight has been: different life situations (losing her parents, staying home with her 5 kids - cooking more food for every kid she has had), she loves to bake. Her brother is currently in a weight management program in [place], [place] and is taking Phentermine. He has done extremely well and she would like to consider this option. The patient's diet typically includes: B: linares, eggs, bread (waffles, Slovak toast), L: anything, usually nothing healthy, hot dogs, chicken nuggets, hamburger, S: chips, D: veggies, meat; she finds herself snacking for energy. She is also keeping her brother's baby who does not sleep well so she will snack to stay awake or drink soda. Cut back on sugar intake, decreased sweets and snack food.          The patient is drinking the following: soda, juice (all different types of juice), water (here and there, at least a bottle/day), milk with cereal - does not drink milk by itself, socially drink (twice/week at most - 2 beers, very seldom has liquor). Decreased from multiple sodas daily to 2-3 per week. Increased water and gatorade. The patient's exercise at this time includes: nothing formal, however active with her 5 children. The patient is currently weighing: weighed twice since her last visit. The patient has not tried appetite suppressants in the past.  She has previously tried the following medications: none. She has considered bariatric surgery. She did a [employer] in [id number], she lost down to around 185 - 210 pounds. At that time she was still working and was more active. She was started on Phentermine just after her last visit on 2019. She reports her hunger is well controlled. She denies adverse effects. She ran out of Phentermine approximately 1 month ago. Weight Metrics 10/22/2019 10/22/2019 2019 2019 2019 2019 2019   Weight - 267 lb 6.4 oz - 284 lb - 295 lb 291 lb 8 oz   Waist Measure Inches 48 - 49 - 51 - -   Body Fat % 46.1 - 46.1 - 46.9 - -   BMI - 45.9 kg/m2 - 48.75 kg/m2 - 49.85 kg/m2 48.51 kg/m2     Postpartum Depression:  After her last baby who is now 3years old. Her mother  2017, then had her son on 2017, then her father passed approximately 11 months after that. She does not remember taking anything for this. Her symptoms are resolved at this time. Labs: Your lab results have been reviewed and are as follows:  Urine Test:  Your urine analysis is normal.    Your vitamin B12 is low. We can either start Vitamin B12 injections at your next visit. These would be given monthly or we can start you on an over the counter Vitamin B12 500mcg daily supplement. Please let me know which option you prefer. Review of Systems  A comprehensive review of systems was negative except for that written in the HPI.     Patient Active Problem List   Diagnosis Code    Knee pain, left M18.18    H/O  section Z98.891    Twins [id number]    Obesity, morbid ([facility] [address] E66.01    H/O sickle cell trait Z86.2    Encounter for weight management Z76.89    Family history of diabetes mellitus Z83.3    Morbid obesity with BMI of 45.0-49.9, adult ([facility] [address] E66.01, Z68.42    History of anemia Z86.2    Vitamin D deficiency E55.9    Vitamin B12 deficiency E53.8    Weight loss R63.4       Past Medical History:   Diagnosis Date    Abnormal Papanicolaou smear of cervix     Leep procedure in     Anemia     \"borderline\" no supplement    Knee pain, left 2013    Postpartum depression 2017    after last baby, who is 2 years at this time       Past Surgical History:   Procedure Laterality Date     DELIVERY ONLY      2009 - boy, 10/18/2013 - girl, 10/6/2014 - twins (girl and boy), 2017 - boy    HX  SECTION  2009    HX DILATION AND CURETTAGE      HX LEEP PROCEDURE         Social History     Tobacco Use    Smoking status: Never Smoker    Smokeless tobacco: Never Used   Substance Use Topics    Alcohol use: No     Comment: socially    Drug use: No       Family History   Problem Relation Age of Onset    Diabetes Mother     Hypertension Mother     Heart Attack Mother     Other Mother          of pancreatitis complications    Diabetes Father     Heart Disease Father     Asthma Brother     Stroke Brother 32        He was incarcerated at the time, possible \"poisoning\" but never had autopsy.  No Known Problems Sister     Cancer Cousin         breast cancer    No Known Problems Sister     No Known Problems Sister            Current Outpatient Medications on File Prior to Visit   Medication Sig Dispense Refill    ergocalciferol (ERGOCALCIFEROL) 50,000 unit capsule Take 1 Cap by mouth every seven (7) days.  5 Cap 2    [DISCONTINUED] cholecalciferol (VITAMIN D3) 50,000 unit capsule       [DISCONTINUED] phentermine (ADIPEX-P) 37.5 mg tablet Take 1 Tab by mouth every morning. Max Daily Amount: 37.5 mg. 30 Tab 0     No current facility-administered medications on file prior to visit. No Known Allergies    PE:  Visit Vitals  /63 (BP 1 Location: Right arm, BP Patient Position: Sitting)   Pulse 78   Temp 98.3 °F (36.8 °C) (Oral)   Resp 20   Ht 5' 4\" (1.626 m)   Wt 267 lb 6.4 oz (121.3 kg)   LMP 10/05/2019 (Exact Date)   SpO2 98%   BMI 45.90 kg/m²       Gen: alert, oriented, no acute distress  Head: normocephalic, atraumatic  Ears: external auditory canals clear, TMs without erythema or effusion  Eyes: pupils equal round reactive to light, sclera clear, conjunctiva clear  Oral: moist mucus membranes, no oral lesions, no pharyngeal inflammation or exudate  Neck: symmetric normal sized thyroid, no carotid bruits, no jugular vein distention  Resp: no increase work of breathing, lungs clear to ausculation bilaterally, no wheezing, rales or rhonchi  CV: S1, S2 normal.  No murmurs, rubs, or gallops. Abd: soft, not tender, not distended. No hepatosplenomegaly. Normal bowel sounds. No hernias. No abdominal or renal bruits. Neuro: cranial nerves intact, normal strength and movement in all extremities, reflexes and sensation intact and symmetric. Skin: no lesion or rash  Extremities: no cyanosis or edema    No results found for this visit on 10/22/19. Assessment/Plan:    ICD-10-CM ICD-9-CM    1. Vitamin B12 deficiency E53.8 266.2 DISCONTINUED: cyanocobalamin (VITAMIN B-12) 1,000 mcg/mL injection      CANCELED: VITAMIN B12 INJECTION      CANCELED: THER/PROPH/DIAG INJECTION, SUBCUT/IM   2. Obesity, morbid ([facility]) E66.01 278.01 phentermine (ADIPEX-P) 37.5 mg tablet   3. Encounter for weight management Z76.89 V65.49 phentermine (ADIPEX-P) 37.5 mg tablet   4. Morbid obesity with BMI of 45.0-49.9, adult ([facility]) E66.01 278.01 phentermine (ADIPEX-P) 37.5 mg tablet    Z68.42 V85.42    5.  Weight loss R63.4 783.21    6. Encounter for immunization Z23 V03.89 INFLUENZA VIRUS VAC QUAD,SPLIT,PRESV FREE SYRINGE IM      MA IMMUNIZ ADMIN,1 SINGLE/COMB VAC/TOXOID   7. Vitamin D deficiency E55.9 268.9      Diagnoses and all orders for this visit:    1. Vitamin B12 deficiency    2. Obesity, morbid (HCC)  -     phentermine (ADIPEX-P) 37.5 mg tablet; Take 1 Tab by mouth every morning. Max Daily Amount: 37.5 mg.    3. Encounter for weight management  -     phentermine (ADIPEX-P) 37.5 mg tablet; Take 1 Tab by mouth every morning. Max Daily Amount: 37.5 mg.    4. Morbid obesity with BMI of 45.0-49.9, adult (HCC)  -     phentermine (ADIPEX-P) 37.5 mg tablet; Take 1 Tab by mouth every morning. Max Daily Amount: 37.5 mg.    5. Weight loss    6. Encounter for immunization  -     INFLUENZA VIRUS VAC QUAD,SPLIT,PRESV FREE SYRINGE IM  -     MA IMMUNIZ ADMIN,1 SINGLE/COMB VAC/TOXOID    7. Vitamin D deficiency      Follow-up and Dispositions    · Return in about 4 weeks (around 11/19/2019) for Medication Check, Weight Mgmt, Vit B12 Injection, Labs.       lab results and schedule of future lab studies reviewed with patient - recheck Vitamin B12, Vitamin D in 3 months in 11/2019. reviewed diet, exercise and weight control  reviewed medications and side effects in detail - Practice is out of Vitamin B12 at this time, will need to restart Vitamin B12 injections at her next visit. lose weight, increase physical activity, continue present plan, call if any problems    Health Maintenance reviewed - reviewed, flu vaccine given in the office today, otherwise deferred to PCP. Recommended healthy diet low in carbohydrates, fats, sodium and cholesterol. Recommended regular cardiovascular exercise 3-6 times per week for 30-60 minutes daily. Chart is reviewed and updated today in the office. Records requested for other providers patient has seen and is currently seeing. Patient was offered a choice/choices in the treatment plan today. Patient expresses understanding of the plan and agrees with recommendations. Verbal and written instructions (see AVS) provided. See patient instructions for more. Patient expresses understanding of diagnosis and treatment plan.

## 2019-12-04 ENCOUNTER — OFFICE VISIT (OUTPATIENT)
Dept: FAMILY MEDICINE CLINIC | Age: 34
End: 2019-12-04

## 2019-12-04 VITALS
WEIGHT: 267.8 LBS | DIASTOLIC BLOOD PRESSURE: 70 MMHG | HEIGHT: 64 IN | HEART RATE: 80 BPM | RESPIRATION RATE: 18 BRPM | BODY MASS INDEX: 45.72 KG/M2 | OXYGEN SATURATION: 99 % | TEMPERATURE: 97.3 F | SYSTOLIC BLOOD PRESSURE: 119 MMHG

## 2019-12-04 DIAGNOSIS — E66.01 MORBID OBESITY WITH BMI OF 45.0-49.9, ADULT (HCC): ICD-10-CM

## 2019-12-04 DIAGNOSIS — E53.8 VITAMIN B12 DEFICIENCY: Primary | ICD-10-CM

## 2019-12-04 DIAGNOSIS — Z76.89 ENCOUNTER FOR WEIGHT MANAGEMENT: ICD-10-CM

## 2019-12-04 DIAGNOSIS — R63.4 WEIGHT LOSS: ICD-10-CM

## 2019-12-04 DIAGNOSIS — E55.9 VITAMIN D DEFICIENCY: ICD-10-CM

## 2019-12-04 DIAGNOSIS — E66.01 OBESITY, MORBID (HCC): ICD-10-CM

## 2019-12-04 RX ORDER — ERGOCALCIFEROL 1.25 MG/1
50000 CAPSULE ORAL
Qty: 5 CAP | Refills: 2 | Status: SHIPPED | OUTPATIENT
Start: 2019-12-04 | End: 2022-03-02

## 2019-12-04 RX ORDER — CYANOCOBALAMIN 1000 UG/ML
1000 INJECTION, SOLUTION INTRAMUSCULAR; SUBCUTANEOUS ONCE
Qty: 1 ML | Refills: 0
Start: 2019-12-04 | End: 2019-12-04

## 2019-12-04 RX ORDER — PHENTERMINE HYDROCHLORIDE 37.5 MG/1
37.5 TABLET ORAL
Qty: 30 TAB | Refills: 0 | Status: SHIPPED | OUTPATIENT
Start: 2019-12-04 | End: 2021-09-22 | Stop reason: ALTCHOICE

## 2019-12-04 NOTE — PROGRESS NOTES
Room 4    Identified pt with two pt identifiers(name and ). Reviewed record in preparation for visit and have obtained necessary documentation. All patient medications has been reviewed. Chief Complaint   Patient presents with    Weight Management     1 month f/u     Weight Management  45.5% body fat  44.2 BMI  41.7% water  1902 resting  16.3 in  50.5in    Health Maintenance Due   Topic    PAP AKA CERVICAL CYTOLOGY        Vitals:    19 1055   BP: 119/70   Pulse: 80   Resp: 18   Temp: 97.3 °F (36.3 °C)   TempSrc: Oral   SpO2: 99%   Weight: 267 lb 12.8 oz (121.5 kg)   Height: 5' 3.78\" (1.62 m)   PainSc:   0 - No pain   LMP: 2019       Wt Readings from Last 3 Encounters:   19 267 lb 12.8 oz (121.5 kg)   10/22/19 267 lb 6.4 oz (121.3 kg)   19 284 lb (128.8 kg)     Temp Readings from Last 3 Encounters:   19 97.3 °F (36.3 °C) (Oral)   10/22/19 98.3 °F (36.8 °C) (Oral)   19 98.4 °F (36.9 °C) (Oral)     BP Readings from Last 3 Encounters:   19 119/70   10/22/19 118/63   19 129/68     Pulse Readings from Last 3 Encounters:   19 80   10/22/19 78   19 95       Lab Results   Component Value Date/Time    Hemoglobin A1c 5.5 2019 02:17 PM       Coordination of Care Questionnaire:   1) Have you been to an emergency room, urgent care, or hospitalized since your last visit?   no       2. Have seen or consulted any other health care provider since your last visit? NO    3) Do you have an Advanced Directive/ Living Will in place? YES  If yes, do we have a copy on file YES  If no, would you like information NO    Patient is accompanied by self I have received verbal consent from [address] [place] [address] to discuss any/all medical information while they are present in the room.

## 2019-12-04 NOTE — PROGRESS NOTES
Written order received to administer 1000 mcg/mL of Cyanocobalamin. After obtaining verbal consent from the patient, B12 injection was administered to left gluteus by TRICIA Pruett 46: [id number], [id number] Exp: 02/01/2021 Manf: Marina Pierre. Patient tolerated procedure well.

## 2019-12-04 NOTE — PROGRESS NOTES
Chief Complaint   Patient presents with    Weight Management     1 month f/u         HPI:  The patient is a 27 y.o. female who presents today for a follow up appointment. No hospital, ER or specialist visits since last primary care visit except as noted below. Weight Mgmt:          The patient is a 27y.o. year old obese female who presents today for medical weight loss, her PCP is Joanne Cagle PA-C. Pt's initial weight is 295 pounds with a BMI of 49.85 on 07/29/19. Patient's highest weight was her initial weight. Her weight today is 267 pounds with a BMI of 46.29. She has lost 0 pounds since her last visit. She has lost 28 pounds since her initial weight loss visit. Patient's goal weight is 195 to 210 pounds. The patient's bariatric comorbidities include chronic knee pain, GERD (currently not an issue). The patient's reason for medical weight loss is improving energy and overall health. The patient's quality of life goals are energy to keep up with her kids, no longer feeling \"uncomfortable in her body\". The patient's biggest struggle with losing weight has been: different life situations (losing her parents, staying home with her 5 kids - cooking more food for every kid she has had), she loves to bake. Her brother is currently in a weight management program in [address] [place] [address] and is taking Phentermine. He has done extremely well and she would like to consider this option. The patient's diet typically includes: B: linares, eggs, bread (waffles, Armenian toast), L: anything, usually nothing healthy, hot dogs, chicken nuggets, hamburger, S: chips, D: veggies, meat; she finds herself snacking for energy. She is also keeping her brother's baby who does not sleep well so she will snack to stay awake or drink soda. Cut back on sugar intake, decreased sweets and snack food.          The patient is drinking the following: soda, juice (all different types of juice), water (here and there, at least a bottle/day), milk with cereal - does not drink milk by itself, socially drink (twice/week at most - 2 beers, very seldom has liquor). Decreased from multiple sodas daily to 2-3 per week. Increased water and gatorade. The patient's exercise at this time includes: nothing formal, however active with her 5 children. The patient is currently weighing: weighed twice since her last visit. The patient has not tried appetite suppressants in the past.  She has previously tried the following medications: none. She has considered bariatric surgery. She did a [employer] in [id number], she lost down to around 185 - 210 pounds. At that time she was still working and was more active. She was started on Phentermine just after her last visit on 7/29/2019. She reports her hunger is well controlled. She denies adverse effects. She ran out of Phentermine approximately 1 month. She was restarted on Phentermine on 10/22/2019 visit, she reports it is working well for her, her hunger is well controlled. Denies adverse effects. Weight Metrics 12/4/2019 12/4/2019 10/22/2019 10/22/2019 8/27/2019 8/27/2019 7/29/2019   Weight - 267 lb 12.8 oz - 267 lb 6.4 oz - 284 lb -   Neck Circ (inches) 16.3 - - - - - -   Waist Measure Inches 50.5 - 48 - 49 - 51   Body Fat % 45.5 - 46.1 - 46.1 - 46.9   BMI - 46.29 kg/m2 - 45.9 kg/m2 - 48.75 kg/m2 -     Vitamin D Def:  Patient was taking weekly Vitamin D until 2 weeks ago when she ran out, needs refill today. Lab Results   Component Value Date/Time    VITAMIN D, 25-HYDROXY 13.3 (L) 07/29/2019 02:17 PM       Vitamin B12 Def:  Needs to start supplementation today in the office, will have a Vitamin B12 injection today. Lab Results   Component Value Date/Time    Vitamin B12 233 07/29/2019 02:17 PM     Review of Systems  A comprehensive review of systems was negative except for that written in the HPI.     Patient Active Problem List   Diagnosis Code    Knee pain, left M18.18    H/O  section Z98.891    Twins [id number]    Obesity, morbid ([facility] E66.01    H/O sickle cell trait Z86.2    Encounter for weight management Z76.89    Family history of diabetes mellitus Z83.3    Morbid obesity with BMI of 45.0-49.9, adult ([facility] E66.01, Z68.42    History of anemia Z86.2    Vitamin D deficiency E55.9    Vitamin B12 deficiency E53.8    Weight loss R63.4       Past Medical History:   Diagnosis Date    Abnormal Papanicolaou smear of cervix     Leep procedure in     Anemia     \"borderline\" no supplement    Knee pain, left 2013    Postpartum depression 2017    after last baby, who is 2 years at this time       Past Surgical History:   Procedure Laterality Date     DELIVERY ONLY      2009 - boy, 10/18/2013 - girl, 10/6/2014 - twins (girl and boy), 2017 - boy    HX  SECTION  2009    HX DILATION AND CURETTAGE      HX LEEP PROCEDURE         Social History     Tobacco Use    Smoking status: Never Smoker    Smokeless tobacco: Never Used   Substance Use Topics    Alcohol use: No     Comment: socially    Drug use: No       Family History   Problem Relation Age of Onset    Diabetes Mother     Hypertension Mother     Heart Attack Mother     Other Mother          of pancreatitis complications    Diabetes Father     Heart Disease Father     Asthma Brother     Stroke Brother 32        He was incarcerated at the time, possible \"poisoning\" but never had autopsy.  No Known Problems Sister     Cancer Cousin         breast cancer    No Known Problems Sister     No Known Problems Sister        Outpatient Medications Marked as Taking for the 19 encounter (Office Visit) with Anthony Reaves NP   Medication Sig Dispense Refill    cyanocobalamin (VITAMIN B-12) 1,000 mcg/mL injection 1 mL by IntraMUSCular route once for 1 dose.  1 mL 0    phentermine (ADIPEX-P) 37.5 mg tablet Take 1 Tab by mouth every morning. Max Daily Amount: 37.5 mg. 30 Tab 0    ergocalciferol (ERGOCALCIFEROL) 50,000 unit capsule Take 1 Cap by mouth every seven (7) days. 5 Cap 2       Current Outpatient Medications on File Prior to Visit   Medication Sig Dispense Refill    [DISCONTINUED] phentermine (ADIPEX-P) 37.5 mg tablet Take 1 Tab by mouth every morning. Max Daily Amount: 37.5 mg. 30 Tab 0    [DISCONTINUED] ergocalciferol (ERGOCALCIFEROL) 50,000 unit capsule Take 1 Cap by mouth every seven (7) days. 5 Cap 2     No current facility-administered medications on file prior to visit. No Known Allergies    PE:  Visit Vitals  /70 (BP 1 Location: Left arm, BP Patient Position: Sitting)   Pulse 80   Temp 97.3 °F (36.3 °C) (Oral)   Resp 18   Ht 5' 3.78\" (1.62 m)   Wt 267 lb 12.8 oz (121.5 kg)   LMP 11/28/2019   SpO2 99%   BMI 46.29 kg/m²       Gen: alert, oriented, no acute distress  Head: normocephalic, atraumatic  Ears: external auditory canals clear, TMs without erythema or effusion  Eyes: pupils equal round reactive to light, sclera clear, conjunctiva clear  Oral: moist mucus membranes, no oral lesions, no pharyngeal inflammation or exudate  Neck: symmetric normal sized thyroid, no carotid bruits, no jugular vein distention  Resp: no increase work of breathing, lungs clear to ausculation bilaterally, no wheezing, rales or rhonchi  CV: S1, S2 normal.  No murmurs, rubs, or gallops. Abd: soft, not tender, not distended. No hepatosplenomegaly. Normal bowel sounds. No hernias. No abdominal or renal bruits. Neuro: cranial nerves intact, normal strength and movement in all extremities, reflexes and sensation intact and symmetric. Skin: no lesion or rash  Extremities: no cyanosis or edema    No results found for this visit on 12/04/19.      Assessment/Plan:    ICD-10-CM ICD-9-CM    1. Vitamin B12 deficiency E53.8 266.2 VITAMIN B12 INJECTION      THER/PROPH/DIAG INJECTION, SUBCUT/IM      cyanocobalamin (VITAMIN B-12) 1,000 mcg/mL injection   2. Weight loss R63.4 783.21    3. Vitamin D deficiency E55.9 268.9 ergocalciferol (ERGOCALCIFEROL) 50,000 unit capsule   4. Encounter for weight management Z76.89 V65.49 phentermine (ADIPEX-P) 37.5 mg tablet   5. Morbid obesity with BMI of 45.0-49.9, adult (HCC) E66.01 278.01 phentermine (ADIPEX-P) 37.5 mg tablet    Z68.42 V85.42    6. Obesity, morbid ([address] E66.01 278.01 phentermine (ADIPEX-P) 37.5 mg tablet     Diagnoses and all orders for this visit:    1. Vitamin B12 deficiency  -     VITAMIN B12 INJECTION  -     THER/PROPH/DIAG INJECTION, SUBCUT/IM  -     cyanocobalamin (VITAMIN B-12) 1,000 mcg/mL injection; 1 mL by IntraMUSCular route once for 1 dose. 2. Weight loss    3. Vitamin D deficiency  -     ergocalciferol (ERGOCALCIFEROL) 50,000 unit capsule; Take 1 Cap by mouth every seven (7) days. 4. Encounter for weight management  -     phentermine (ADIPEX-P) 37.5 mg tablet; Take 1 Tab by mouth every morning. Max Daily Amount: 37.5 mg.    5. Morbid obesity with BMI of 45.0-49.9, adult (HCC)  -     phentermine (ADIPEX-P) 37.5 mg tablet; Take 1 Tab by mouth every morning. Max Daily Amount: 37.5 mg.    6. Obesity, morbid (HCC)  -     phentermine (ADIPEX-P) 37.5 mg tablet; Take 1 Tab by mouth every morning. Max Daily Amount: 37.5 mg. Follow-up and Dispositions    · Return in about 4 weeks (around 1/1/2020) for Medication Check, Weight Mgmt, Phent F/U, Vit B12 Injection. lab results and schedule of future lab studies reviewed with patient - recheck Vitamin B12, Vitamin D in 3 months in 1/2020. reviewed diet, exercise and weight control  reviewed medications and side effects in detail    lose weight, increase physical activity, continue present plan, call if any problems    Health Maintenance reviewed - deferred to PCP. Recommended healthy diet low in carbohydrates, fats, sodium and cholesterol. Recommended regular cardiovascular exercise 3-6 times per week for 30-60 minutes daily. Chart is reviewed and updated today in the office. Records requested for other providers patient has seen and is currently seeing. Patient was offered a choice/choices in the treatment plan today. Patient expresses understanding of the plan and agrees with recommendations. Verbal and written instructions (see AVS) provided. See patient instructions for more. Patient expresses understanding of diagnosis and treatment plan.

## 2019-12-04 NOTE — PATIENT INSTRUCTIONS
Learning About Low-Carbohydrate Diets for Weight Loss What is a low-carbohydrate diet? Low-carb diets avoid foods that are high in carbohydrate. These high-carb foods include pasta, bread, rice, cereal, fruits, and starchy vegetables. Instead, these diets usually have you eat foods that are high in fat and protein. Many people lose weight quickly on a low-carb diet. But the early weight loss is water. People on this diet often gain the weight back after they start eating carbs again. Not all diet plans are safe or work well. A lot of the evidence shows that low-carb diets aren't healthy. That's because these diets often don't include healthy foods like fruits and vegetables. Losing weight safely means balancing protein, fat, and carbs with every meal and snack. And low-carb diets don't always provide the vitamins, minerals, and fiber you need. If you have a serious medical condition, talk to your doctor before you try any diet. These conditions include kidney disease, heart disease, type 2 diabetes, high cholesterol, and high blood pressure. If you are pregnant, it may not be safe for your baby if you are on a low-carb diet. How can you lose weight safely? You might have heard that a diet plan helped another person lose weight. But that doesn't mean that it will work for you. It is very hard to stay on a diet that includes lots of big changes in your eating habits. If you want to get to a healthy weight and stay there, making healthy lifestyle changes will often work better than dieting. These steps can help. · Make a plan for change. Work with your doctor to create a plan that is right for you. · See a dietitian. He or she can show you how to make healthy changes in your eating habits. · Manage stress. If you have a lot of stress in your life, it can be hard to focus on making healthy changes to your daily habits. · Track your food and activity.  You are likely to do better at losing weight if you keep track of what you eat and what you do. Follow-up care is a key part of your treatment and safety. Be sure to make and go to all appointments, and call your doctor if you are having problems. It's also a good idea to know your test results and keep a list of the medicines you take. Where can you learn more? Go to http://darrell-saman.info/. Enter A121 in the search box to learn more about \"Learning About Low-Carbohydrate Diets for Weight Loss. \" Current as of: November 7, 2018 Content Version: 12.2 © 0349-3347 [employer]. Care instructions adapted under license by [employer] (which disclaims liability or warranty for this information). If you have questions about a medical condition or this instruction, always ask your healthcare professional. [address] any warranty or liability for your use of this information. Learning About Vitamin D Why is it important to get enough vitamin D? Your body needs vitamin D to absorb calcium. Calcium keeps your bones and muscles, including your heart, healthy and strong. If your muscles don't get enough calcium, they can cramp, hurt, or feel weak. You may have long-term (chronic) muscle aches and pains. If you don't get enough vitamin D throughout life, you have an increased chance of having thin and brittle bones (osteoporosis) in your later years. Children who don't get enough vitamin D may not grow as much as others their age. They also have a chance of getting a rare disease called rickets. It causes weak bones. Vitamin D and calcium are added to many foods. And your body uses sunshine to make its own vitamin D. How much vitamin D do you need? The [place] [employer] recommends that people ages 3 through 79 get 600 IU (international units) every day. Adults 71 and older need 800 IU every day. Blood tests for vitamin D can check your vitamin D level.  But there is no standard normal range used by all laboratories. You're likely getting enough vitamin D if your levels are in the range of 20 to 50 ng/mL. How can you get more vitamin D? Foods that contain vitamin D include: 
· [place], tuna, and mackerel. These are some of the best foods to eat when you need to get more vitamin D. 
· Cheese, egg yolks, and beef liver. These foods have vitamin D in small amounts. · Milk, soy drinks, orange juice, yogurt, margarine, and some kinds of cereal have vitamin D added to them. Some people don't make vitamin D as well as others. They may have to take extra care in getting enough vitamin D. Things that reduce how much vitamin D your body makes include: · Dark skin, such as many  Americans have. · Age, especially if you are older than 72. · Digestive problems, such as Crohn's or celiac disease. · Liver and kidney disease. Some people who do not get enough vitamin D may need supplements. Are there any risks from taking vitamin D? 
· Too much vitamin D: 
? Can damage your kidneys. ? Can cause nausea and vomiting, constipation, and weakness. ? Raises the amount of calcium in your blood. If this happens, you can get confused or have an irregular heart rhythm. · Vitamin D may interact with other medicines. Tell your doctor about all of the medicines you take, including over-the-counter drugs, herbs, and pills. Tell your doctor about all of your current medical problems. Where can you learn more? Go to http://darrell-saman.info/. Enter N065 in the search box to learn more about \"Learning About Vitamin D.\" 
Current as of: November 7, 2018 Content Version: 12.2 © 5353-7785 [employer]. Care instructions adapted under license by [employer] (which disclaims liability or warranty for this information).  If you have questions about a medical condition or this instruction, always ask your healthcare professional. [employer] disclaims any warranty or liability for your use of this information.

## 2021-02-03 ENCOUNTER — TELEPHONE (OUTPATIENT)
Dept: INTERNAL MEDICINE CLINIC | Age: 36
End: 2021-02-03

## 2021-02-03 NOTE — TELEPHONE ENCOUNTER
Pt called to schedule NP appt, confirmed pt has BCBS for insurance. Added pt to list for scheduling with [facility] pending insurance credentialing.

## 2021-09-22 ENCOUNTER — OFFICE VISIT (OUTPATIENT)
Dept: URGENT CARE | Age: 36
End: 2021-09-22
Payer: MEDICAID

## 2021-09-22 VITALS — OXYGEN SATURATION: 98 % | TEMPERATURE: 98.6 F | HEART RATE: 83 BPM | RESPIRATION RATE: 16 BRPM

## 2021-09-22 DIAGNOSIS — Z20.822 EXPOSURE TO COVID-19 VIRUS: Primary | ICD-10-CM

## 2021-09-22 LAB — SARS-COV-2 POC: NEGATIVE

## 2021-09-22 PROCEDURE — 87426 SARSCOV CORONAVIRUS AG IA: CPT | Performed by: FAMILY MEDICINE

## 2021-09-22 PROCEDURE — 99212 OFFICE O/P EST SF 10 MIN: CPT | Performed by: FAMILY MEDICINE

## 2021-09-22 NOTE — PROGRESS NOTES
This patient was seen at [address] [place] [address] Urgent Care while in their vehicle due to COVID-19 pandemic with PPE and focused examination in order to decrease community viral transmission. The patient/guardian gave verbal consent to treat. The history is provided by the patient. Asymptomatic- exposed from her oldest son  , who tested positive on Monday     Not vaccinated    Past Medical History:   Diagnosis Date    Abnormal Papanicolaou smear of cervix     Leep procedure in     Anemia     \"borderline\" no supplement    Knee pain, left 2013    Postpartum depression 2017    after last baby, who is 2 years at this time        Past Surgical History:   Procedure Laterality Date    HX  SECTION  2009    HX DILATION AND CURETTAGE      HX LEEP PROCEDURE      [place]  DELIVERY ONLY      2009 - boy, 10/18/2013 - girl, 10/6/2014 - twins (girl and boy), 2017 - boy         Family History   Problem Relation Age of Onset    Diabetes Mother     Hypertension Mother     Heart Attack Mother     Other Mother          of pancreatitis complications    Diabetes Father     Heart Disease Father     Asthma Brother     Stroke Brother 32        He was incarcerated at the time, possible \"poisoning\" but never had autopsy.     No Known Problems Sister     Cancer Cousin         breast cancer    No Known Problems Sister     No Known Problems Sister         Social History     Socioeconomic History    Marital status: SINGLE     Spouse name: Not on file    Number of children: Not on file    Years of education: Not on file    Highest education level: Not on file   Occupational History    Not on file   Tobacco Use    Smoking status: Never Smoker    Smokeless tobacco: Never Used   Substance and Sexual Activity    Alcohol use: No     Comment: socially    Drug use: No    Sexual activity: Yes     Partners: Male     Birth control/protection: None   Other Topics Concern    Not on file   Social History Narrative    Not on file     Social Determinants of Health     Financial Resource Strain:     Difficulty of Paying Living Expenses:    Food Insecurity:     Worried About Running Out of Food in the Last Year:     [address] in the Last Year:    Transportation Needs:     Lack of Transportation (Medical):  Lack of Transportation (Non-Medical):    Physical Activity:     Days of Exercise per Week:     Minutes of Exercise per Session:    Stress:     Feeling of Stress :    Social Connections:     Frequency of Communication with Friends and Family:     Frequency of Social Gatherings with Friends and Family:     Attends Latter day Services:     Active Member of Clubs or Organizations:     Attends Club or Organization Meetings:     Marital Status:    Intimate Partner Violence:     Fear of Current or Ex-Partner:     Emotionally Abused:     Physically Abused:     Sexually Abused: ALLERGIES: Patient has no known allergies. Review of Systems   All other systems reviewed and are negative. There were no vitals filed for this visit. Physical Exam  Vitals and nursing note reviewed. Constitutional:       General: She is not in acute distress. Appearance: She is not ill-appearing. Pulmonary:      Effort: Pulmonary effort is normal. No respiratory distress. Breath sounds: No wheezing. MDM    Procedures        ICD-10-CM ICD-9-CM    1. Exposure to COVID-19 virus  Z20.822 V01.79 AMB POC SARS-COV-2     No orders of the defined types were placed in this encounter. No results found for any visits on 09/22/21. The patients condition was discussed with the patient and they understand. The patient is to follow up with primary care doctor. If signs and symptoms become worse the pt is to go to the ER. The patient is to take medications as prescribed.

## 2022-03-02 ENCOUNTER — OFFICE VISIT (OUTPATIENT)
Dept: ORTHOPEDIC SURGERY | Age: 37
End: 2022-03-02
Payer: MEDICAID

## 2022-03-02 VITALS — HEIGHT: 65 IN | WEIGHT: 293 LBS | BODY MASS INDEX: 48.82 KG/M2

## 2022-03-02 DIAGNOSIS — M25.519 SHOULDER PAIN, UNSPECIFIED CHRONICITY, UNSPECIFIED LATERALITY: Primary | ICD-10-CM

## 2022-03-02 DIAGNOSIS — S46.011A TRAUMATIC COMPLETE TEAR OF RIGHT ROTATOR CUFF, INITIAL ENCOUNTER: ICD-10-CM

## 2022-03-02 PROCEDURE — 99204 OFFICE O/P NEW MOD 45 MIN: CPT | Performed by: ORTHOPAEDIC SURGERY

## 2022-03-02 NOTE — PROGRESS NOTES
ASSESSMENT/PLAN:  Below is the assessment and plan developed based on review of pertinent history, physical exam, labs, studies, and medications. 1. Shoulder pain, unspecified chronicity, unspecified laterality  -     XR SHOULDER RT AP/LAT MIN 2 V; Future  2. Traumatic complete tear of right rotator cuff, initial encounter  -     MRI SHOULDER RT WO CONT; Future      Return for Follow-up after diagnostic test.    In discussion with the patient, we considered the numerus possible diagnoses that could be contributing to their present symptoms. We also deliberated on the extensive management options that must be considered to treat their current condition. We reviewed their accessible prior medical records, diagnostic tests, and current health and employment information. We considered how these symptoms were affecting the patient´s activities of daily living as well as employment and fitness activities. The patient had various questions regarding the possible risks, benefits, complications, morbidity and mortality regarding their diagnosis and treatment options. The patients´ comorbidities were considered, and I advocated that they consider maximizing lifestyle modification through nutrition and exercise to aid in addressing their symptoms. Shared decision making yielded an understanding to move forward with conservation treatment preferences. The patient expressed understanding that if conservative management fails to alleviate the present symptoms they will return to office for re-evaluation and consideration of additional diagnostic tests and potential surgical options. In the interim, we have recommended ice, elevation, and anti-inflammatory medications along with a physician directed home exercise program. We discussed the risks and common side effects of anti-inflammatory medications and instructed the patient to discontinue the medication and contact us if they experienced any side effects.  The patient was encouraged to discuss the possible side effects with their family physician or pharmacist prior to initiating any new medications. Given that the patient's symptoms are increasing in frequency and duration, we have decided to evaluate the etiology of the pain and loss of function with an MRI. We discussed the risks of an MRI which include, but are not limited to the enclosed space, noisy environment, magnetic effect on implanted metal. We also talked about the fact that MRI is also contraindicated in the presence of internal metallic objects such as bullets or shrapnel, as well as surgical clips, pins, plates, screws, metal sutures, or wire mesh. We talked about the fact that MRI does not use radiation, but it may be contrindicated if the patient has implanted pacemakers, intracranial aneurysm clips, cochlear implants, certain prosthetic devices, implanted drug infusion pumps, neurostimulators, bone-growth stimulators, certain intrauterine contraceptive devices; or any other type of iron-based metal implants. We discussed the fact that if you are pregnant or suspect that you may be pregnant, you should notify your physician and consult with your primary care or obstetrician before having an MRI. Although rare, we talked about the fact that if contrast dye is used, there is a risk for allergic reaction to the dye. Patients who are allergic to or sensitive to medications, contrast dye, iodine, or shellfish should notify the radiologist or technologist prior to the administration of dye. MRI contrast may also influence other conditions such as allergies, asthma, anemia, hypotension (low blood pressure), and sickle cell disease. The patient has expressed understanding of these risks and I will see the patient back after the MRI to discuss the findings as well as the treatment options. Given her traumatic injury at work, I am concerned for rotator cuff tear in her right shoulder. I will see her back after MRI. We will place her on a home exercise program to decrease her stiffness. Should continue to ice elevate. SUBJECTIVE/OBJECTIVE:  Latia Tellez (: 1985) is a 39 y.o. female, patient,here for evaluation of the Shoulder Pain (right shoulder)  . The patient's evaluated today for right shoulder. Unfortunately, she was involved in a work accident at [employer] where she was lifting off a conveyor belt felt a pop in her shoulder. Since that time she has had moderate pain aching nature. She reports her shoulder aches. She reports she feels weak with overactivity. She denies any previous injuries to the shoulder. She reports rest does make it better but activity makes it worse. She reports it bothers her at night. Physical Exam    Upon physical examination, the patient is well developed, well nourished, alert and oriented times three, with normal mood and affect and walks with a normal gait. Upon examination of the right shoulder, the patient sits with the scapula protracted and depressed. They are tender to palpation over the anterior supraspinatus and proximal biceps. They also are tender to palpation over the acromioclavicular joint and have discomfort with cross adduction testing. There is mild palpable crepitus in the subacromial space with ranging. The patient has limited range of motion, a painful arc test and discomfort with above shoulder range of motion. The patient has discomfort with Neer and Sanchez impingement maneuvers and Whipple testing. The shoulder is stable on exam. They have 5/5 strength with internal rotation, but are significantly weak with external rotation and supraspinatus isolation testing, and are neurovascularly intact distally. There is no erythema, warmth or skin lesions present. On examination of the contralateral extremity, the patient is nontender to palpation and has excellent range of motion, stability and strength.       Imaging:    3 views the right shoulder demonstrate an acromial spur as well as some AC joint arthrosis. No Known Allergies    No current outpatient medications on file. No current facility-administered medications for this visit. Past Medical History:   Diagnosis Date    Abnormal Papanicolaou smear of cervix     Leep procedure in     Anemia     \"borderline\" no supplement    Knee pain, left 2013    Postpartum depression 2017    after last baby, who is 2 years at this time       Past Surgical History:   Procedure Laterality Date    HX  SECTION  2009    HX DILATION AND CURETTAGE      HX LEEP PROCEDURE      MI  DELIVERY ONLY      2009 - boy, 10/18/2013 - girl, 10/6/2014 - twins (girl and boy), 2017 - boy       Family History   Problem Relation Age of Onset    Diabetes Mother     Hypertension Mother     Heart Attack Mother     Other Mother          of pancreatitis complications    Diabetes Father     Heart Disease Father     Asthma Brother     Stroke Brother 32        He was incarcerated at the time, possible \"poisoning\" but never had autopsy.     No Known Problems Sister     Cancer Cousin         breast cancer    No Known Problems Sister     No Known Problems Sister        Social History     Socioeconomic History    Marital status: SINGLE     Spouse name: Not on file    Number of children: Not on file    Years of education: Not on file    Highest education level: Not on file   Occupational History    Not on file   Tobacco Use    Smoking status: Never Smoker    Smokeless tobacco: Never Used   Vaping Use    Vaping Use: Never used   Substance and Sexual Activity    Alcohol use: No     Comment: socially    Drug use: No    Sexual activity: Yes     Partners: Male     Birth control/protection: None   Other Topics Concern    Not on file   Social History Narrative    Not on file     Social Determinants of Health     Financial Resource Strain:     Difficulty of Paying Living Expenses: Not on file   Food Insecurity:     Worried About Running Out of Food in the Last Year: Not on file    Ran Out of Food in the Last Year: Not on file   Transportation Needs:     Lack of Transportation (Medical): Not on file    Lack of Transportation (Non-Medical): Not on file   Physical Activity:     Days of Exercise per Week: Not on file    Minutes of Exercise per Session: Not on file   Stress:     Feeling of Stress : Not on file   Social Connections:     Frequency of Communication with Friends and Family: Not on file    Frequency of Social Gatherings with Friends and Family: Not on file    Attends Worship Services: Not on file    Active Member of [address] [place] [address] [employer] or Organizations: Not on file    Attends Club or Organization Meetings: Not on file    Marital Status: Not on file   Intimate Partner Violence:     Fear of Current or Ex-Partner: Not on file    Emotionally Abused: Not on file    Physically Abused: Not on file    Sexually Abused: Not on file   Housing Stability:     Unable to Pay for Housing in the Last Year: Not on file    Number of [place] in the Last Year: Not on file    Unstable Housing in the Last Year: Not on file       Review of Systems    No flowsheet data found. Vitals:  Ht 5' 5\" (1.651 m)   Wt 300 lb (136.1 kg)   BMI 49.92 kg/m²    Body mass index is 49.92 kg/m². An electronic signature was used to authenticate this note.   -- Saúl Yu MD

## 2022-03-18 PROBLEM — Z76.89 ENCOUNTER FOR WEIGHT MANAGEMENT: Status: ACTIVE | Noted: 2019-07-29

## 2022-03-18 PROBLEM — Z86.2 HISTORY OF ANEMIA: Status: ACTIVE | Noted: 2019-07-29

## 2022-03-19 PROBLEM — E66.01 MORBID OBESITY WITH BMI OF 45.0-49.9, ADULT (HCC): Status: ACTIVE | Noted: 2019-07-29

## 2022-03-19 PROBLEM — Z86.2 H/O SICKLE CELL TRAIT: Status: ACTIVE | Noted: 2019-07-29

## 2022-03-19 PROBLEM — Z83.3 FAMILY HISTORY OF DIABETES MELLITUS: Status: ACTIVE | Noted: 2019-07-29

## 2022-03-19 PROBLEM — R63.4 WEIGHT LOSS: Status: ACTIVE | Noted: 2019-10-22

## 2022-03-19 PROBLEM — E53.8 VITAMIN B12 DEFICIENCY: Status: ACTIVE | Noted: 2019-08-02

## 2022-03-19 PROBLEM — E55.9 VITAMIN D DEFICIENCY: Status: ACTIVE | Noted: 2019-08-02

## 2022-03-20 PROBLEM — E66.01 OBESITY, MORBID (HCC): Status: ACTIVE | Noted: 2018-10-29

## 2022-03-29 ENCOUNTER — HOSPITAL ENCOUNTER (OUTPATIENT)
Dept: MRI IMAGING | Age: 37
Discharge: HOME | End: 2022-03-29
Attending: ORTHOPAEDIC SURGERY
Payer: MEDICAID

## 2022-03-29 DIAGNOSIS — S46.011A TRAUMATIC COMPLETE TEAR OF RIGHT ROTATOR CUFF, INITIAL ENCOUNTER: ICD-10-CM

## 2022-03-29 PROCEDURE — 73221 MRI JOINT UPR EXTREM W/O DYE: CPT

## 2022-03-31 PROBLEM — S46.011A TRAUMATIC COMPLETE TEAR OF RIGHT ROTATOR CUFF: Status: ACTIVE | Noted: 2022-03-31

## 2022-03-31 NOTE — PROGRESS NOTES
ASSESSMENT/PLAN:  Below is the assessment and plan developed based on review of pertinent history, physical exam, labs, studies, and medications. 1. Shoulder pain, unspecified chronicity, unspecified laterality  -     XR SHOULDER RT AP/LAT MIN 2 V; Future  2. Traumatic complete tear of right rotator cuff, initial encounter  -     MRI SHOULDER RT WO CONT; Future      We discussed the treatment options for the patient´s diagnosis, which included: living with the extremity as it is, organized exercises, medicines, injections, and surgical options. Utilizing shared treatment decision-making; we also discussed the nature and purpose of the treatment options along with the expected risks and benefits. The patient has expressed a desire to proceed with surgery, and I think that is a reasonable option. I educated the patient regarding the inherent and unavoidable risks which include, but are not limited to anesthesia, infection, damage to nerves and blood vessels, blood loss, blood clots, and even death were discussed at length. We also talked about the possibility of not being able to return to prior activities or employment, the need for future surgery, and complex regional pain syndrome. The patient expressed understanding of these risks and has elected to proceed with surgery. Ample time was given for questions, of which many were addressed. We have discussed the surgical procedure as well as the realistic expectations regarding the risks, outcome and post-operative protocol. We will set this up when it is convenient for the patient. We have instructed them to contact us if there are any questions or concerns between now and their date of surgery. We talked about the procedure as well as the postoperative protocol in detail.   She is can schedule right shoulder arthroscopy with rotator cuff repair, arthroscopic biceps tenodesis, acromioplasty and distal clavicle excision with extensive debridement and capsular release. She understands the lengthy rehabilitation involved after her work injury. She understands she will be in a sling for many months. SUBJECTIVE/OBJECTIVE:  Aries Knapp (: 1985) is a 39 y.o. female, patient,here for evaluation of the Shoulder Pain (right shoulder)  . The patient's evaluated today for right shoulder. Unfortunately, she was involved in a work accident at [employer] where she was lifting off a conveyor belt felt a pop in her shoulder. Since that time she has had moderate pain aching nature. She reports her shoulder aches. She reports she feels weak with overactivity. She denies any previous injuries to the shoulder. She reports rest does make it better but activity makes it worse. She reports it bothers her at night. Physical Exam    Upon physical examination, the patient is well developed, well nourished, alert and oriented times three, with normal mood and affect and walks with a normal gait. Upon examination of the right shoulder, the patient sits with the scapula protracted and depressed. They are tender to palpation over the anterior supraspinatus and proximal biceps. They also are tender to palpation over the acromioclavicular joint and have discomfort with cross adduction testing. There is mild palpable crepitus in the subacromial space with ranging. The patient has limited range of motion, a painful arc test and discomfort with above shoulder range of motion. The patient has discomfort with Neer and Sanchez impingement maneuvers and Whipple testing. The shoulder is stable on exam. They have 5/5 strength with internal rotation, but are significantly weak with external rotation and supraspinatus isolation testing, and are neurovascularly intact distally. There is no erythema, warmth or skin lesions present.     On examination of the contralateral extremity, the patient is nontender to palpation and has excellent range of motion, stability and strength. Imaging:    MRI SHOULDER RT WO CONT  Narrative: EXAM: MRI SHOULDER RT WO CONT    INDICATION: Dx: Traumatic complete tear of right rotator cuff, initial encounter  [T05.275E (ICD-10-CM)]    COMPARISON: Right shoulder radiographs 3/2/2022    TECHNIQUE: Axial proton density fat-saturated; oblique coronal T1, T2  fat-saturated, and proton density fat-saturated; and oblique sagittal T2  fat-saturated MRI of the right shoulder . CONTRAST: None. FINDINGS: A.C. joint: Mild osteoarthritis, with prominent edema signal in the  distal clavicle and a small joint effusion. Anterior acromion process type: Os  acromiale, with cystic change about the pseudoarthrosis    Bone marrow: No acute fracture, dislocation, or marrow replacing process. Subtle  concave deformity of the posterosuperior humeral head (7-10), may reflect a  Hill-Sachs lesion    Joint fluid: Trace joint effusion. Moderate-sized subacromial/subdeltoid bursal  effusion. Rotator cuff tendons: Full thickness tear of the junctional fibers of  supraspinatus and infraspinatus measuring 12 mm AP with retraction up to the  lateral humeral head level. Associated high grade attritional and undersurface  tear at the superior infraspinatus footprint, with intrasubstance delamination  into the myotendinous junction. Additional moderate grade bursal surface tear at  the mid supraspinatus footprint. Full-thickness tear, partial width with  retraction of the superior subscapularis tendon     Biceps tendon: Intact and located within the bicipital groove. Muscles: No edema. Disproportionate atrophy and fatty infiltration of the  subscapularis muscle superiorly    Glenoid labrum: Intact. Glenohumeral joint capsule: within normal limits. Glenohumeral articular cartilage: Intact. No focal osteochondral lesion. Soft tissue mass: None. Impression: 1.   Small full-thickness tear at the junctional fibers of supraspinatus and  infraspinatus with retraction up to the mid humeral head level. Associated high  grade superior infraspinatus tendon tear with intrasubstance delamination. 2.  Full-thickness tear of the superior subscapularis tendon with retraction  associated muscle atrophy. 3.  Moderate grade bursal surface tear at the mid supraspinatus footprint. 4.  Possible subtle Hill-Sachs lesion. 5.  Os acromiale with cystic changes about the pseudoarthrosis, correlate for  painful os acromiale. 6.  Distal clavicular prominent edema signal, may be degenerative, but correlate  for distal clavicular osteolysis. No Known Allergies    No current outpatient medications on file. No current facility-administered medications for this visit. Past Medical History:   Diagnosis Date    Abnormal Papanicolaou smear of cervix     Leep procedure in     Anemia     \"borderline\" no supplement    Knee pain, left 2013    Postpartum depression 2017    after last baby, who is 2 years at this time       Past Surgical History:   Procedure Laterality Date    HX  SECTION  2009    HX DILATION AND CURETTAGE      HX LEEP PROCEDURE      ND  DELIVERY ONLY      2009 - boy, 10/18/2013 - girl, 10/6/2014 - twins (girl and boy), 2017 - boy       Family History   Problem Relation Age of Onset    Diabetes Mother     Hypertension Mother     Heart Attack Mother     Other Mother          of pancreatitis complications    Diabetes Father     Heart Disease Father     Asthma Brother     Stroke Brother 32        He was incarcerated at the time, possible \"poisoning\" but never had autopsy.     No Known Problems Sister     Cancer Cousin         breast cancer    No Known Problems Sister     No Known Problems Sister        Social History     Socioeconomic History    Marital status: SINGLE     Spouse name: Not on file    Number of children: Not on file    Years of education: Not on file    Highest education level: Not on file   Occupational History    Not on file   Tobacco Use    Smoking status: Never Smoker    Smokeless tobacco: Never Used   Vaping Use    Vaping Use: Never used   Substance and Sexual Activity    Alcohol use: No     Comment: socially    Drug use: No    Sexual activity: Yes     Partners: Male     Birth control/protection: None   Other Topics Concern    Not on file   Social History Narrative    Not on file     Social Determinants of Health     Financial Resource Strain:     Difficulty of Paying Living Expenses: Not on file   Food Insecurity:     Worried About Running Out of Food in the Last Year: Not on file    Mik of Food in the Last Year: Not on file   Transportation Needs:     Lack of Transportation (Medical): Not on file    Lack of Transportation (Non-Medical): Not on file   Physical Activity:     Days of Exercise per Week: Not on file    Minutes of Exercise per Session: Not on file   Stress:     Feeling of Stress : Not on file   Social Connections:     Frequency of Communication with Friends and Family: Not on file    Frequency of Social Gatherings with Friends and Family: Not on file    Attends [employer] Services: Not on file    Active Member of [address] [place] [address] or Organizations: Not on file    Attends Club or Organization Meetings: Not on file    Marital Status: Not on file   Intimate Partner Violence:     Fear of Current or Ex-Partner: Not on file    Emotionally Abused: Not on file    Physically Abused: Not on file    Sexually Abused: Not on file   Housing Stability:     Unable to Pay for Housing in the Last Year: Not on file    Number of [place] in the Last Year: Not on file    Unstable Housing in the Last Year: Not on file       Review of Systems    No flowsheet data found. Vitals:  Ht 5' 5\" (1.651 m)   Wt 300 lb (136.1 kg)   BMI 49.92 kg/m²    Body mass index is 49.92 kg/m². An electronic signature was used to authenticate this note.   -- Ye Meng, MD

## 2022-04-01 ENCOUNTER — OFFICE VISIT (OUTPATIENT)
Dept: ORTHOPEDIC SURGERY | Age: 37
End: 2022-04-01
Payer: MEDICAID

## 2022-04-01 VITALS — WEIGHT: 293 LBS | BODY MASS INDEX: 48.82 KG/M2 | HEIGHT: 65 IN

## 2022-04-01 DIAGNOSIS — S46.011A TRAUMATIC COMPLETE TEAR OF RIGHT ROTATOR CUFF, INITIAL ENCOUNTER: Primary | ICD-10-CM

## 2022-04-01 PROCEDURE — 99214 OFFICE O/P EST MOD 30 MIN: CPT | Performed by: ORTHOPAEDIC SURGERY

## 2022-04-01 RX ORDER — IBUPROFEN 800 MG/1
800 TABLET ORAL 3 TIMES DAILY
Qty: 90 TABLET | Refills: 3 | Status: SHIPPED | OUTPATIENT
Start: 2022-04-01

## 2022-06-01 ENCOUNTER — OFFICE VISIT (OUTPATIENT)
Dept: ORTHOPEDIC SURGERY | Age: 37
End: 2022-06-01
Payer: MEDICAID

## 2022-06-01 VITALS — HEIGHT: 65 IN | WEIGHT: 293 LBS | BODY MASS INDEX: 48.82 KG/M2

## 2022-06-01 DIAGNOSIS — G89.29 CHRONIC BILATERAL LOW BACK PAIN WITHOUT SCIATICA: Primary | ICD-10-CM

## 2022-06-01 DIAGNOSIS — M54.50 CHRONIC BILATERAL LOW BACK PAIN WITHOUT SCIATICA: Primary | ICD-10-CM

## 2022-06-01 DIAGNOSIS — M51.36 DDD (DEGENERATIVE DISC DISEASE), LUMBAR: ICD-10-CM

## 2022-06-01 PROCEDURE — 99204 OFFICE O/P NEW MOD 45 MIN: CPT | Performed by: PHYSICIAN ASSISTANT

## 2022-06-01 RX ORDER — DICLOFENAC SODIUM 75 MG/1
75 TABLET, DELAYED RELEASE ORAL 2 TIMES DAILY WITH MEALS
Qty: 60 TABLET | Refills: 1 | Status: SHIPPED | OUTPATIENT
Start: 2022-06-01

## 2022-06-01 NOTE — PROGRESS NOTES
1. Have you been to the ER, urgent care clinic since your last visit? Hospitalized since your last visit? No    2. Have you seen or consulted any other health care providers outside of the [address] [place] [address] since your last visit? Include any pap smears or colon screening.  No    Chief Complaint   Patient presents with    Back Pain

## 2022-06-01 NOTE — PROGRESS NOTES
Aries Knapp (: 1985) is a 44 y.o. female patient here for evaluation of the following chief complaint(s):  Back Pain         ASSESSMENT/PLAN:  Below is the assessment and plan developed based on review of pertinent history, physical exam, labs, studies, and medications. 1. Chronic bilateral low back pain without sciatica  -     XR SPINE LUMB MIN 4 V; Future  -     REFERRAL TO PHYSICAL THERAPY; Future  2. DDD (degenerative disc disease), lumbar  -     REFERRAL TO PHYSICAL THERAPY; Future      The patient's radiologic findings have been reviewed with her in detail today. She has longstanding history of intermittent low back pain that has been progressively worsening for the past 8 years. She has no radicular symptoms. She has not had any treatment for her symptoms thus far, and I would like for her to start with a formal course of outpatient physical therapy as well as diclofenac. We will see her back for follow-up visit in 8 weeks, whereupon she will be a candidate for a MRI with persistent or worsening symptoms. Return in about 2 months (around 2022) for PT follow up. SUBJECTIVE/OBJECTIVE:  Aries Knapp (: 1985) is a 44 y.o. female who presents today for the following:  Chief Complaint   Patient presents with    Back Pain        HPI   Ms. Killian Villaseñor is today as a new patient to the practice. She has a longstanding history of chronic low back pain that began after her second pregnancy approximately 8 years ago. She states that her symptoms are intermittent in nature but are frequent and have been progressively worsening. She denies any leg pain, numbness, or tingling in her leg. She reports subjective weakness in her bilateral legs. No changes in bowel or bladder control. She has been using ibuprofen on an as-needed basis. She has not had any treatment for her back thus far.     IMAGING:  XR Results (most recent):  Results from Appointment encounter on 22    XR SPINE LUMB MIN 4 V    Narrative  Lateral, flexion, and extension films of the lumbar spine reveal no evidence of acute fracture, lytic lesion, or instability. There is a well-maintained lumbar lordosis. The presence of mild disc degeneration at L4-5 with mild to moderate disc narrowing at L5-S1. MRI Results (most recent):       No Known Allergies    Current Outpatient Medications   Medication Sig    diclofenac EC (VOLTAREN) 75 mg EC tablet Take 1 Tablet by mouth two (2) times daily (with meals).  ibuprofen (MOTRIN) 800 mg tablet Take 1 Tablet by mouth three (3) times daily. (Patient not taking: Reported on 2022)     No current facility-administered medications for this visit. Past Medical History:   Diagnosis Date    Abnormal Papanicolaou smear of cervix     Leep procedure in     Anemia     \"borderline\" no supplement    Knee pain, left 2013    Postpartum depression 2017    after last baby, who is 2 years at this time        Past Surgical History:   Procedure Laterality Date    HX  SECTION  2009    HX DILATION AND CURETTAGE      HX LEEP PROCEDURE      [place]  DELIVERY ONLY      2009 - boy, 10/18/2013 - girl, 10/6/2014 - twins (girl and boy), 2017 - boy       Family History   Problem Relation Age of Onset    Diabetes Mother     Hypertension Mother     Heart Attack Mother     Other Mother          of pancreatitis complications    Diabetes Father     Heart Disease Father     Asthma Brother     Stroke Brother 32        He was incarcerated at the time, possible \"poisoning\" but never had autopsy.  No Known Problems Sister     Cancer Cousin         breast cancer    No Known Problems Sister     No Known Problems Sister         Social History     Tobacco Use    Smoking status: Never Smoker    Smokeless tobacco: Never Used   Substance Use Topics    Alcohol use: No     Comment: socially        Review of Systems   Constitutional: Negative. Respiratory: Negative. Cardiovascular: Negative. Gastrointestinal: Negative. Endocrine: Negative. Genitourinary: Negative. Musculoskeletal: Positive for back pain. Skin: Negative. Allergic/Immunologic: Negative. Hematological: Negative. Psychiatric/Behavioral: Negative. All other systems reviewed and are negative. No flowsheet data found. Vitals:  Ht 5' 5\" (1.651 m)   Wt 300 lb (136.1 kg)   BMI 49.92 kg/m²    Body mass index is 49.92 kg/m². Physical Exam    Neurologic  Sensory  Light Touch - Intact - Globally. Overall Assessment of Muscle Strength and Tone reveals  Lower Extremities - Right Iliopsoas - 5/5. Left Iliopsoas - 5/5. Right Tibialis Anterior - 5/5. Left Tibialis Anterior - 5/5. Right Gastroc-Soleus - 5/5. Left Gastroc-Soleus - 5/5. Right EHL - 5/5. Left EHL - 5/5. General Assessment of Reflexes  Right Ankle - Clonus is not present. Left Ankle - Clonus is not present. Reflexes (Dermatomes)  2/2 Normal - Left Achilles (L5-S2), Left Knee (L2-4), Right Achilles (L5-S2) and Right Knee (L2-4). Musculoskeletal  Global Assessment  Examination of related systems reveals - well-developed, well-nourished, in no acute distress, alert and oriented x 3. Gait and Station - normal gait and station and normal posture. Right Lower Extremity - normal strength and tone, normal range of motion without pain and no instability, subluxation or laxity. Left Lower Extremity - normal strength and tone, normal range of motion without pain and no instability, subluxation or laxity. Spine/Ribs/Pelvis  Cervical Spine - Examination of the cervical spine reveals - no tenderness to palpation, no pain, no swelling, edema or erythema, normal cervical spine movements and normal sensation. Thoracic (Dorsal) Spine - Examination of the thoracic spine reveals - no tenderness over thoracic vertebrae, no pain, normal sensation and normal thoracic spine movements.  Lumbosacral Spine - Examination of the lumbosacral spine reveals - no known fractures or deformities. Inspection and Palpation - Tenderness - moderate. Assessment of pain reveals the following findings - The pain is characterized as - moderate. Location - pain refers to lower back bilaterally. ROJM - Trunk Extension - 15 degrees. Lumbar Spine Flexion - 35 °. Lumbosacral Spine - Functional Testing - Babinski Test negative, Prone Knee Bending Test negative, Slump Test negative, Straight Leg Raising Test negative. Dr. Sebastián Breaux was available for immediate consult during this encounter. An electronic signature was used to authenticate this note.   -- Napolean Felty, PA

## 2022-06-01 NOTE — LETTER
6/1/2022    Patient: Kathya Ibarra   YOB: 1985   Date of Visit: 6/1/2022     [facility]    Dear Alpesh Roman PA-C,      Thank you for referring Ms. Kathya Ibarra to [facility] for evaluation. My notes for this consultation are attached. If you have questions, please do not hesitate to call me. I look forward to following your patient along with you.       Sincerely,    MARIANNE Hatfield

## 2022-06-14 ENCOUNTER — HOSPITAL ENCOUNTER (EMERGENCY)
Age: 37
Discharge: HOME | End: 2022-06-14
Attending: EMERGENCY MEDICINE | Admitting: EMERGENCY MEDICINE
Payer: MEDICAID

## 2022-06-14 VITALS
DIASTOLIC BLOOD PRESSURE: 97 MMHG | TEMPERATURE: 96.9 F | OXYGEN SATURATION: 98 % | SYSTOLIC BLOOD PRESSURE: 153 MMHG | HEART RATE: 77 BPM | RESPIRATION RATE: 18 BRPM

## 2022-06-14 DIAGNOSIS — H57.11 PAIN OF RIGHT EYE: Primary | ICD-10-CM

## 2022-06-14 PROCEDURE — 74011250636 HC RX REV CODE- 250/636: Performed by: PHYSICIAN ASSISTANT

## 2022-06-14 PROCEDURE — 99284 EMERGENCY DEPT VISIT MOD MDM: CPT

## 2022-06-14 PROCEDURE — 90714 TD VACC NO PRESV 7 YRS+ IM: CPT | Performed by: PHYSICIAN ASSISTANT

## 2022-06-14 PROCEDURE — 74011000250 HC RX REV CODE- 250: Performed by: PHYSICIAN ASSISTANT

## 2022-06-14 PROCEDURE — 90471 IMMUNIZATION ADMIN: CPT

## 2022-06-14 RX ORDER — ERYTHROMYCIN 5 MG/G
OINTMENT OPHTHALMIC
Qty: 3.5 G | Refills: 0 | Status: SHIPPED | OUTPATIENT
Start: 2022-06-14 | End: 2022-06-21

## 2022-06-14 RX ORDER — TETRACAINE HYDROCHLORIDE 5 MG/ML
1 SOLUTION OPHTHALMIC
Status: COMPLETED | OUTPATIENT
Start: 2022-06-14 | End: 2022-06-14

## 2022-06-14 RX ADMIN — FLUORESCEIN SODIUM 1 STRIP: 1 STRIP OPHTHALMIC at 15:38

## 2022-06-14 RX ADMIN — TETANUS AND DIPHTHERIA TOXOIDS ADSORBED 0.5 ML: 2; 2 INJECTION INTRAMUSCULAR at 16:27

## 2022-06-14 RX ADMIN — TETRACAINE HYDROCHLORIDE 1 DROP: 5 SOLUTION OPHTHALMIC at 15:38
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
## 2022-07-07 DIAGNOSIS — S46.011A TRAUMATIC COMPLETE TEAR OF RIGHT ROTATOR CUFF, INITIAL ENCOUNTER: Primary | ICD-10-CM

## 2022-08-17 DIAGNOSIS — S46.011A TRAUMATIC COMPLETE TEAR OF RIGHT ROTATOR CUFF, INITIAL ENCOUNTER: Primary | ICD-10-CM

## 2022-08-17 RX ORDER — OXYCODONE AND ACETAMINOPHEN 5; 325 MG/1; MG/1
1 TABLET ORAL
Qty: 42 TABLET | Refills: 0 | Status: SHIPPED | OUTPATIENT
Start: 2022-08-17 | End: 2022-08-24

## 2022-08-26 ENCOUNTER — OFFICE VISIT (OUTPATIENT)
Dept: ORTHOPEDIC SURGERY | Age: 37
End: 2022-08-26
Payer: MEDICAID

## 2022-08-26 VITALS — HEIGHT: 65 IN | WEIGHT: 293 LBS | BODY MASS INDEX: 48.82 KG/M2

## 2022-08-26 DIAGNOSIS — S46.011A TRAUMATIC COMPLETE TEAR OF RIGHT ROTATOR CUFF, INITIAL ENCOUNTER: Primary | ICD-10-CM

## 2022-08-26 PROCEDURE — 99024 POSTOP FOLLOW-UP VISIT: CPT | Performed by: ORTHOPAEDIC SURGERY

## 2022-08-26 RX ORDER — ERYTHROMYCIN 5 MG/G
OINTMENT OPHTHALMIC
COMMUNITY

## 2022-08-26 RX ORDER — PREDNISOLONE ACETATE 10 MG/ML
SUSPENSION/ DROPS OPHTHALMIC
COMMUNITY
Start: 2022-06-22

## 2022-08-26 NOTE — PROGRESS NOTES
ASSESSMENT/PLAN:  Below is the assessment and plan developed based on review of pertinent history, physical exam, labs, studies, and medications. 1. Traumatic complete tear of right rotator cuff, initial encounter      No follow-ups on file. After discussing treatment options, we have decided to proceed with formal physical therapy as well as a home exercise program for rehabilitation of the shoulder. We reviewed active elbow range of motion and postural exercises today in the office. We went over the arthroscopic pictures and removed the stitches during today´s visit. We will continue with ice and elevation of the shoulder to decrease swelling and pain. We will continue to utilize early mobilization and mechanical prophylaxis to reduce the chances of a deep vein thrombosis. We will wean them off any narcotic medications and progress to anti-inflammatories and Tylenol as long as there are no contraindications to these medications. We also discussed the risk and benefits and common side effects of taking these medications at today´s visit. We also had a discussion regarding not driving while on narcotic medications and while impaired from a surgical or medical condition. I will see them back in three weeks time to evaluate their progress. They will call us in the interim if they have any questions or concerns prior to their follow up visit. SUBJECTIVE/OBJECTIVE:  Maria M Garcia (: 1985) is a 44 y.o. female, patient,here for evaluation of the No chief complaint on file. .    Patient returns today for follow-up of her right shoulder. She underwent right shoulder arthroscopy with rotator cuff repair, arthroscopic biceps tenodesis and extensive debridement on 2022. She has been in a sling. She has been off her pain medication. She denies any numbness tingling erythema or warmth.     Physical Exam    Examination of the operative shoulder reveals that the incisions are healing well, without evidence of drainage, erythema or warmth. There is limited range of motion secondary to discomfort. Strength is 5/5 distally. There is no tenderness at the elbow and wrist. Sensation is intact to light touch distally and there is a brisk capillary refill. No Known Allergies    Current Outpatient Medications   Medication Sig    diclofenac EC (VOLTAREN) 75 mg EC tablet Take 1 Tablet by mouth two (2) times daily (with meals). ibuprofen (MOTRIN) 800 mg tablet Take 1 Tablet by mouth three (3) times daily. (Patient not taking: Reported on 2022)     No current facility-administered medications for this visit. Past Medical History:   Diagnosis Date    Abnormal Papanicolaou smear of cervix     Leep procedure in     Anemia     \"borderline\" no supplement    Knee pain, left 2013    Postpartum depression 2017    after last baby, who is 2 years at this time       Past Surgical History:   Procedure Laterality Date    HX  SECTION  2009    HX DILATION AND CURETTAGE      HX LEEP PROCEDURE      [place]  DELIVERY ONLY      2009 - boy, 10/18/2013 - girl, 10/6/2014 - twins (girl and boy), 2017 - boy       Family History   Problem Relation Age of Onset    Diabetes Mother     Hypertension Mother     Heart Attack Mother     Other Mother          of pancreatitis complications    Diabetes Father     Heart Disease Father     Asthma Brother     Stroke Brother 32        He was incarcerated at the time, possible \"poisoning\" but never had autopsy.     No Known Problems Sister     Cancer Cousin         breast cancer    No Known Problems Sister     No Known Problems Sister        Social History     Socioeconomic History    Marital status: SINGLE     Spouse name: Not on file    Number of children: Not on file    Years of education: Not on file    Highest education level: Not on file   Occupational History    Not on file   Tobacco Use    Smoking status: Never    Smokeless tobacco: Never   Vaping Use    Vaping Use: Never used   Substance and Sexual Activity    Alcohol use: No     Comment: socially    Drug use: No    Sexual activity: Yes     Partners: Male     Birth control/protection: None   Other Topics Concern    Not on file   Social History Narrative    Not on file     Social Determinants of Health     Financial Resource Strain: Not on file   Food Insecurity: Not on file   Transportation Needs: Not on file   Physical Activity: Not on file   Stress: Not on file   Social Connections: Not on file   Intimate Partner Violence: Not on file   Housing Stability: Not on file       Review of Systems    No flowsheet data found. Vitals: There were no vitals taken for this visit. There is no height or weight on file to calculate BMI. An electronic signature was used to authenticate this note.   -- Arthur Ferguson MD

## 2022-09-14 ENCOUNTER — OFFICE VISIT (OUTPATIENT)
Dept: ORTHOPEDIC SURGERY | Age: 37
End: 2022-09-14
Payer: MEDICAID

## 2022-09-14 VITALS — WEIGHT: 293 LBS | HEIGHT: 65 IN | BODY MASS INDEX: 48.82 KG/M2

## 2022-09-14 DIAGNOSIS — S46.011A TRAUMATIC COMPLETE TEAR OF RIGHT ROTATOR CUFF, INITIAL ENCOUNTER: Primary | ICD-10-CM

## 2022-09-14 PROCEDURE — 99024 POSTOP FOLLOW-UP VISIT: CPT | Performed by: ORTHOPAEDIC SURGERY

## 2022-09-14 NOTE — PROGRESS NOTES
ASSESSMENT/PLAN:  Below is the assessment and plan developed based on review of pertinent history, physical exam, labs, studies, and medications. 1. Traumatic complete tear of right rotator cuff, initial encounter      No follow-ups on file. After discussing treatment options, we have decided to proceed with formal physical therapy as well as a home exercise program for rehabilitation of the shoulder. We reviewed active elbow range of motion and postural exercises today in the office. We went over the arthroscopic pictures and removed the stitches during today´s visit. We will continue with ice and elevation of the shoulder to decrease swelling and pain. We will continue to utilize early mobilization and mechanical prophylaxis to reduce the chances of a deep vein thrombosis. We will wean them off any narcotic medications and progress to anti-inflammatories and Tylenol as long as there are no contraindications to these medications. We also discussed the risk and benefits and common side effects of taking these medications at today´s visit. We also had a discussion regarding not driving while on narcotic medications and while impaired from a surgical or medical condition. I will see them back in three weeks time to evaluate their progress. They will call us in the interim if they have any questions or concerns prior to their follow up visit. She presents for continued follow-up status post right shoulder arthroscopy with rotator cuff repair. She has been performing the home exercises that were given to her last visit. At this time we will start her with formal physical therapy to work on range of motion of the shoulder. We advised her to remain in the sling and the pillow and to not do any lifting with the right arm or any movement above shoulder height. We will see her back in 4 weeks to evaluate her progress.     SUBJECTIVE/OBJECTIVE:  Cristiane Corrales (: 1985) is a 44 y.o. female, patient,here for evaluation of the No chief complaint on file. .    Patient returns today for follow-up of her right shoulder. She underwent right shoulder arthroscopy with rotator cuff repair, arthroscopic biceps tenodesis and extensive debridement on 2022. She has been in a sling. She has been off her pain medication. She denies any numbness tingling erythema or warmth. Physical Exam    Examination of the operative shoulder reveals that the incisions are healing well, without evidence of drainage, erythema or warmth. There is limited range of motion secondary to discomfort. Strength is 5/5 distally. There is no tenderness at the elbow and wrist. Sensation is intact to light touch distally and there is a brisk capillary refill. No Known Allergies    Current Outpatient Medications   Medication Sig    diclofenac EC (VOLTAREN) 75 mg EC tablet Take 1 Tablet by mouth two (2) times daily (with meals). ibuprofen (MOTRIN) 800 mg tablet Take 1 Tablet by mouth three (3) times daily. (Patient not taking: Reported on 2022)     No current facility-administered medications for this visit.        Past Medical History:   Diagnosis Date    Abnormal Papanicolaou smear of cervix     Leep procedure in     Anemia     \"borderline\" no supplement    Knee pain, left 2013    Postpartum depression 2017    after last baby, who is 2 years at this time       Past Surgical History:   Procedure Laterality Date    HX  SECTION  2009    HX DILATION AND CURETTAGE      HX LEEP PROCEDURE      [place]      2009 - boy, 10/18/2013 - girl, 10/6/2014 - twins (girl and boy), 2017 - boy       Family History   Problem Relation Age of Onset    Diabetes Mother     Hypertension Mother     Heart Attack Mother     Other Mother          of pancreatitis complications    Diabetes Father     Heart Disease Father     Asthma Brother     Stroke Brother 32        He was incarcerated at the time, possible \"poisoning\" but never had autopsy. No Known Problems Sister     Cancer Cousin         breast cancer    No Known Problems Sister     No Known Problems Sister        Social History     Socioeconomic History    Marital status: SINGLE     Spouse name: Not on file    Number of children: Not on file    Years of education: Not on file    Highest education level: Not on file   Occupational History    Not on file   Tobacco Use    Smoking status: Never    Smokeless tobacco: Never   Vaping Use    Vaping Use: Never used   Substance and Sexual Activity    Alcohol use: No     Comment: socially    Drug use: No    Sexual activity: Yes     Partners: Male     Birth control/protection: None   Other Topics Concern    Not on file   Social History Narrative    Not on file     Social Determinants of Health     Financial Resource Strain: Not on file   Food Insecurity: Not on file   Transportation Needs: Not on file   Physical Activity: Not on file   Stress: Not on file   Social Connections: Not on file   Intimate Partner Violence: Not on file   Housing Stability: Not on file       Review of Systems    No flowsheet data found. Vitals: There were no vitals taken for this visit. There is no height or weight on file to calculate BMI. An electronic signature was used to authenticate this note.   -- Karla Mireles MD

## 2022-09-20 ENCOUNTER — OFFICE VISIT (OUTPATIENT)
Dept: ORTHOPEDIC SURGERY | Age: 37
End: 2022-09-20
Payer: MEDICAID

## 2022-09-20 VITALS — WEIGHT: 293 LBS | BODY MASS INDEX: 48.82 KG/M2 | HEIGHT: 65 IN

## 2022-09-20 DIAGNOSIS — M54.50 CHRONIC BILATERAL LOW BACK PAIN WITHOUT SCIATICA: Primary | ICD-10-CM

## 2022-09-20 DIAGNOSIS — G89.29 CHRONIC BILATERAL LOW BACK PAIN WITHOUT SCIATICA: Primary | ICD-10-CM

## 2022-09-20 PROCEDURE — 99214 OFFICE O/P EST MOD 30 MIN: CPT | Performed by: STUDENT IN AN ORGANIZED HEALTH CARE EDUCATION/TRAINING PROGRAM

## 2022-09-20 NOTE — PROGRESS NOTES
1. Have you been to the ER, urgent care clinic since your last visit? Hospitalized since your last visit? No    2. Have you seen or consulted any other health care providers outside of the [address] [place] [address] since your last visit? Include any pap smears or colon screening.  No  Chief Complaint   Patient presents with    Back Pain

## 2022-10-11 NOTE — PROGRESS NOTES
ASSESSMENT/PLAN:  Below is the assessment and plan developed based on review of pertinent history, physical exam, labs, studies, and medications. 1. Traumatic complete tear of right rotator cuff, initial encounter      No follow-ups on file. After discussing treatment options, we have decided to proceed with formal physical therapy as well as a home exercise program for rehabilitation of the shoulder. We reviewed active elbow range of motion and postural exercises today in the office. We went over the arthroscopic pictures and removed the stitches during today´s visit. We will continue with ice and elevation of the shoulder to decrease swelling and pain. We will continue to utilize early mobilization and mechanical prophylaxis to reduce the chances of a deep vein thrombosis. We will wean them off any narcotic medications and progress to anti-inflammatories and Tylenol as long as there are no contraindications to these medications. We also discussed the risk and benefits and common side effects of taking these medications at today´s visit. We also had a discussion regarding not driving while on narcotic medications and while impaired from a surgical or medical condition. I will see them back in three weeks time to evaluate their progress. They will call us in the interim if they have any questions or concerns prior to their follow up visit. She presents for continued follow-up status post right shoulder arthroscopy with rotator cuff repair. She has been performing the home exercises that were given to her last visit in addition to going to physical therapy 2 times per week. At this time we will discontinue her abduction pillow. We advised that she should remain in her sling especially when out of the house. We emphasized that she should continue to work on improving her range of motion of the shoulder as well as her elbow.     SUBJECTIVE/OBJECTIVE:  Dayna Antonio (: 1985) is a 44 y.o. female, patient,here for evaluation of the No chief complaint on file. .    Patient returns today for follow-up of her right shoulder. She underwent right shoulder arthroscopy with rotator cuff repair, arthroscopic biceps tenodesis and extensive debridement on 2022. She has been in a sling. She has been off her pain medication. She denies any numbness tingling erythema or warmth. Physical Exam    Examination of the operative shoulder reveals that the incisions are healing well, without evidence of drainage, erythema or warmth. There is limited range of motion secondary to discomfort. Strength is 5/5 distally. There is no tenderness at the elbow and wrist. Sensation is intact to light touch distally and there is a brisk capillary refill. No Known Allergies    Current Outpatient Medications   Medication Sig    diclofenac EC (VOLTAREN) 75 mg EC tablet Take 1 Tablet by mouth two (2) times daily (with meals). ibuprofen (MOTRIN) 800 mg tablet Take 1 Tablet by mouth three (3) times daily. (Patient not taking: Reported on 2022)     No current facility-administered medications for this visit.        Past Medical History:   Diagnosis Date    Abnormal Papanicolaou smear of cervix     Leep procedure in     Anemia     \"borderline\" no supplement    Knee pain, left 2013    Postpartum depression 2017    after last baby, who is 2 years at this time       Past Surgical History:   Procedure Laterality Date    HX  SECTION  2009    HX DILATION AND CURETTAGE      H[place] LEEP PROCEDURE      [place]      2009 - boy, 10/18/2013 - girl, 10/6/2014 - twins (girl and boy), 2017 - boy       Family History   Problem Relation Age of Onset    Diabetes Mother     Hypertension Mother     Heart Attack Mother     Other Mother          of pancreatitis complications    Diabetes Father     Heart Disease Father     Asthma Brother     Stroke Brother 32        He was incarcerated at the time, possible \"poisoning\" but never had autopsy. No Known Problems Sister     Cancer Cousin         breast cancer    No Known Problems Sister     No Known Problems Sister        Social History     Socioeconomic History    Marital status: SINGLE     Spouse name: Not on file    Number of children: Not on file    Years of education: Not on file    Highest education level: Not on file   Occupational History    Not on file   Tobacco Use    Smoking status: Never    Smokeless tobacco: Never   Vaping Use    Vaping Use: Never used   Substance and Sexual Activity    Alcohol use: No     Comment: socially    Drug use: No    Sexual activity: Yes     Partners: Male     Birth control/protection: None   Other Topics Concern    Not on file   Social History Narrative    Not on file     Social Determinants of Health     Financial Resource Strain: Not on file   Food Insecurity: Not on file   Transportation Needs: Not on file   Physical Activity: Not on file   Stress: Not on file   Social Connections: Not on file   Intimate Partner Violence: Not on file   Housing Stability: Not on file       Review of Systems    No flowsheet data found. Vitals: There were no vitals taken for this visit. There is no height or weight on file to calculate BMI. An electronic signature was used to authenticate this note.   -- Guerda Miles MD

## 2022-10-12 ENCOUNTER — OFFICE VISIT (OUTPATIENT)
Dept: ORTHOPEDIC SURGERY | Age: 37
End: 2022-10-12
Payer: MEDICAID

## 2022-10-12 VITALS — HEIGHT: 65 IN | WEIGHT: 293 LBS | BODY MASS INDEX: 48.82 KG/M2

## 2022-10-12 DIAGNOSIS — S46.011A TRAUMATIC COMPLETE TEAR OF RIGHT ROTATOR CUFF, INITIAL ENCOUNTER: Primary | ICD-10-CM

## 2022-10-12 PROCEDURE — 99024 POSTOP FOLLOW-UP VISIT: CPT | Performed by: ORTHOPAEDIC SURGERY

## 2022-11-17 ENCOUNTER — OFFICE VISIT (OUTPATIENT)
Dept: ORTHOPEDIC SURGERY | Age: 37
End: 2022-11-17
Payer: MEDICAID

## 2022-11-17 VITALS — BODY MASS INDEX: 48.82 KG/M2 | WEIGHT: 293 LBS | HEIGHT: 65 IN

## 2022-11-17 DIAGNOSIS — M51.36 DDD (DEGENERATIVE DISC DISEASE), LUMBAR: ICD-10-CM

## 2022-11-17 DIAGNOSIS — S46.011D TRAUMATIC COMPLETE TEAR OF RIGHT ROTATOR CUFF, SUBSEQUENT ENCOUNTER: ICD-10-CM

## 2022-11-17 DIAGNOSIS — M54.50 CHRONIC BILATERAL LOW BACK PAIN WITHOUT SCIATICA: Primary | ICD-10-CM

## 2022-11-17 DIAGNOSIS — M25.519 SHOULDER PAIN, UNSPECIFIED CHRONICITY, UNSPECIFIED LATERALITY: ICD-10-CM

## 2022-11-17 DIAGNOSIS — G89.29 CHRONIC BILATERAL LOW BACK PAIN WITHOUT SCIATICA: Primary | ICD-10-CM

## 2022-11-17 DIAGNOSIS — M79.671 RIGHT FOOT PAIN: ICD-10-CM

## 2022-11-17 PROCEDURE — 99213 OFFICE O/P EST LOW 20 MIN: CPT | Performed by: STUDENT IN AN ORGANIZED HEALTH CARE EDUCATION/TRAINING PROGRAM

## 2022-11-17 NOTE — PROGRESS NOTES
Coral Leach (: 1985) is a 44 y.o. female here for evaluation of the following chief complaint(s):  Back Pain and Follow-up (Didn't go to PT.)       ASSESSMENT/PLAN:  Below is the assessment and plan developed based on review of pertinent history, physical exam, labs, studies, and medications. 1. Chronic bilateral low back pain without sciatica  -     REFERRAL TO PHYSICAL THERAPY; Future  2. DDD (degenerative disc disease), lumbar  -     REFERRAL TO PHYSICAL THERAPY; Future  3. Shoulder pain, unspecified chronicity, unspecified laterality  -     REFERRAL TO PHYSICAL THERAPY; Future  4. Traumatic complete tear of right rotator cuff, subsequent encounter  -     REFERRAL TO PHYSICAL THERAPY; Future  5. Right foot pain  -     XR FOOT RT MIN 3 V; Future  -     REFERRAL TO PHYSICAL THERAPY; Future    Return in about 6 weeks (around 2022) for Follow up after PT. I will attempt to combine her right shoulder and lumbar spine physical therapy into 1 prescription so that she can perform both at the same time. SUBJECTIVE/OBJECTIVE:  HPI  Patient is a pleasant 76-year-old female who is well-known to the spine service. She is status post right shoulder rotator cuff repair surgery. She is currently in physical therapy for this and is progressing well. Unfortunately due to insurance purposes she was unable to initiate physical therapy for her low back due to the fact that there were 2 separate prescriptions. She is also complaining of right medial foot and great toe pain today, atraumatic. She is requesting x-rays to be obtained to rule out injury or arthritis, which is reasonable.     Review of Systems  See above HPI and prior clinic notes for full ROS     Ht 5' 5\" (1.651 m)   Wt 300 lb (136.1 kg)   BMI 49.92 kg/m²    Physical Exam  No interval change in PE, grossly motor/sensory intact, no new neurological deficits    IMAGING:  XR Results (most recent):  Results from Appointment encounter on 11/17/22    XR FOOT RT MIN 3 V    Narrative  3 view right foot with mild to moderate talonavicular osteoarthritis with preserved joint space and navicular sided subchondral sclerosis. No obvious bony lesions or fractures throughout. An electronic signature was used to authenticate this note.   -- Estefany Sung, DO

## 2022-11-22 ENCOUNTER — OFFICE VISIT (OUTPATIENT)
Dept: ORTHOPEDIC SURGERY | Age: 37
End: 2022-11-22
Payer: MEDICAID

## 2022-11-22 VITALS — HEIGHT: 65 IN | WEIGHT: 293 LBS | BODY MASS INDEX: 48.82 KG/M2

## 2022-11-22 DIAGNOSIS — E66.01 MORBID OBESITY WITH BMI OF 45.0-49.9, ADULT (HCC): ICD-10-CM

## 2022-11-22 DIAGNOSIS — M79.672 BILATERAL FOOT PAIN: ICD-10-CM

## 2022-11-22 DIAGNOSIS — M21.41 BILATERAL PES PLANUS: ICD-10-CM

## 2022-11-22 DIAGNOSIS — M21.42 BILATERAL PES PLANUS: ICD-10-CM

## 2022-11-22 DIAGNOSIS — M19.079 ARTHRITIS OF MIDFOOT: Primary | ICD-10-CM

## 2022-11-22 DIAGNOSIS — M79.671 BILATERAL FOOT PAIN: ICD-10-CM

## 2022-11-22 PROCEDURE — 99213 OFFICE O/P EST LOW 20 MIN: CPT | Performed by: ORTHOPAEDIC SURGERY

## 2022-11-22 RX ORDER — METHYLPREDNISOLONE 4 MG/1
TABLET ORAL
Qty: 1 DOSE PACK | Refills: 0 | Status: SHIPPED | OUTPATIENT
Start: 2022-11-22 | End: 2022-12-04

## 2022-11-22 NOTE — LETTER
11/28/2022    Patient: Victor Hugo Lubin   YOB: 1985   Date of Visit: 11/22/2022     MAURICE Gilliam [address]  Via In Basket    Dear Bre العلي PA-C,      Thank you for referring Ms. Victor Hugo Lubin to [facility] for evaluation. My notes for this consultation are attached. If you have questions, please do not hesitate to call me. I look forward to following your patient along with you.       Sincerely,    Becki Casillas MD

## 2022-11-22 NOTE — PROGRESS NOTES
Sharee Caballero (: 1985) is a 44 y.o. female, patient,here for evaluation of the following   Chief Complaint   Patient presents with    Foot Pain        ASSESSMENT/PLAN:  Below is the assessment and plan developed based on review of pertinent history, physical exam, labs, studies, and medications. 1. Arthritis of midfoot  -     methylPREDNISolone (MEDROL DOSEPACK) 4 mg tablet; Take 1 Tablet by mouth Specific Days and Specific Times for 6 days, THEN 1 Tablet Specific Days and Specific Times for 6 days. As instructed on packet, Normal, Disp-1 Dose Pack, R-0  -     REFERRAL TO PHYSICAL THERAPY  2. Bilateral foot pain  -     XR STANDING FOOT BI COMP 3 V; Future  -     methylPREDNISolone (MEDROL DOSEPACK) 4 mg tablet; Take 1 Tablet by mouth Specific Days and Specific Times for 6 days, THEN 1 Tablet Specific Days and Specific Times for 6 days. As instructed on packet, Normal, Disp-1 Dose Pack, R-0  -     REFERRAL TO PHYSICAL THERAPY  3. Bilateral pes planus  -     REFERRAL TO PHYSICAL THERAPY  4. Morbid obesity with BMI of 45.0-49.9, adult ([facility] [address]  -     REFERRAL TO PHYSICAL THERAPY      Patient is informed of findings on exam and x-rays, she has midfoot arthritis right more than left involving the navicular cuneiform and midtarsal joints, related to degenerative arthritis of the midfoot and probably hindfoot joints, due to longstanding pes planus foot position and current obesity. The recommended appropriate shoe wear and insoles, stretching program as demonstrated today in clinic, provided home exercises to do but also recommended physical therapy so referral is made. I provided Medrol pack for anti-inflammatory treatment. No current surgical indications, recommend appropriate shoe wear and insoles, anti-inflammatory medications and weight loss. Return if symptoms worsen or fail to improve.       No Known Allergies    Current Outpatient Medications   Medication Sig    methylPREDNISolone (MEDROL DOSEPACK) 4 mg tablet Take 1 Tablet by mouth Specific Days and Specific Times for 6 days, THEN 1 Tablet Specific Days and Specific Times for 6 days. As instructed on packet    erythromycin (ILOTYCIN) ophthalmic ointment PLEASE PLACE 1/2 INCH RIBBON (1.25 CM) TO YOUR R EYE 4 TIMES PER DAY (Patient not taking: No sig reported)    prednisoLONE acetate (PRED FORTE) 1 % ophthalmic suspension INSTILL 1 DROP INTO RIGHT EYE OPHTHALMIC 4 TIMES A DAY FOR 1WK THEN TWICE A DAY FOR 1WK 14 DAYS (Patient not taking: No sig reported)    diclofenac EC (VOLTAREN) 75 mg EC tablet Take 1 Tablet by mouth two (2) times daily (with meals). (Patient not taking: No sig reported)    ibuprofen (MOTRIN) 800 mg tablet Take 1 Tablet by mouth three (3) times daily. No current facility-administered medications for this visit. Past Medical History:   Diagnosis Date    Abnormal Papanicolaou smear of cervix     Leep procedure in     Anemia     \"borderline\" no supplement    Knee pain, left 2013    Postpartum depression 2017    after last baby, who is 2 years at this time       Past Surgical History:   Procedure Laterality Date    HX  SECTION  2009    HX DILATION AND CURETTAGE      H[place] LEEP PROCEDURE      [place]      2009 - boy, 10/18/2013 - girl, 10/6/2014 - twins (girl and boy), 2017 - boy       Family History   Problem Relation Age of Onset    Diabetes Mother     Hypertension Mother     Heart Attack Mother     Other Mother          of pancreatitis complications    Diabetes Father     Heart Disease Father     Asthma Brother     Stroke Brother 32        He was incarcerated at the time, possible \"poisoning\" but never had autopsy.     No Known Problems Sister     Cancer Cousin         breast cancer    No Known Problems Sister     No Known Problems Sister        Social History     Socioeconomic History    Marital status: SINGLE     Spouse name: Not on file Number of children: Not on file    Years of education: Not on file    Highest education level: Not on file   Occupational History    Not on file   Tobacco Use    Smoking status: Never    Smokeless tobacco: Never   Vaping Use    Vaping Use: Never used   Substance and Sexual Activity    Alcohol use: No     Comment: socially    Drug use: No    Sexual activity: Yes     Partners: Male     Birth control/protection: None   Other Topics Concern    Not on file   Social History Narrative    Not on file     Social Determinants of Health     Financial Resource Strain: Not on file   Food Insecurity: Not on file   Transportation Needs: Not on file   Physical Activity: Not on file   Stress: Not on file   Social Connections: Not on file   Intimate Partner Violence: Not on file   Housing Stability: Not on file           Vitals:  Ht 5' 5\" (1.651 m)   Wt 300 lb (136.1 kg)   BMI 49.92 kg/m²    Body mass index is 49.92 kg/m². SUBJECTIVE:  Victor Hugo Lubin (: 1985)   New patient presents today with complaint of bilateral foot pain at the right is worse than the left. States she had a rotator cuff surgery on 2022 and while she was going through physical therapy program for this problem, she started having pain at both feet. She noticed the swelling that extends into the toes and she has great toe pain and the entire foot hurts. She describes this ongoing for the past 4 months, pain and swelling that started after shoulder surgery in 2022. She describes this as extremely severe, stabbing and throbbing pain. The pain is constant and interferes with sleep, there is swelling and tingling sensation. Standing and walking make it worse. She has tried hot water soaking and nothing else. She has been seen by other providers for this problem on 2022. She is not diabetic, non-smoker. She is currently going through physical therapy program for her shoulder.         OBJECTIVE EXAM:     Visit Vitals  Ht 5' 5\" (1.651 m)   Wt 300 lb (136.1 kg)   BMI 49.92 kg/m²       Appearance: Morbidly obese female, alert, well appearing and pleasant patient who is in no distress, oriented to person, place/time, and who follows commands. This patient is accompanied in the       office by her  self. Psychiatric: Affect and mood are appropriate. No dementia noted on examination  Musculoskeletal:  LOCATION: Diffuse tenderness midfoot and forefoot foot - both      Integumentary: No rashes, skin patches, wounds, or abrasions to the right or left legs       Warm and normal color. No regions of expressible drainage. Gait: Normal      Tenderness: No tenderness        Motor/Strength/Tone Exam: Normal       Sensory Exam:   Intact Normal Sensation to ankle/foot      Stability Testing: No anterolateral or varus instability of the Ankle or Subtalar Joints               No peroneal tendon instability noted      ROM: Normal ROM noted to ankle/foot      Contractures: No Achilles or Gastrocnemius Contractures      Calf tenderness: Absent for calf or gastrocnemius muscle regions       Soft, supple, non tender, non taut lower extremity compartment  Alignment:      NEUTRAL Hindfoot,         none Metatarsus Adductus Metatarsus   Wounds/Abrasions:    None present  Extremities:   No embolic phenomena to the toes          No significant edema to the foot and or toes.         Lower extremities are warm and appear well perfused    DVT: No evidence of DVT seen on examination at this time     No calf swelling, no tenderness to calf muscles  Lymphatic:  No Evidence of Lymphedema  Vascular: Medial Border of Tibia Region: Edema is not present         Pulses: Dorsalis Pedis &  Posterior Tibial Pulses : Palpable yes        Varicosities Lower Limbs :  None  noted  Neuro: Negative bilateral Straight leg raise (seated position)    See Musculoskeletal section for pertinent individual extremity examination    No abnormal hand/wrist, foot/ankle, or facial/neck tremors. Lower Extremity/Ankle/Foot:  Mild antalgic gait, satisfactory weightbearing stance. Bilateral lower extremity/ankle: There is valgus position to the right knee more than the left knee, some tenderness along the medial joint line right knee, the tibia and fibula nontender, ankle is nontender and she has full active and passive range of motion intact for dorsiflexion plantarflexion when knee flexed, with knee extended there is a positive Silfverskiold test.  Achilles tendon intact with a negative Fatima test, negative ankle squeeze test.  Negative tenderness to palpation calf and there is no swelling. Bilateral foot: Mild to moderate hallux valgus but mostly nontender at forefoot without swelling. First MTP joint range of motion satisfactory. There is pes planus foot position, less valgus, tenderness to palpation midfoot and hindfoot joints, minimal swelling more at the right than left, no ecchymosis, erythema, fluctuance, the metatarsals nontender. Contralateral lower extremity/ankle /foot exam:  Nontender, no swelling ligaments grossly stable. Normal weightbearing stance. Neurovascular exam is intact for light touch sensation, dorsalis pedis pulse palpable, EHL/FHL 5/5. Imaging:    XR Results (most recent):  Results from Appointment encounter on 11/22/22    XR STANDING FOOT BI COMP 3 V    Narrative  Bilateral foot AP, lateral and oblique standing x-rays show pes planus foot with midfoot arthritis involving the midtarsal joints and at the navicular cuneiform joints, there is mild to moderate hallux valgus. Satisfactory bone density, no lesions. An electronic signature was used to authenticate this note.   -- Rafa Johansen MD

## 2022-11-22 NOTE — PROGRESS NOTES
ASSESSMENT/PLAN:  Below is the assessment and plan developed based on review of pertinent history, physical exam, labs, studies, and medications. 1. Traumatic complete tear of right rotator cuff, initial encounter      No follow-ups on file. After discussing treatment options, we have decided to proceed with formal physical therapy as well as a home exercise program for rehabilitation of the shoulder. We reviewed active elbow range of motion and postural exercises today in the office. We went over the arthroscopic pictures and removed the stitches during today´s visit. We will continue with ice and elevation of the shoulder to decrease swelling and pain. We will continue to utilize early mobilization and mechanical prophylaxis to reduce the chances of a deep vein thrombosis. We will wean them off any narcotic medications and progress to anti-inflammatories and Tylenol as long as there are no contraindications to these medications. We also discussed the risk and benefits and common side effects of taking these medications at today´s visit. We also had a discussion regarding not driving while on narcotic medications and while impaired from a surgical or medical condition. I will see them back in three weeks time to evaluate their progress. They will call us in the interim if they have any questions or concerns prior to their follow up visit. She presents for continued follow-up status post right shoulder arthroscopy with rotator cuff repair. She has been performing the home exercises that were given to her last visit in addition to going to physical therapy 2 times per week. She may discontinue the sling use. Recommend continuing with physical therapy right shoulder continue to push with her range of motion. We will see her back in 4 weeks for motion evaluation. SUBJECTIVE/OBJECTIVE:  Libertad Sinha (: 1985) is a 44 y.o. female, patient,here for evaluation of the No chief complaint on file. Miracle Odom Patient returns today for follow-up of her right shoulder. She underwent right shoulder arthroscopy with rotator cuff repair, arthroscopic biceps tenodesis and extensive debridement on 2022. She has been doing out of her sling and typically only wearing it when she is out in public. Rebecca Lisa She has been off her pain medication. She denies any numbness tingling erythema or warmth. Physical Exam    Examination of the operative shoulder reveals that the incisions are healing well, without evidence of drainage, erythema or warmth. There is limited range of motion. Strength is 5/5 distally. There is no tenderness at the elbow and wrist. Sensation is intact to light touch distally and there is a brisk capillary refill. No Known Allergies    Current Outpatient Medications   Medication Sig    diclofenac EC (VOLTAREN) 75 mg EC tablet Take 1 Tablet by mouth two (2) times daily (with meals). ibuprofen (MOTRIN) 800 mg tablet Take 1 Tablet by mouth three (3) times daily. (Patient not taking: Reported on 2022)     No current facility-administered medications for this visit.        Past Medical History:   Diagnosis Date    Abnormal Papanicolaou smear of cervix     Leep procedure in     Anemia     \"borderline\" no supplement    Knee pain, left 2013    Postpartum depression 2017    after last baby, who is 2 years at this time       Past Surgical History:   Procedure Laterality Date    HX  SECTION  2009    HX DILATION AND CURETTAGE      HX LEEP PROCEDURE      [place]  DELIVERY ONLY      2009 - boy, 10/18/2013 - girl, 10/6/2014 - twins (girl and boy), 2017 - boy       Family History   Problem Relation Age of Onset    Diabetes Mother     Hypertension Mother     Heart Attack Mother     Other Mother          of pancreatitis complications    Diabetes Father     Heart Disease Father     Asthma Brother     Stroke Brother 32        He was incarcerated at the time, possible \"poisoning\" but never had autopsy. No Known Problems Sister     Cancer Cousin         breast cancer    No Known Problems Sister     No Known Problems Sister        Social History     Socioeconomic History    Marital status: SINGLE     Spouse name: Not on file    Number of children: Not on file    Years of education: Not on file    Highest education level: Not on file   Occupational History    Not on file   Tobacco Use    Smoking status: Never    Smokeless tobacco: Never   Vaping Use    Vaping Use: Never used   Substance and Sexual Activity    Alcohol use: No     Comment: socially    Drug use: No    Sexual activity: Yes     Partners: Male     Birth control/protection: None   Other Topics Concern    Not on file   Social History Narrative    Not on file     Social Determinants of Health     Financial Resource Strain: Not on file   Food Insecurity: Not on file   Transportation Needs: Not on file   Physical Activity: Not on file   Stress: Not on file   Social Connections: Not on file   Intimate Partner Violence: Not on file   Housing Stability: Not on file       Review of Systems    No flowsheet data found. Vitals: There were no vitals taken for this visit. There is no height or weight on file to calculate BMI. An electronic signature was used to authenticate this note.   -- Norma Bowers MD

## 2022-11-23 ENCOUNTER — OFFICE VISIT (OUTPATIENT)
Dept: ORTHOPEDIC SURGERY | Age: 37
End: 2022-11-23
Payer: MEDICAID

## 2022-11-23 VITALS — BODY MASS INDEX: 48.82 KG/M2 | WEIGHT: 293 LBS | HEIGHT: 65 IN

## 2022-11-23 DIAGNOSIS — S46.011A TRAUMATIC COMPLETE TEAR OF RIGHT ROTATOR CUFF, INITIAL ENCOUNTER: Primary | ICD-10-CM

## 2022-11-23 PROCEDURE — 99024 POSTOP FOLLOW-UP VISIT: CPT | Performed by: ORTHOPAEDIC SURGERY

## 2022-12-05 DIAGNOSIS — M25.519 SHOULDER PAIN, UNSPECIFIED CHRONICITY, UNSPECIFIED LATERALITY: ICD-10-CM

## 2022-12-05 DIAGNOSIS — M79.672 BILATERAL FOOT PAIN: ICD-10-CM

## 2022-12-05 DIAGNOSIS — S46.011D TRAUMATIC COMPLETE TEAR OF RIGHT ROTATOR CUFF, SUBSEQUENT ENCOUNTER: ICD-10-CM

## 2022-12-05 DIAGNOSIS — S46.011A TRAUMATIC COMPLETE TEAR OF RIGHT ROTATOR CUFF, INITIAL ENCOUNTER: Primary | ICD-10-CM

## 2022-12-05 DIAGNOSIS — M79.671 BILATERAL FOOT PAIN: ICD-10-CM

## 2022-12-05 DIAGNOSIS — E66.01 MORBID OBESITY WITH BMI OF 45.0-49.9, ADULT (HCC): ICD-10-CM

## 2022-12-05 DIAGNOSIS — M51.36 DDD (DEGENERATIVE DISC DISEASE), LUMBAR: ICD-10-CM

## 2022-12-05 DIAGNOSIS — G89.29 CHRONIC BILATERAL LOW BACK PAIN WITHOUT SCIATICA: ICD-10-CM

## 2022-12-05 DIAGNOSIS — M79.671 RIGHT FOOT PAIN: ICD-10-CM

## 2022-12-05 DIAGNOSIS — M54.50 CHRONIC BILATERAL LOW BACK PAIN WITHOUT SCIATICA: ICD-10-CM

## 2022-12-05 DIAGNOSIS — M19.079 ARTHRITIS OF MIDFOOT: ICD-10-CM

## 2023-01-03 ENCOUNTER — OFFICE VISIT (OUTPATIENT)
Dept: ORTHOPEDIC SURGERY | Age: 38
End: 2023-01-03
Payer: MEDICAID

## 2023-01-03 VITALS — HEIGHT: 65 IN | WEIGHT: 293 LBS | BODY MASS INDEX: 48.82 KG/M2

## 2023-01-03 DIAGNOSIS — E66.01 MORBID OBESITY WITH BMI OF 45.0-49.9, ADULT (HCC): ICD-10-CM

## 2023-01-03 DIAGNOSIS — M47.816 LUMBAR SPONDYLOSIS: ICD-10-CM

## 2023-01-03 DIAGNOSIS — M51.36 DDD (DEGENERATIVE DISC DISEASE), LUMBAR: ICD-10-CM

## 2023-01-03 DIAGNOSIS — M19.079 ARTHRITIS OF MIDFOOT: ICD-10-CM

## 2023-01-03 DIAGNOSIS — S46.011D TRAUMATIC COMPLETE TEAR OF RIGHT ROTATOR CUFF, SUBSEQUENT ENCOUNTER: ICD-10-CM

## 2023-01-03 DIAGNOSIS — G89.29 CHRONIC BILATERAL LOW BACK PAIN WITHOUT SCIATICA: Primary | ICD-10-CM

## 2023-01-03 DIAGNOSIS — M54.50 CHRONIC BILATERAL LOW BACK PAIN WITHOUT SCIATICA: Primary | ICD-10-CM

## 2023-01-03 PROCEDURE — 99213 OFFICE O/P EST LOW 20 MIN: CPT | Performed by: STUDENT IN AN ORGANIZED HEALTH CARE EDUCATION/TRAINING PROGRAM

## 2023-01-03 RX ORDER — MELOXICAM 15 MG/1
TABLET ORAL
COMMUNITY
Start: 2022-12-30

## 2023-01-03 NOTE — PROGRESS NOTES
1. Have you been to the ER, urgent care clinic since your last visit? Hospitalized since your last visit? No    2. Have you seen or consulted any other health care providers outside of the [address] [place] [address] since your last visit? Include any pap smears or colon screening. No  Chief Complaint   Patient presents with    Follow-up     Patient started PT last Thursday.     Back Pain

## 2023-01-04 NOTE — PROGRESS NOTES
Yandy Andrade (: 1985) is a 44 y.o. female here for evaluation of the following chief complaint(s):  Follow-up (Patient started PT last Thursday.) and Back Pain       ASSESSMENT/PLAN:  Below is the assessment and plan developed based on review of pertinent history, physical exam, labs, studies, and medications. 1. Chronic bilateral low back pain without sciatica  2. Morbid obesity with BMI of 45.0-49.9, adult ([address]  3. DDD (degenerative disc disease), lumbar  -     MRI LUMB SPINE WO CONT; Future  4. Arthritis of midfoot  5. Traumatic complete tear of right rotator cuff, subsequent encounter  6. Lumbar spondylosis  -     MRI LUMB SPINE WO CONT; Future    Return in about 4 weeks (around 2023) for MRI review. Patient still in pain despite conservative measures. Follow up after MRI lumbar. SUBJECTIVE/OBJECTIVE:  HPI  Patient still having daily mechanical LBP despite conservative measures, namely PT and NSAIDs. She'd like to move forward with next steps. In the interim she did see Dr. Mignon Minaya regarding her right midfoot arthritis. She wasn't pleased with her treatment and got a second opinion. The 2nd opinion surgeon ended up giving her a steroid dose pack and a boot and her symptoms improved. Review of Systems  See above HPI and prior clinic notes for full ROS     Ht 5' 5\" (1.651 m)   Wt 300 lb (136.1 kg)   BMI 49.92 kg/m²    Physical Exam  No interval change in PE, grossly motor/sensory intact, no new neurological deficits    IMAGING:  None new. An electronic signature was used to authenticate this note.   -- Marcelo Garvin DO

## 2023-01-11 ENCOUNTER — OFFICE VISIT (OUTPATIENT)
Dept: ORTHOPEDIC SURGERY | Age: 38
End: 2023-01-11

## 2023-01-11 VITALS — WEIGHT: 293 LBS | BODY MASS INDEX: 48.82 KG/M2 | HEIGHT: 65 IN

## 2023-01-11 DIAGNOSIS — M75.01 ADHESIVE CAPSULITIS OF RIGHT SHOULDER: Primary | ICD-10-CM

## 2023-01-11 PROCEDURE — 99213 OFFICE O/P EST LOW 20 MIN: CPT | Performed by: ORTHOPAEDIC SURGERY

## 2023-01-11 NOTE — PROGRESS NOTES
ASSESSMENT/PLAN:  Below is the assessment and plan developed based on review of pertinent history, physical exam, labs, studies, and medications. 1. Traumatic complete tear of right rotator cuff, initial encounter      In discussion with the patient, we considered the numerus possible diagnoses that could be contributing to their present symptoms. We also deliberated on the extensive management options that must be considered to treat their current condition. We reviewed their accessible prior medical records, diagnostic tests, and current health and employment information. We considered how these symptoms were affecting the patient´s activities of daily living as well as employment and fitness activities. The patient had various questions regarding the possible risks, benefits, complications, morbidity and mortality regarding their diagnosis and treatment options. The patients´ comorbidities were considered, and I advocated that they consider maximizing lifestyle modification through nutrition and exercise to aid in addressing their symptoms. Shared decision making yielded an understanding to move forward with conservation treatment preferences. The patient expressed understanding that if conservative management fails to alleviate the present symptoms they will return to office for re-evaluation and consideration of additional diagnostic tests and potential surgical options. In the interim, we have recommended ice, elevation, and take prescription anti-inflammatory medications along with a physician directed home exercise program. We discussed the risks and common side effects of anti-inflammatory medications and instructed the patient to discontinue the medication and contact us if they experienced any side effects. The patient was encouraged to discuss the possible side effects with their family physician or pharmacist prior to initiating any new medications.     We discussed the fact that many of the recommended treatment options presented are significantly limited by the patient´s social determinants of health. We also reviewed the circumstances surrounding the environment that they live and work which affect a wide range of health risk. We considered the limited access to appropriate educational resources regarding proper nutrition and exercise as well as the economic and social support necessary to maintain health and wellbeing. Given that the patient's symptoms are increasing in frequency and duration we have decided to prescribe physical therapy. We talked about the fact that the goal of physical therapy is for the therapist to assist in developing a program to help return the patient to full strength, function and mobility and decrease pain. We also discussed that the therapist may combine several techniques to help decrease pain. These include but are not limited to stretching, balance exercises, strength training, massage, cold and heat therapy, and electrical stimulation. Although, physical therapy is generally safe, we went over the potential risks to include the worsening of pre-existing conditions, continued pain and no improvement in flexibility, mobility, and strength. We will have the patient follow up after physical therapy to closely monitor their progress. We talked about following up sooner if therapy is not progressing on a weekly basis. We will keep her in physical therapy until she regains her full range of motion. I will see her back in 8 weeks time to evaluate her progress. SUBJECTIVE/OBJECTIVE:  Elizabeth Gorman (: 1985) is a 44 y.o. female, patient,here for evaluation of the No chief complaint on file. .    Patient returns today for follow-up of her right shoulder. She underwent right shoulder arthroscopy with rotator cuff repair, arthroscopic biceps tenodesis and extensive debridement on 2022.   She has been doing out of her sling and typically only wearing it when she is out in public. Constance Bloodgood She has been off her pain medication. She denies any numbness tingling erythema or warmth. Physical Exam    Examination of the operative shoulder reveals that the incisions are healing well, without evidence of drainage, erythema or warmth. There is limited range of motion. Strength is 5/5 distally. There is no tenderness at the elbow and wrist. Sensation is intact to light touch distally and there is a brisk capillary refill. No Known Allergies    Current Outpatient Medications   Medication Sig    diclofenac EC (VOLTAREN) 75 mg EC tablet Take 1 Tablet by mouth two (2) times daily (with meals). ibuprofen (MOTRIN) 800 mg tablet Take 1 Tablet by mouth three (3) times daily. (Patient not taking: Reported on 2022)     No current facility-administered medications for this visit. Past Medical History:   Diagnosis Date    Abnormal Papanicolaou smear of cervix     Leep procedure in     Anemia     \"borderline\" no supplement    Knee pain, left 2013    Postpartum depression 2017    after last baby, who is 2 years at this time       Past Surgical History:   Procedure Laterality Date    HX  SECTION  2009    HX DILATION AND CURETTAGE      HX LEEP PROCEDURE      [place]  DELIVERY ONLY      2009 - boy, 10/18/2013 - girl, 10/6/2014 - twins (girl and boy), 2017 - boy       Family History   Problem Relation Age of Onset    Diabetes Mother     Hypertension Mother     Heart Attack Mother     Other Mother          of pancreatitis complications    Diabetes Father     Heart Disease Father     Asthma Brother     Stroke Brother 32        He was incarcerated at the time, possible \"poisoning\" but never had autopsy.     No Known Problems Sister     Cancer Cousin         breast cancer    No Known Problems Sister     No Known Problems Sister        Social History     Socioeconomic History    Marital status: SINGLE     Spouse name: Not on file Number of children: Not on file    Years of education: Not on file    Highest education level: Not on file   Occupational History    Not on file   Tobacco Use    Smoking status: Never    Smokeless tobacco: Never   Vaping Use    Vaping Use: Never used   Substance and Sexual Activity    Alcohol use: No     Comment: socially    Drug use: No    Sexual activity: Yes     Partners: Male     Birth control/protection: None   Other Topics Concern    Not on file   Social History Narrative    Not on file     Social Determinants of Health     Financial Resource Strain: Not on file   Food Insecurity: Not on file   Transportation Needs: Not on file   Physical Activity: Not on file   Stress: Not on file   Social Connections: Not on file   Intimate Partner Violence: Not on file   Housing Stability: Not on file       Review of Systems    No flowsheet data found. Vitals: There were no vitals taken for this visit. There is no height or weight on file to calculate BMI. An electronic signature was used to authenticate this note.   -- Joy Denton MD

## 2023-01-13 ENCOUNTER — OFFICE VISIT (OUTPATIENT)
Dept: SLEEP MEDICINE | Age: 38
End: 2023-01-13
Payer: MEDICAID

## 2023-01-13 VITALS
HEIGHT: 65 IN | HEART RATE: 79 BPM | SYSTOLIC BLOOD PRESSURE: 136 MMHG | BODY MASS INDEX: 48.82 KG/M2 | WEIGHT: 293 LBS | OXYGEN SATURATION: 98 % | DIASTOLIC BLOOD PRESSURE: 78 MMHG

## 2023-01-13 DIAGNOSIS — G47.30 HYPERSOMNIA WITH SLEEP APNEA: Primary | ICD-10-CM

## 2023-01-13 DIAGNOSIS — G47.10 HYPERSOMNIA WITH SLEEP APNEA: Primary | ICD-10-CM
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
## 2023-01-13 NOTE — PATIENT INSTRUCTIONS
[address] [facility] [address]. [place], [address]  Tel.  [phone]  Fax. [address] [facility] [address]  [place], [address] [facility]  Tel.  [phone]  Fax. [phone] [address]  Tel.  [phone]  Fax. [phone]     Sleep Apnea: After Your Visit  Your Care Instructions  Sleep apnea occurs when you frequently stop breathing for 10 seconds or longer during sleep. It can be mild to severe, based on the number of times per hour that you stop breathing or have slowed breathing. Blocked or narrowed airways in your nose, mouth, or throat can cause sleep apnea. Your airway can become blocked when your throat muscles and tongue relax during sleep. Sleep apnea is common, occurring in 1 out of 20 individuals. Individuals having any of the following characteristics should be evaluated and treated right away due to high risk and detrimental consequences from untreated sleep apnea:  Obesity  Congestive Heart failure  Atrial Fibrillation  Uncontrolled Hypertension  Type II Diabetes  Night-time Arrhythmias  Stroke  Pulmonary Hypertension  High-risk Driving Populations (pilots, truck drivers, etc.)  Patients Considering Weight-loss Surgery    How do you know you have sleep apnea? You probably have sleep apnea if you answer 'yes' to 3 or more of the following questions:  S - Have you been told that you Snore? T - Are you often Tired during the day? O - Has anyone Observed you stop breathing while sleeping? P- Do you have (or are being treated for) high blood Pressure? B - Are you obese (Body Mass Index > 35)? A - Is your Age 48years old or older? N - Is your Neck size greater than 16 inches? G - Are you male Gender? A sleep physician can prescribe a breathing device that prevents tissues in the throat from blocking your airway. Or your doctor may recommend using a dental device (oral breathing device) to help keep your airway open.  In some cases, surgery may be needed to remove enlarged tissues in the throat. Follow-up care is a key part of your treatment and safety. Be sure to make and go to all appointments, and call your doctor if you are having problems. It's also a good idea to know your test results and keep a list of the medicines you take. How can you care for yourself at home? Lose weight, if needed. It may reduce the number of times you stop breathing or have slowed breathing. Go to bed at the same time every night. Sleep on your side. It may stop mild apnea. If you tend to roll onto your back, sew a pocket in the back of your paApollidon top. Put a tennis ball into the pocket, and stitch the pocket shut. This will help keep you from sleeping on your back. Avoid alcohol and medicines such as sleeping pills and sedatives before bed. Do not smoke. Smoking can make sleep apnea worse. If you need help quitting, talk to your doctor about stop-smoking programs and medicines. These can increase your chances of quitting for good. Prop up the head of your bed 4 to 6 inches by putting bricks under the legs of the bed. Treat breathing problems, such as a stuffy nose, caused by a cold or allergies. Use a continuous positive airway pressure (CPAP) breathing machine if lifestyle changes do not help your apnea and your doctor recommends it. The machine keeps your airway from closing when you sleep. If CPAP does not help you, ask your doctor whether you should try other breathing machines. A bilevel positive airway pressure machine has two types of air pressureâone for breathing in and one for breathing out. Another device raises or lowers air pressure as needed while you breathe. If your nose feels dry or bleeds when using one of these machines, talk with your doctor about increasing moisture in the air. A humidifier may help.   If your nose is runny or stuffy from using a breathing machine, talk with your doctor about using decongestants or a corticosteroid nasal spray.  When should you call for help? Watch closely for changes in your health, and be sure to contact your doctor if:  You still have sleep apnea even though you have made lifestyle changes. You are thinking of trying a device such as CPAP. You are having problems using a CPAP or similar machine. Where can you learn more? Go to [employer]. Enter H952 in the search box to learn more about \"Sleep Apnea: After Your Visit. \"   © 9354-7206 Healthwise, Incorporated. Care instructions adapted under license by [facility] (which disclaims liability or warranty for this information). This care instruction is for use with your licensed healthcare professional. If you have questions about a medical condition or this instruction, always ask your healthcare professional. Ximena Wood any warranty or liability for your use of this information. PROPER SLEEP HYGIENE    What to avoid  Do not have drinks with caffeine, such as coffee or black tea, for 8 hours before bed. Do not smoke or use other types of tobacco near bedtime. Nicotine is a stimulant and can keep you awake. Avoid drinking alcohol late in the evening, because it can cause you to wake in the middle of the night. Do not eat a big meal close to bedtime. If you are hungry, eat a light snack. Do not drink a lot of water close to bedtime, because the need to urinate may wake you up during the night. Do not read or watch TV in bed. Use the bed only for sleeping and sexual activity. What to try  Go to bed at the same time every night, and wake up at the same time every morning. Do not take naps during the day. Keep your bedroom quiet, dark, and cool. Get regular exercise, but not within 3 to 4 hours of your bedtime. .  Sleep on a comfortable pillow and mattress. If watching the clock makes you anxious, turn it facing away from you so you cannot see the time.   If you worry when you lie down, start a worry book. Well before bedtime, write down your worries, and then set the book and your concerns aside. Try meditation or other relaxation techniques before you go to bed. If you cannot fall asleep, get up and go to another room until you feel sleepy. Do something relaxing. Repeat your bedtime routine before you go to bed again. Make your house quiet and calm about an hour before bedtime. Turn down the lights, turn off the TV, log off the computer, and turn down the volume on music. This can help you relax after a busy day. Drowsy Driving  The [address] [place] [address] Road Traffic Safety Administration cites drowsiness as a causing factor in more than 477,120 police reported crashes annually, resulting in 76,000 injuries and 1,500 deaths. Other surveys suggest 55% of people polled have driven while drowsy in the past year, 23% had fallen asleep but not crashed, 3% crashed, and 2% had and accident due to drowsy driving. Who is at risk? Young Drivers: One study of drowsy driving accidents states that 55% of the drivers were under 25 years. Of those, 75% were male. Shift Workers and Travelers: People who work overnight or travel across time zones frequently are at higher risk of experiencing Circadian Rhythm Disorders. They are trying to work and function when their body is programed to sleep. Sleep Deprived: Lack of sleep has a serious impact on your ability to pay attention or focus on a task. Consistently getting less than the average of 8 hours your body needs creates partial or cumulative sleep deprivation. Untreated Sleep Disorders: Sleep Apnea, Narcolepsy, R.L.S., and other sleep disorders (untreated) prevent a person from getting enough restful sleep. This leads to excessive daytime sleepiness and increases the risk for drowsy driving accidents by up to 7 times. Medications / Alcohol: Even over the counter medications can cause drowsiness.  Medications that impair a drivers attention should have a warning label. Alcohol naturally makes you sleepy and on its own can cause accidents. Combined with excessive drowsiness its effects are amplified. Signs of Drowsy Driving:   * You don't remember driving the last few miles   * You may drift out of your mary   * You are unable to focus and your thoughts wander   * You may yawn more often than normal   * You have difficulty keeping your eyes open / nodding off   * Missing traffic signs, speeding, or tailgating  Prevention-   Good sleep hygiene, lifestyle and behavioral choices have the most impact on drowsy driving. There is no substitute for sleep and the average person requires 8 hours nightly. If you find yourself driving drowsy, stop and sleep. Consider the sleep hygiene tips provided during your visit as well. Medication Refill Policy: Refills for all medications require 1 week advance notice. Please have your pharmacy fax a refill request. We are unable to fax, or call in \"controled substance\" medications and you will need to pick these prescriptions up from our office. MobileAware Activation    Thank you for requesting access to Mobile[employer]ware. Please follow the instructions below to securely access and download your online medical record. MobileAware allows you to send messages to your doctor, view your test results, renew your prescriptions, schedule appointments, and more. How Do I Sign Up? In your internet browser, go to [email or web address]. Click on the First Time User? Click Here link in the Sign In box. You will see the New Member Sign Up page. Enter your MobileAware Access Code exactly as it appears below. You will not need to use this code after youve completed the sign-up process. If you do not sign up before the expiration date, you must request a new code. MobileAware Access Code:  Activation code not generated  Current MobileAware Status: Active (This is the date your MobileAware access code will )    Enter the last four digits of your Social Security Number (xxxx) and Date of Birth (mm/dd/yyyy) as indicated and click Submit. You will be taken to the next sign-up page. Create a 9+ ID. This will be your 9+ login ID and cannot be changed, so think of one that is secure and easy to remember. Create a 9+ password. You can change your password at any time. Enter your Password Reset Question and Answer. This can be used at a later time if you forget your password. Enter your e-mail address. You will receive e-mail notification when new information is available in [address] Click Sign Up. You can now view and download portions of your medical record. Click the [employer] link to download a portable copy of your medical information. Additional Information    If you have questions, please call [phone]. Remember, 9+ is NOT to be used for urgent needs. For medical emergencies, dial 911.

## 2023-02-07 ENCOUNTER — HOSPITAL ENCOUNTER (OUTPATIENT)
Dept: SLEEP MEDICINE | Age: 38
Discharge: HOME | End: 2023-02-07
Payer: MEDICAID

## 2023-02-07 ENCOUNTER — OFFICE VISIT (OUTPATIENT)
Dept: SLEEP MEDICINE | Age: 38
End: 2023-02-07

## 2023-02-07 DIAGNOSIS — G47.30 HYPERSOMNIA WITH SLEEP APNEA: Primary | ICD-10-CM

## 2023-02-07 DIAGNOSIS — G47.10 HYPERSOMNIA WITH SLEEP APNEA: Primary | ICD-10-CM

## 2023-02-07 PROCEDURE — 95806 SLEEP STUDY UNATT&RESP EFFT: CPT | Performed by: INTERNAL MEDICINE

## 2023-02-07 NOTE — PROGRESS NOTES
S>Natalee Stout is a 44 y.o. female seen today to receive a home sleep testing unit (HST). Patient was educated on proper hookup and operation of the HST via detailed instruction sheet . Instruction forms with after hours contact and documentation were signed. O>    There were no vitals taken for this visit. A>  No diagnosis found. P>  General information regarding operations and maintenance of the device was provided. Follow-up appointment was made to return the HST. She will be contacted once the results have been reviewed. She was asked to contact our office for any problems regarding her home sleep test study.

## 2023-02-08 ENCOUNTER — TELEPHONE (OUTPATIENT)
Dept: SLEEP MEDICINE | Age: 38
End: 2023-02-08

## 2023-02-08 DIAGNOSIS — G47.10 HYPERSOMNIA WITH SLEEP APNEA: Primary | ICD-10-CM

## 2023-02-08 DIAGNOSIS — G47.30 HYPERSOMNIA WITH SLEEP APNEA: Primary | ICD-10-CM

## 2023-02-09 ENCOUNTER — PATIENT MESSAGE (OUTPATIENT)
Dept: SLEEP MEDICINE | Age: 38
End: 2023-02-09

## 2023-02-09 NOTE — TELEPHONE ENCOUNTER
Olimpia Linder underwent Sleep Testing which was indicative of an average AHI of 17.0 per hour with an SpO2 forrest of 69% and SpO2 of < 88% being 6.7 minutes. An APAP prescription has been written and patient will be contacted by office staff regarding follow-up  in 2-3 months after initiation of therapy. Encounter Diagnosis   Name Primary? Hypersomnia with sleep apnea Yes       Orders Placed This Encounter    AMB SUPPLY ORDER     Diagnosis: Obstructive Sleep Apnea ICD-10 Code (G47.33)    Positive Airway Pressure Therapy: Duration of need: 99 months. APAP Device with Heated Humidifer [id number] / [id number]. Minimum Pressure: 06 cmH2O, Maximum Pressure: 20 cmH2O.     Nasal Pillows Combo Mask (Replace) 2 per month.  Nasal Pillows (Replace) 2 per month.  Full Face Mask 1 every 3 months.  Full Face Mask Cushion 1 per month.  Nasal Cushion (Replace) 2 per month.  Nasal Interface Mask 1 every 3 months.  Headgear 1 every 6 months.  Chinstrap 1 every 6 months.  Tubing 1 every 3 months.  Filter(s) Disposable 2 per month.  Filter(s) Non-Disposable 1 every 6 months. .   [address] [facility] for Humidifier (Replace) 1 every 6 months. Perform Mask Fitting per patient preference and comfort - replace as above. Elliott Ponce MD, [facility]; NPI: [id number]  Electronically signed. 02/09/23

## 2023-02-22 ENCOUNTER — OFFICE VISIT (OUTPATIENT)
Dept: ORTHOPEDIC SURGERY | Age: 38
End: 2023-02-22
Payer: MEDICAID

## 2023-02-22 VITALS — BODY MASS INDEX: 48.82 KG/M2 | HEIGHT: 65 IN | WEIGHT: 293 LBS

## 2023-02-22 DIAGNOSIS — M51.36 DEGENERATIVE DISC DISEASE, LUMBAR: ICD-10-CM

## 2023-02-22 DIAGNOSIS — M54.50 CHRONIC BILATERAL LOW BACK PAIN WITHOUT SCIATICA: Primary | ICD-10-CM

## 2023-02-22 DIAGNOSIS — G89.29 CHRONIC BILATERAL LOW BACK PAIN WITHOUT SCIATICA: Primary | ICD-10-CM

## 2023-02-22 DIAGNOSIS — E66.01 MORBID OBESITY WITH BMI OF 45.0-49.9, ADULT (HCC): ICD-10-CM

## 2023-02-22 PROCEDURE — 99214 OFFICE O/P EST MOD 30 MIN: CPT | Performed by: STUDENT IN AN ORGANIZED HEALTH CARE EDUCATION/TRAINING PROGRAM

## 2023-02-22 RX ORDER — DICLOFENAC SODIUM 10 MG/G
GEL TOPICAL
COMMUNITY
Start: 2023-02-21

## 2023-02-22 RX ORDER — METHYLPREDNISOLONE 4 MG/1
TABLET ORAL
COMMUNITY
Start: 2023-01-20

## 2023-02-22 RX ORDER — ETODOLAC 500 MG/1
TABLET, FILM COATED ORAL
COMMUNITY
Start: 2023-01-23

## 2023-02-22 NOTE — PROGRESS NOTES
Mdaalyn Doe (: 1985) is a 44 y.o. female here for evaluation of the following chief complaint(s):  Back Pain (MRI results)       ASSESSMENT/PLAN:  Below is the assessment and plan developed based on review of pertinent history, physical exam, labs, studies, and medications. 1. Chronic bilateral low back pain without sciatica  2. Morbid obesity with BMI of 45.0-49.9, adult ([address]  3. Degenerative disc disease, lumbar    Return in about 6 weeks (around 2023) for Follow up after Lumbar MBB Injection. Red flag symptoms discussed with the patient. Patient is to present to the emergency department if any of these symptoms occur. Patient verbalized understanding and agrees to proceed with the aforementioned plan. Thank you for allowing me to participate in the care of this patient. SUBJECTIVE/OBJECTIVE:  HPI  Patient is a pleasant 27-year-old female who is well-known to the spine service. She is here today for MRI lumbar spine review. Her pain has not improved with conservative management in the form of physical therapy and multiple prescription medications including anti-inflammatories. She would like to move forward with next steps. Review of Systems  See above HPI and prior clinic notes for full ROS     Ht 5' 5\" (1.651 m)   Wt 318 lb (144.2 kg)   BMI 52.92 kg/m²    Physical Exam  No interval change in PE, grossly motor/sensory intact, no new neurological deficits    IMAGING:  MRI lumbar spine reviewed. I agree with radiology findings. Multilevel disc degeneration with moderate bilateral neuroforaminal stenosis. Associated posterior facet arthrosis. See radiology report below for full details. MRI Results (most recent):  Results from Appointment encounter on 23    MRI LUMB SPINE WO CONT    Narrative  EXAM: MRI LUMB SPINE WO CONT    INDICATION: Dx: DDD (degenerative disc disease), lumbar [M51.36 (ICD-10-CM)];   Lumbar spondylosis [id number] (ICD-10-CM)]    COMPARISON: Lumbosacral spine radiographs 6/1/2022    TECHNIQUE: MR imaging of the lumbar spine was performed using the following  sequences: sagittal T1, T2, STIR;  axial T1, T2.    CONTRAST:  None. FINDINGS:    There is normal alignment of the lumbar spine. Vertebral body heights are  maintained. Multilevel degenerative endplate osteophytes, most pronounced at  L5-S1. Prominent degenerative endplate marrow edema signal at L5-S1. The conus medullaris terminates at L1-L2. Signal and caliber of the distal  spinal cord are within normal limits. The paraspinal soft tissues are within normal limits. T11-T12: Disc bulge. Facet arthropathy. Mild spinal stenosis. Moderate right and  mild left foraminal stenosis. T12-L1: Facet arthropathy. No stenosis. L1-L2: Moderate facet arthropathy. No stenosis. L2-L3: Moderate facet arthropathy. No stenosis. L3-L4: Marked facet arthropathy. No stenosis. L4-L5: Disc bulge with superimposed bilateral foraminal disc extrusion. Marked  facet arthropathy. No spinal stenosis. Left subarticular zone narrowing. Mild  bilateral foraminal stenosis. L5-S1: Disc bulge. Endplate osteophytes. Marked facet arthropathy with  effusions. No spinal stenosis. Right subarticular zone narrowing. Severe  bilateral foraminal stenosis. Impression  1. Multilevel degenerative changes and disc disease. No significant lumbar  spinal canal stenosis. L5-S1 severe bilateral and L4-L5 mild bilateral neural  foraminal stenoses. 2.  Incidentally noted and T11-T12 mild spinal canal stenosis, moderate right  foraminal stenosis, and mild left foraminal stenosis. An electronic signature was used to authenticate this note.   -- Claudia Burrows DO

## 2023-02-28 ENCOUNTER — TELEPHONE (OUTPATIENT)
Dept: SLEEP MEDICINE | Age: 38
End: 2023-02-28

## 2023-02-28 ENCOUNTER — DOCUMENTATION ONLY (OUTPATIENT)
Dept: SLEEP MEDICINE | Age: 38
End: 2023-02-28

## 2023-02-28 NOTE — TELEPHONE ENCOUNTER
Called patient to inform pap order was faxed to [facility].  Phone# [phone]  Informed patient to call us once patient gets pap device to schedule a 1st Adherence visit   ( Follow Up visit)

## 2023-03-03 NOTE — PROGRESS NOTES
ASSESSMENT/PLAN:  Below is the assessment and plan developed based on review of pertinent history, physical exam, labs, studies, and medications. 1. Traumatic complete tear of right rotator cuff, initial encounter  2. Bilateral patellofemoral chondromalacia      In discussion with the patient, we considered the numerus possible diagnoses that could be contributing to their present symptoms. We also deliberated on the extensive management options that must be considered to treat their current condition. We reviewed their accessible prior medical records, diagnostic tests, and current health and employment information. We considered how these symptoms were affecting the patient´s activities of daily living as well as employment and fitness activities. The patient had various questions regarding the possible risks, benefits, complications, morbidity and mortality regarding their diagnosis and treatment options. The patients´ comorbidities were considered, and I advocated that they consider maximizing lifestyle modification through nutrition and exercise to aid in addressing their symptoms. Shared decision making yielded an understanding to move forward with conservation treatment preferences. The patient expressed understanding that if conservative management fails to alleviate the present symptoms they will return to office for re-evaluation and consideration of additional diagnostic tests and potential surgical options. In the interim, we have recommended ice, elevation, and take prescription anti-inflammatory medications along with a physician directed home exercise program. We discussed the risks and common side effects of anti-inflammatory medications and instructed the patient to discontinue the medication and contact us if they experienced any side effects.  The patient was encouraged to discuss the possible side effects with their family physician or pharmacist prior to initiating any new medications. We discussed the fact that many of the recommended treatment options presented are significantly limited by the patient´s social determinants of health. We also reviewed the circumstances surrounding the environment that they live and work which affect a wide range of health risk. We considered the limited access to appropriate educational resources regarding proper nutrition and exercise as well as the economic and social support necessary to maintain health and wellbeing. Given that the patient's symptoms are increasing in frequency and duration we have decided to prescribe physical therapy. We talked about the fact that the goal of physical therapy is for the therapist to assist in developing a program to help return the patient to full strength, function and mobility and decrease pain. We also discussed that the therapist may combine several techniques to help decrease pain. These include but are not limited to stretching, balance exercises, strength training, massage, cold and heat therapy, and electrical stimulation. Although, physical therapy is generally safe, we went over the potential risks to include the worsening of pre-existing conditions, continued pain and no improvement in flexibility, mobility, and strength. We will have the patient follow up after physical therapy to closely monitor their progress. We talked about following up sooner if therapy is not progressing on a weekly basis. The patient has done well with her right shoulder and well progressed to an at home maintenance program.  We discussed with her that she has some signs of earlier osteoarthritis of her left knee medial lateral compartments and patellofemoral chondromalacia in both knees. We discussed treatment options and we will proceed with physical therapy for both of her knees. We also discussed weight loss as this would help with most of her joint pains.   She will return the office for reevaluation if her pain does not improve for discussion of possible MRI of her knees. SUBJECTIVE/OBJECTIVE:  Francisca Nair (: 1985) is a 44 y.o. female, patient,here for evaluation of the No chief complaint on file. .    Patient returns today for follow-up of her right shoulder. She underwent right shoulder arthroscopy with rotator cuff repair, arthroscopic biceps tenodesis and extensive debridement on 2022. She has been progressing with her range of motion and strength with physical therapy. She has started to transition to home exercise program.  She also started to have bilateral knee pain over the last few months without any injury. Most of the knee pain is felt anteriorly. She has trouble with stairs or with squatting maneuvers. She does have some mechanical symptoms in the left knee. She does have a history of an old injury in her left knee when she was younger but was not exactly sure what the injury was. She uses topical diclofenac cream which helps with the pain. Physical Exam    Examination of the operative shoulder reveals that the incisions are healing well, without evidence of drainage, erythema or warmth. There is full range of motion and strength of the shoulder. Strength is 5/5 distally. There is no tenderness at the elbow and wrist. Sensation is intact to light touch distally and there is a brisk capillary refill. Upon examination of the right knee, the patient is nontender to palpation along the medial and lateral joint lines, and has no effusion. They are tender to palpation along the medial and lateral facets of the patella. They have crepitus of the patellofemoral joint with range of motion and discomfort with patella grind testing. The patient has no discomfort with Maryjane´s maneuvers, and the knee is stable. They have full range of motion. They have 5/5 strength, and are neurovascularly intact distally. There is no erythema, warmth or skin lesions present.     Upon examination of the left knee, the patient is nontender to palpation along the medial and lateral joint lines, and has no effusion. They are tender to palpation along the medial and lateral facets of the patella. They have crepitus of the patellofemoral joint with range of motion and discomfort with patella grind testing. The patient has no discomfort with Maryjane´s maneuvers, and the knee is stable. They have full range of motion. They have 5/5 strength, and are neurovascularly intact distally. There is no erythema, warmth or skin lesions present. No Known Allergies    Current Outpatient Medications   Medication Sig    diclofenac EC (VOLTAREN) 75 mg EC tablet Take 1 Tablet by mouth two (2) times daily (with meals). ibuprofen (MOTRIN) 800 mg tablet Take 1 Tablet by mouth three (3) times daily. (Patient not taking: Reported on 2022)     No current facility-administered medications for this visit. Past Medical History:   Diagnosis Date    Abnormal Papanicolaou smear of cervix     Leep procedure in     Anemia     \"borderline\" no supplement    Knee pain, left 2013    Postpartum depression 2017    after last baby, who is 2 years at this time       Past Surgical History:   Procedure Laterality Date    HX  SECTION  2009    HX DILATION AND CURETTAGE      HX LEEP PROCEDURE      [place]  DELIVERY ONLY      2009 - boy, 10/18/2013 - girl, 10/6/2014 - twins (girl and boy), 2017 - boy       Family History   Problem Relation Age of Onset    Diabetes Mother     Hypertension Mother     Heart Attack Mother     Other Mother          of pancreatitis complications    Diabetes Father     Heart Disease Father     Asthma Brother     Stroke Brother 32        He was incarcerated at the time, possible \"poisoning\" but never had autopsy.     No Known Problems Sister     Cancer Cousin         breast cancer    No Known Problems Sister     No Known Problems Sister        Social History Socioeconomic History    Marital status: SINGLE     Spouse name: Not on file    Number of children: Not on file    Years of education: Not on file    Highest education level: Not on file   Occupational History    Not on file   Tobacco Use    Smoking status: Never    Smokeless tobacco: Never   Vaping Use    Vaping Use: Never used   Substance and Sexual Activity    Alcohol use: No     Comment: socially    Drug use: No    Sexual activity: Yes     Partners: Male     Birth control/protection: None   Other Topics Concern    Not on file   Social History Narrative    Not on file     Social Determinants of Health     Financial Resource Strain: Not on file   Food Insecurity: Not on file   Transportation Needs: Not on file   Physical Activity: Not on file   Stress: Not on file   Social Connections: Not on file   Intimate Partner Violence: Not on file   Housing Stability: Not on file       Review of Systems    No flowsheet data found. Vitals: There were no vitals taken for this visit. There is no height or weight on file to calculate BMI. An electronic signature was used to authenticate this note.   -- Paula Camargo MD

## 2023-03-13 ENCOUNTER — OFFICE VISIT (OUTPATIENT)
Dept: ORTHOPEDIC SURGERY | Age: 38
End: 2023-03-13
Payer: MEDICAID

## 2023-03-13 VITALS — HEIGHT: 65 IN | BODY MASS INDEX: 48.82 KG/M2 | WEIGHT: 293 LBS

## 2023-03-13 DIAGNOSIS — M22.42 CHONDROMALACIA OF PATELLOFEMORAL JOINT, LEFT: ICD-10-CM

## 2023-03-13 DIAGNOSIS — M25.561 PAIN IN BOTH KNEES, UNSPECIFIED CHRONICITY: Primary | ICD-10-CM

## 2023-03-13 DIAGNOSIS — M25.562 PAIN IN BOTH KNEES, UNSPECIFIED CHRONICITY: Primary | ICD-10-CM

## 2023-03-13 DIAGNOSIS — M22.41 CHONDROMALACIA OF PATELLOFEMORAL JOINT, RIGHT: ICD-10-CM

## 2023-03-13 PROCEDURE — 99214 OFFICE O/P EST MOD 30 MIN: CPT | Performed by: ORTHOPAEDIC SURGERY

## 2023-03-25 ENCOUNTER — APPOINTMENT (OUTPATIENT)
Dept: GENERAL RADIOLOGY | Age: 38
End: 2023-03-25
Attending: STUDENT IN AN ORGANIZED HEALTH CARE EDUCATION/TRAINING PROGRAM
Payer: MEDICAID

## 2023-03-25 ENCOUNTER — HOSPITAL ENCOUNTER (EMERGENCY)
Age: 38
Discharge: HOME | End: 2023-03-26
Attending: EMERGENCY MEDICINE
Payer: MEDICAID

## 2023-03-25 VITALS
HEART RATE: 91 BPM | OXYGEN SATURATION: 98 % | TEMPERATURE: 98 F | DIASTOLIC BLOOD PRESSURE: 79 MMHG | SYSTOLIC BLOOD PRESSURE: 120 MMHG | RESPIRATION RATE: 20 BRPM

## 2023-03-25 DIAGNOSIS — M25.562 ACUTE PAIN OF LEFT KNEE: Primary | ICD-10-CM

## 2023-03-25 PROCEDURE — 74011250637 HC RX REV CODE- 250/637: Performed by: STUDENT IN AN ORGANIZED HEALTH CARE EDUCATION/TRAINING PROGRAM

## 2023-03-25 PROCEDURE — 73562 X-RAY EXAM OF KNEE 3: CPT

## 2023-03-25 PROCEDURE — 99283 EMERGENCY DEPT VISIT LOW MDM: CPT

## 2023-03-25 RX ORDER — IBUPROFEN 400 MG/1
800 TABLET ORAL ONCE
Status: COMPLETED | OUTPATIENT
Start: 2023-03-25 | End: 2023-03-25

## 2023-03-25 RX ADMIN — IBUPROFEN 800 MG: 400 TABLET ORAL at 23:45

## 2023-03-26 PROCEDURE — 74011250636 HC RX REV CODE- 250/636: Performed by: STUDENT IN AN ORGANIZED HEALTH CARE EDUCATION/TRAINING PROGRAM

## 2023-03-26 RX ORDER — ONDANSETRON 4 MG/1
4 TABLET, ORALLY DISINTEGRATING ORAL
Status: COMPLETED | OUTPATIENT
Start: 2023-03-26 | End: 2023-03-26

## 2023-03-26 RX ADMIN — ONDANSETRON 4 MG: 4 TABLET, ORALLY DISINTEGRATING ORAL at 00:37

## 2023-03-26 NOTE — ED NOTES
RN reviewed discharge instructions with the patient. The patient verbalized understanding. Pt left ED with discharge papers in hand.

## 2023-03-26 NOTE — ED PROVIDER NOTES
44 y.o. female with history of anemia presents to ED with left knee pain. Patient reports that this morning she was standing in her kitchen when her left knee spontaneously \"gave out on me\". Patient notes pain and slight swelling since then. She notes that she has no history of similar symptoms. Denies any fevers, chills, numbness, tingling. She does have a PCP but has not seen them for these concerns. Knee Pain        Past Medical History:   Diagnosis Date    Abnormal Papanicolaou smear of cervix     Leep procedure in     Anemia     \"borderline\" no supplement    Knee pain, left 2013    Postpartum depression 2017    after last baby, who is 2 years at this time       Past Surgical History:   Procedure Laterality Date    HX  SECTION  2009    HX DILATION AND CURETTAGE      HX LEEP PROCEDURE      [place] ANES NRV [facility]      2009 - boy, 10/18/2013 - girl, 10/6/2014 - twins (girl and boy), 2017 - boy         Family History:   Problem Relation Age of Onset    Diabetes Mother     Hypertension Mother     Heart Attack Mother     Other Mother          of pancreatitis complications    Diabetes Father     Heart Disease Father     Asthma Brother     Stroke Brother 32        He was incarcerated at the time, possible \"poisoning\" but never had autopsy. No Known Problems Sister     Cancer Cousin         breast cancer    No Known Problems Sister     No Known Problems Sister        Social History     Socioeconomic History    Marital status: SINGLE     Spouse name: Not on file    Number of children: Not on file    Years of education: Not on file    Highest education level: Not on file   Occupational History    Not on file   Tobacco Use    Smoking status: Never    Smokeless tobacco: Never   Vaping Use    Vaping Use: Never used   Substance and Sexual Activity    Alcohol use: No     Comment: socially    Drug use:  No Sexual activity: Yes     Partners: Male     Birth control/protection: None   Other Topics Concern    Not on file   Social History Narrative    Not on file     Social Determinants of Health     Financial Resource Strain: Not on file   Food Insecurity: Not on file   Transportation Needs: Not on file   Physical Activity: Not on file   Stress: Not on file   Social Connections: Not on file   Intimate Partner Violence: Not on file   Housing Stability: Not on file         ALLERGIES: Patient has no known allergies. Review of Systems   Constitutional:  Negative for fever. HENT:  Negative for congestion and sinus pressure. Respiratory:  Negative for shortness of breath. Cardiovascular:  Negative for chest pain. Gastrointestinal:  Negative for nausea and vomiting. Genitourinary:  Negative for dysuria. Musculoskeletal:  Negative for myalgias. Neurological:  Negative for dizziness and headaches. Hematological:  Negative for adenopathy. Psychiatric/Behavioral:  The patient is not nervous/anxious. All other systems reviewed and are negative. Vitals:    03/25/23 2251   BP: 120/79   Pulse: 91   Resp: 20   Temp: 98 °F (36.7 °C)   SpO2: 98%            Physical Exam  Vitals and nursing note reviewed. Constitutional:       General: She is not in acute distress. Appearance: Normal appearance. She is obese. HENT:      Head: Normocephalic and atraumatic. Eyes:      Extraocular Movements: Extraocular movements intact. Pupils: Pupils are equal, round, and reactive to light. Cardiovascular:      Rate and Rhythm: Normal rate and regular rhythm. Heart sounds: Normal heart sounds. Pulmonary:      Breath sounds: Normal breath sounds. Abdominal:      Palpations: Abdomen is soft. Tenderness: There is no abdominal tenderness. Musculoskeletal:      Right knee: No swelling or deformity. Normal range of motion. Left knee: No swelling or deformity. Normal range of motion.  Tenderness present. Lymphadenopathy:      Cervical: No cervical adenopathy. Skin:     General: Skin is warm and dry. Neurological:      General: No focal deficit present. Mental Status: She is alert and oriented to person, place, and time. Psychiatric:         Mood and Affect: Mood normal.         Behavior: Behavior normal.         Thought Content: Thought content normal.        Medical Decision Making  44 y.o. female with history of anemia presents to ED with left knee pain. Vital signs stable in triage patient afebrile. Physical exam notable for left knee tenderness to palpation but no swelling, deformity or reduced range of motion. Left knee x-ray showed no acute fracture. Did note joint effusion and mild degenerative change. Patient received Motrin while in ED with improvement of symptoms. She is already established with primary care and Ortho. Patient will be discharged with instructions for conservative care, follow-up and return precautions. Amount and/or Complexity of Data Reviewed  Radiology: ordered. Risk  Prescription drug management.            Procedures

## 2023-03-26 NOTE — ED TRIAGE NOTES
Pt arrives from home with cc of left knee pain. She states it suddenly gave out and is a little \"puffy\". Denies any trauma/fall/injury. Hx of arthritis in both knees.

## 2023-05-23 ENCOUNTER — OFFICE VISIT (OUTPATIENT)
Age: 38
End: 2023-05-23
Payer: MEDICAID

## 2023-05-23 VITALS
BODY MASS INDEX: 48.82 KG/M2 | HEART RATE: 80 BPM | WEIGHT: 293 LBS | TEMPERATURE: 98.2 F | SYSTOLIC BLOOD PRESSURE: 114 MMHG | OXYGEN SATURATION: 98 % | DIASTOLIC BLOOD PRESSURE: 71 MMHG | HEIGHT: 65 IN

## 2023-05-23 DIAGNOSIS — G47.33 OSA (OBSTRUCTIVE SLEEP APNEA): Primary | ICD-10-CM

## 2023-05-23 PROCEDURE — 99214 OFFICE O/P EST MOD 30 MIN: CPT | Performed by: INTERNAL MEDICINE

## 2023-05-23 ASSESSMENT — SLEEP AND FATIGUE QUESTIONNAIRES
HOW LIKELY ARE YOU TO NOD OFF OR FALL ASLEEP WHILE LYING DOWN TO REST IN THE AFTERNOON WHEN CIRCUMSTANCES PERMIT: 3
HOW LIKELY ARE YOU TO NOD OFF OR FALL ASLEEP IN A CAR, WHILE STOPPED FOR A FEW MINUTES IN TRAFFIC: 2
HOW LIKELY ARE YOU TO NOD OFF OR FALL ASLEEP WHILE SITTING INACTIVE IN A PUBLIC PLACE: 3
HOW LIKELY ARE YOU TO NOD OFF OR FALL ASLEEP WHILE SITTING AND TALKING TO SOMEONE: 2
HOW LIKELY ARE YOU TO NOD OFF OR FALL ASLEEP WHILE SITTING QUIETLY AFTER LUNCH WITHOUT ALCOHOL: 3
HOW LIKELY ARE YOU TO NOD OFF OR FALL ASLEEP WHILE SITTING AND READING: 3
HOW LIKELY ARE YOU TO NOD OFF OR FALL ASLEEP WHILE WATCHING TV: 3
ESS TOTAL SCORE: 22
HOW LIKELY ARE YOU TO NOD OFF OR FALL ASLEEP WHEN YOU ARE A PASSENGER IN A CAR FOR AN HOUR WITHOUT A BREAK: 3

## 2023-05-23 NOTE — PATIENT INSTRUCTIONS
[facility] [place]. [place], [address]  Tel.  [phone]  Fax. 100 [facility] 60  [place], [address] [facility]  Tel.  [phone]  Fax. [phone] [address]  Tel.  [phone]  Fax. [phone]     Learning About CPAP for Sleep Apnea  What is CPAP? CPAP is a small machine that you use at home every night while you sleep. It increases air pressure in your throat to keep your airway open. When you have sleep apnea, this can help you sleep better so you feel much better. CPAP stands for \"continuous positive airway pressure. \"  The CPAP machine will have one of the following:  A mask that covers your nose and mouth  Prongs that fit into your nose  A mask that covers your nose only, the most common type. This type is called NCPAP. The N stands for \"nasal.\"  Why is it done? CPAP is usually the best treatment for obstructive sleep apnea. It is the first treatment choice and the most widely used. Your doctor may suggest CPAP if you have: Moderate to severe sleep apnea. Sleep apnea and coronary artery disease (CAD) or heart failure. How does it help? CPAP can help you have more normal sleep, so you feel less sleepy and more alert during the daytime. CPAP may help keep heart failure or other heart problems from getting worse. NCPAP may help lower your blood pressure. If you use CPAP, your bed partner may also sleep better because you are not snoring or restless. What are the side effects? Some people who use CPAP have:  A dry or stuffy nose and a sore throat. Irritated skin on the face. Sore eyes. Bloating. If you have any of these problems, work with your doctor to fix them. Here are some things you can try:  Be sure the mask or nasal prongs fit well. See if your doctor can adjust the pressure of your CPAP. If your nose is dry, try a humidifier.   If your nose is runny or stuffy, try decongestant medicine or a steroid

## 2023-05-23 NOTE — PROGRESS NOTES
[facility], [address]  Tel.  [phone]    Fax. 76 [facility],   [place], [address] [facility]  Tel.  [phone]    Fax. [phone] [address]   Tel.  [phone]    Fax. [phone]       Nicolasa Uribe (: 1985) is a 44 y.o. female, established patient, seen for positive airway pressure follow-up and evaluation of the following chief complaint(s):   Sleep Problem (1st adherence )       Nicolasa Uribe was last seen by me on 22, prior notes reviewed in detail. Home Sleep Apnea Test (HSAT) performed on 23  was indicative of an average AHI of 17.0 per hour with an SpO2 linnea of 69% and SpO2 of < 88% being 6.7 minutes. ASSESSMENT/PLAN:   Diagnosis Orders   1. ANGELES (obstructive sleep apnea)  POLYSOMNOGRAPHY W/CPAP      2. BMI 45.0-49.9, adult (HCC)            On [facility] - Luisa G3:  APAP    Settings:  Min EPAP: 06 cmH2O  Max EPAP: 20 cmH2O      Sleep Apnea -   * She is not adherent with PAP therapy but PAP continues to benefit patient and remains necessary for control of her sleep apnea. * Mask evaluation done in the office - see tech notes. *Encouraged use of melatonin 3 mg to promote sleep onset for 2 weeks with PAP therapy and then as needed. * Testing ordered tor re-qualify for continued therapy. Orders Placed This Encounter   Procedures    POLYSOMNOGRAPHY W/CPAP     Standing Status:   Future     Standing Expiration Date:   2024         * Counseling was provided regarding the importance of regular PAP use with emphasis on ensuring sufficient total sleep time, proper sleep hygiene, and safe driving. * Re-enforced proper and regular cleaning for the device. * She was asked to contact our office for any problems regarding PAP therapy.       2. Recommended a dedicated weight loss program through appropriate diet and exercise regimen as significant weight reduction has been

## 2023-06-08 ENCOUNTER — HOSPITAL ENCOUNTER (OUTPATIENT)
Facility: HOSPITAL | Age: 38
Discharge: HOME | End: 2023-06-08
Payer: MEDICAID

## 2023-06-08 VITALS
WEIGHT: 293 LBS | HEART RATE: 80 BPM | SYSTOLIC BLOOD PRESSURE: 130 MMHG | TEMPERATURE: 97.1 F | HEIGHT: 65 IN | OXYGEN SATURATION: 98 % | BODY MASS INDEX: 48.82 KG/M2 | DIASTOLIC BLOOD PRESSURE: 75 MMHG

## 2023-06-08 PROCEDURE — 95811 POLYSOM 6/>YRS CPAP 4/> PARM: CPT | Performed by: INTERNAL MEDICINE

## 2023-06-09 ENCOUNTER — TELEPHONE (OUTPATIENT)
Age: 38
End: 2023-06-09

## 2023-06-09 DIAGNOSIS — G47.33 OSA (OBSTRUCTIVE SLEEP APNEA): Primary | ICD-10-CM

## 2023-06-09 NOTE — PROGRESS NOTES
Sleep Study Technical Notes   Disclaimer:  all notes have not been confirmed by interpreting physician      PRE-Test:  Trent Perez (: 1985) arrived in the lobby. The patient was taken to the Sleep Center and taken directly to his/her room. Procedure explained to the patient and questions were answered. The patient expressed understanding of the procedure. Electrodes were applied without incident. The patient was placed in bed and the study was started. Acquisition Notes:  Lights off: 10:35 PM    Respiratory events:   A__ No    H__ Yes  C__ No  M__ No  ECG:    Possible arrhythmias:   No  Snoring:  Mild  Sleep Stages: 1,2  REM   Yes  PAP titration information in Sleep Study CPAP Evaluation  Positional therapy with:   Patient slept with positional therapy:  No used  2 pillows  Patient slept with head of bed elevated:  No  Supine sleep assessed per physician order:  NA     If not, why??   Patient wore an oral appliance:  No  Other comments: None  Patient had caregiver in attendance:  No  Patient watched TV or on phone after lights out, TV on all night. Patient to bathroom 1 time  Pediatric Patient:  Parent accompanied patient: NA  Parent slept in bed with patient: NA      POST Test:  Patient was awakened. Electrodes were removed. The patient was discharged after answering the Post Questionnaire. Equipment and room cleaned per infection control policy.

## 2023-06-22 NOTE — TELEPHONE ENCOUNTER
Reviewed sleep study results. Patient will to come in for titration study. Do not use the Airfit N20 small. Use a different kind of nasal mask. Please schedule titration study.     Thanks

## 2023-06-22 NOTE — TELEPHONE ENCOUNTER
Modesta Rick is to be contacted by sleep technologists regarding results of Sleep Testing which was indicative of persistent events at lower pressures, PAP intolerance and sleep fragmentation at higher pressures indicative of CPAP Failure. * A second polysomnogram has been ordered for mask fitting, Bi-Level PAP desensitizing protocol, and pressure titration. Encounter Diagnosis   Name Primary? ANGELES (obstructive sleep apnea) Yes       Orders Placed This Encounter   Procedures    POLYSOMNOGRAPHY W/CPAP     Standing Status:   Future     Standing Expiration Date:   6/22/2024     Scheduling Instructions:      Perform Bi-level Titration.

## 2023-07-16 ENCOUNTER — HOSPITAL ENCOUNTER (OUTPATIENT)
Facility: HOSPITAL | Age: 38
Discharge: HOME | End: 2023-07-19
Payer: MEDICAID

## 2023-07-16 PROCEDURE — 95811 POLYSOM 6/>YRS CPAP 4/> PARM: CPT | Performed by: INTERNAL MEDICINE

## 2023-07-17 VITALS
DIASTOLIC BLOOD PRESSURE: 73 MMHG | TEMPERATURE: 98.3 F | HEART RATE: 62 BPM | SYSTOLIC BLOOD PRESSURE: 115 MMHG | OXYGEN SATURATION: 92 % | HEIGHT: 65 IN | WEIGHT: 293 LBS | BODY MASS INDEX: 48.82 KG/M2

## 2023-07-17 NOTE — PROGRESS NOTES
Sleep Study Technical Notes   Disclaimer:  all notes have not been confirmed by interpreting physician      PRE-Test:  Chris Childs (: 1985) arrived in the lobby. The patient was taken to the Sleep Center and taken directly to his/her room. Procedure explained to the patient and questions were answered. The patient expressed understanding of the procedure. Electrodes were applied without incident. The patient was placed in bed and the study was started. Acquisition Notes:  Lights off: 10:01 pm  Respiratory events:   A__Y  H__Y  C__Y  M__N  ECG:    Possible arrhythmias:   No  Snoring:  Mild to moderate    Sleep Stages:  REM   Y    PAP titration information in Sleep Study CPAP Evaluation  Positional therapy with:   Patient slept with positional therapy:  No  Patient slept with head of bed elevated:  No  Supine sleep assessed per physician order:  No  If not, why?? Not in order  Patient wore an oral appliance:  No  Other comments:   Patient had caregiver in attendance:  No  Patient watched TV or on phone after lights out for <1 hour  Patient to bathroom 1 time        POST Test:  Patient was awakened. Electrodes were removed. The patient was discharged after answering the Post Questionnaire. Equipment and room cleaned per infection control policy.

## 2023-07-27 ENCOUNTER — TELEPHONE (OUTPATIENT)
Facility: HOSPITAL | Age: 38
End: 2023-07-27

## 2023-07-27 DIAGNOSIS — G47.33 OSA (OBSTRUCTIVE SLEEP APNEA): Primary | ICD-10-CM

## 2023-07-28 NOTE — TELEPHONE ENCOUNTER
Polysomnography with PAP titration interpreted, device ordered. Encounter Diagnosis   Name Primary? ANGELES (obstructive sleep apnea) Yes         Orders Placed This Encounter   Procedures    DME Order for (Specify) as OP     - DME device ordered - ResMed Device with Heated Humidifer [id number] / .   - Diagnosis: Obstructive Sleep Apnea (G47.33)  - Length of Need: Lifetime      ResMed VPAP Auto (VAuto Mode): Maximum IPAP: 22 cmH2O; Minimum EPAP: 12 cmH2O; PS: 06 cmH2O. Ramp Time: 30 Minutes. CPAP mask -  As fitted during titration OR patient preference, headgear, tubing, and filter;  heated humidifier; wireless modem. Remote monitoring enrollment.     Abigail Kelly MD, [facility]; NPI: [id number]  7/28/2023

## 2023-08-09 ENCOUNTER — CLINICAL DOCUMENTATION (OUTPATIENT)
Age: 38
End: 2023-08-09

## 2023-09-22 ENCOUNTER — TELEPHONE (OUTPATIENT)
Age: 38
End: 2023-09-22

## 2023-09-23 ENCOUNTER — TELEPHONE (OUTPATIENT)
Age: 38
End: 2023-09-23

## 2023-11-01 ENCOUNTER — TELEPHONE (OUTPATIENT)
Age: 38
End: 2023-11-01

## 2024-02-07 ENCOUNTER — TELEPHONE (OUTPATIENT)
Age: 39
End: 2024-02-07

## 2024-02-12 ENCOUNTER — OFFICE VISIT (OUTPATIENT)
Age: 39
End: 2024-02-12
Payer: MEDICAID

## 2024-02-12 ENCOUNTER — CLINICAL DOCUMENTATION (OUTPATIENT)
Age: 39
End: 2024-02-12

## 2024-02-12 VITALS
BODY MASS INDEX: 48.82 KG/M2 | DIASTOLIC BLOOD PRESSURE: 80 MMHG | OXYGEN SATURATION: 100 % | HEART RATE: 86 BPM | SYSTOLIC BLOOD PRESSURE: 130 MMHG | TEMPERATURE: 98.2 F | WEIGHT: 293 LBS | HEIGHT: 65 IN

## 2024-02-12 DIAGNOSIS — G47.33 OSA (OBSTRUCTIVE SLEEP APNEA): Primary | ICD-10-CM

## 2024-02-12 DIAGNOSIS — G89.29 CHRONIC NONINTRACTABLE HEADACHE, UNSPECIFIED HEADACHE TYPE: ICD-10-CM

## 2024-02-12 DIAGNOSIS — R51.9 CHRONIC NONINTRACTABLE HEADACHE, UNSPECIFIED HEADACHE TYPE: ICD-10-CM

## 2024-02-12 PROCEDURE — 99215 OFFICE O/P EST HI 40 MIN: CPT | Performed by: NURSE PRACTITIONER

## 2024-02-12 RX ORDER — TIZANIDINE 2 MG/1
TABLET ORAL
COMMUNITY
Start: 2023-12-19

## 2024-02-12 NOTE — PROGRESS NOTES
[address]   [place] [address]   Tel.  [phone]   Fax. [phone]  [address]   [place] [address]   Tel.  [phone]   Fax. [phone] [address] [place] [address]   Tel.  [phone]   Fax. [phone]     Jared Simpson (: 1985) is a 38 y.o. female, established patient, seen for positive airway pressure follow-up and evaluation.  She was last seen by Dr. Robertson on 2023, prior notes and sleep testing reviewed in detail.  Home sleep test 2023 showed AHI of 17.0/hr with a lowest SpO2 of 69%, duration of SpO2 < 88% 6.7 min.  Weight at time of sleep testing 318 pounds.  BiPAP titration 2023 showed adequate therapy at 18/13 cmH2O.     ASSESSMENT/PLAN:   Diagnosis Orders   1. ANGELES (obstructive sleep apnea)  DME Order for (Specify) as OP    DME Order for (Specify) as OP      2. Chronic nonintractable headache, unspecified headache type        3. Adult BMI 50.0-59.9 kg/sq m (HCC)            AHI = 17.0(2023).  On Auto Bi - Level, Resmed :  Max IPAP 22, Min EPEP 12, PS 6 cmH2O. Set up 2023.    She is adherent with PAP therapy and PAP continues to benefit patient and remains necessary for control of her sleep apnea.     Follow-up and Dispositions    Return in 6 months (on 2024) for compliance follow up.         Sleep Apnea -  Sleep apnea condition has been exacerbated and treatment is causing negative side effects.  Change in treatment plan is needed.  Change current pressure settings to  Auto BiPAP Max IPAP 19, Min EPAP 11, PS 6 .  Changes made by provider in airview system and sent to [facility].    * Supplies - nasal mask and heated tubing    Orders Placed This Encounter   Procedures    DME Order for (Specify) as OP     Diagnosis: (G47.33) ANGELES (obstructive sleep apnea)  (primary encounter diagnosis)     Replacement Supplies for Positive Airway Pressure Therapy Device:   Duration of need: 99 months.        Nasal

## 2024-02-12 NOTE — PATIENT INSTRUCTIONS
[address]  [place] [address]  Tel.  [phone]  Fax. [phone] [address]  [place] [address]  Tel.  [phone]  Fax. [phone] [address] [place] [address]  Tel.  [phone]  Fax. [phone]     Learning About CPAP for Sleep Apnea  What is CPAP?              CPAP is a small machine that you use at home every night while you sleep. It increases air pressure in your throat to keep your airway open. When you have sleep apnea, this can help you sleep better so you feel much better. CPAP stands for \"continuous positive airway pressure.\"  The CPAP machine will have one of the following:  A mask that covers your nose and mouth  Prongs that fit into your nose  A mask that covers your nose only, the most common type. This type is called NCPAP. The N stands for \"nasal.\"  Why is it done?  CPAP is usually the best treatment for obstructive sleep apnea. It is the first treatment choice and the most widely used. Your doctor may suggest CPAP if you have:  Moderate to severe sleep apnea.  Sleep apnea and coronary artery disease (CAD) or heart failure.  How does it help?  CPAP can help you have more normal sleep, so you feel less sleepy and more alert during the daytime.  CPAP may help keep heart failure or other heart problems from getting worse.  NCPAP may help lower your blood pressure.  If you use CPAP, your bed partner may also sleep better because you are not snoring or restless.  What are the side effects?  Some people who use CPAP have:  A dry or stuffy nose and a sore throat.  Irritated skin on the face.  Sore eyes.  Bloating.  If you have any of these problems, work with your doctor to fix them. Here are some things you can try:  Be sure the mask or nasal prongs fit well.  See if your doctor can adjust the pressure of your CPAP.  If your nose is dry, try a humidifier.  If your nose is runny or stuffy, try decongestant medicine or a steroid

## 2024-02-19 ENCOUNTER — OFFICE VISIT (OUTPATIENT)
Age: 39
End: 2024-02-19
Payer: MEDICAID

## 2024-02-19 VITALS
WEIGHT: 293 LBS | HEART RATE: 78 BPM | OXYGEN SATURATION: 96 % | SYSTOLIC BLOOD PRESSURE: 136 MMHG | DIASTOLIC BLOOD PRESSURE: 82 MMHG | BODY MASS INDEX: 48.82 KG/M2 | HEIGHT: 65 IN

## 2024-02-19 DIAGNOSIS — G44.021 INTRACTABLE CHRONIC CLUSTER HEADACHE: ICD-10-CM

## 2024-02-19 DIAGNOSIS — G43.709 CHRONIC MIGRAINE WITHOUT AURA, NOT INTRACTABLE, WITHOUT STATUS MIGRAINOSUS: Primary | ICD-10-CM

## 2024-02-19 PROCEDURE — 99204 OFFICE O/P NEW MOD 45 MIN: CPT | Performed by: PSYCHIATRY & NEUROLOGY

## 2024-02-19 RX ORDER — RIZATRIPTAN BENZOATE 10 MG/1
10 TABLET, ORALLY DISINTEGRATING ORAL AS NEEDED
Qty: 9 TABLET | Refills: 1 | Status: SHIPPED | OUTPATIENT
Start: 2024-02-19

## 2024-02-19 RX ORDER — VERAPAMIL HYDROCHLORIDE 120 MG/1
120 CAPSULE, EXTENDED RELEASE ORAL NIGHTLY
Qty: 30 CAPSULE | Refills: 3 | Status: SHIPPED | OUTPATIENT
Start: 2024-02-19

## 2024-02-19 NOTE — PROGRESS NOTES
Chief Complaint   Patient presents with    Neurologic Problem     Headaches, \"pretty much daily\"     HISTORY OF PRESENT ILLNESS  Jared Simpson is a 38 y.o. female who came in for evaluation of headaches that have gotten worse over the past 6 months or so.  Prior to that she would get an occasional or rare migraine since age 17.  Lately she has been experiencing sharp, dull ache mainly in the right periorbital area but it changes location.  It is associated with watering from her eye and feels quite disabling.  It may last anywhere from 2 to 4 to 5 hours.  Has been getting it on a daily basis.  Sometimes she will have a day or 2 without any headache.  Does not take any medications because none of the OTC medications help.  Cannot think of any triggers.  She does have obstructive sleep apnea and uses CPAP.  Admits to having a lot of stress but sometimes she will not have any headache at all so she is not sure if it is actually stress.   No other focal motor or sensory symptoms.  She does not have a PCP.  Her eye exam was unremarkable recently except that it showed a larger size of optic disks      Past Medical History:   Diagnosis Date    Abnormal Papanicolaou smear of cervix     Leep procedure in 2004    Anemia     \"borderline\" no supplement    Knee pain, left 7/16/2013    Postpartum depression 06/30/2017    after last baby, who is 2 years at this time     Current Outpatient Medications   Medication Sig    rizatriptan (MAXALT-MLT) 10 MG disintegrating tablet Take 1 tablet by mouth as needed for Migraine May repeat in 2 hours if needed    verapamil (VERELAN) 120 MG extended release capsule Take 1 capsule by mouth nightly    tiZANidine (ZANAFLEX) 2 MG tablet TAKE 1 TABLET BY MOUTH IN THE EVENING AS NEEDED    diclofenac sodium (VOLTAREN) 1 % GEL ceived the following from [facility]: Outside name: diclofenac (VOLTAREN) 1 % gel    ibuprofen (ADVIL;MOTRIN) 800 MG tablet Take by mouth 3 times daily     No

## 2024-02-19 NOTE — PROGRESS NOTES
Chief Complaint   Patient presents with    Neurologic Problem     Headaches, \"pretty much daily\"     Vitals:    02/19/24 1047   BP: 136/82   Pulse: 78   SpO2: 96%

## 2024-02-27 ENCOUNTER — TELEPHONE (OUTPATIENT)
Age: 39
End: 2024-02-27

## 2024-02-27 NOTE — TELEPHONE ENCOUNTER
Patient would like a call back to let her know if the doctor wants to prescribe something other than verapamil or if the doctor wants to submit an appeal as her insurance has denied coverage for the medication.

## 2024-02-28 RX ORDER — VERAPAMIL HYDROCHLORIDE 40 MG/1
40 TABLET ORAL 2 TIMES DAILY
Qty: 60 TABLET | Refills: 1 | Status: SHIPPED | OUTPATIENT
Start: 2024-02-28

## 2024-02-28 NOTE — TELEPHONE ENCOUNTER
Called patient. Informed her that the doctor stated, \"It was likely denied as it was an extended release.  I will send in a immediate release 40 mg to be taken twice daily.  She can let us know if this is also not covered.\" Patient stated the pharmacy informed her that this medication is covered and she will pick it up.

## 2024-03-20 RX ORDER — TOPIRAMATE 50 MG/1
50 TABLET, FILM COATED ORAL 2 TIMES DAILY
Qty: 60 TABLET | Refills: 1 | Status: SHIPPED | OUTPATIENT
Start: 2024-03-20

## 2024-08-23 ENCOUNTER — CLINICAL DOCUMENTATION (OUTPATIENT)
Age: 39
End: 2024-08-23

## 2024-08-23 ENCOUNTER — TELEMEDICINE (OUTPATIENT)
Age: 39
End: 2024-08-23

## 2024-08-23 DIAGNOSIS — G47.33 OSA (OBSTRUCTIVE SLEEP APNEA): Primary | ICD-10-CM

## 2024-08-23 DIAGNOSIS — G89.29 CHRONIC NONINTRACTABLE HEADACHE, UNSPECIFIED HEADACHE TYPE: ICD-10-CM

## 2024-08-23 DIAGNOSIS — G47.10 HYPERSOMNIA, UNSPECIFIED: ICD-10-CM

## 2024-08-23 DIAGNOSIS — R51.9 CHRONIC NONINTRACTABLE HEADACHE, UNSPECIFIED HEADACHE TYPE: ICD-10-CM

## 2024-08-23 ASSESSMENT — SLEEP AND FATIGUE QUESTIONNAIRES
HOW LIKELY ARE YOU TO NOD OFF OR FALL ASLEEP WHILE SITTING INACTIVE IN A PUBLIC PLACE: HIGH CHANCE OF DOZING
HOW LIKELY ARE YOU TO NOD OFF OR FALL ASLEEP WHILE SITTING AND READING: HIGH CHANCE OF DOZING
HOW LIKELY ARE YOU TO NOD OFF OR FALL ASLEEP WHILE WATCHING TV: HIGH CHANCE OF DOZING
HOW LIKELY ARE YOU TO NOD OFF OR FALL ASLEEP WHILE SITTING QUIETLY AFTER LUNCH WITHOUT ALCOHOL: HIGH CHANCE OF DOZING
HOW LIKELY ARE YOU TO NOD OFF OR FALL ASLEEP WHILE SITTING AND TALKING TO SOMEONE: WOULD NEVER DOZE
FOSQ SCORE: 14
HOW LIKELY ARE YOU TO NOD OFF OR FALL ASLEEP IN A CAR, WHILE STOPPED FOR A FEW MINUTES IN TRAFFIC: SLIGHT CHANCE OF DOZING
HOW LIKELY ARE YOU TO NOD OFF OR FALL ASLEEP WHILE SITTING INACTIVE IN A PUBLIC PLACE: HIGH CHANCE OF DOZING
DO YOU HAVE DIFFICULTY CONCENTRATING ON THE THINGS YOU DO BECAUSE YOU ARE SLEEPY OR TIRED: YES, A LITTLE
HAS YOUR MOOD BEEN AFFECTED BECAUSE YOU ARE SLEEPY OR TIRED: YES, MODERATE
DO YOU GENERALLY HAVE DIFFICULTY REMEMBERING THINGS BECAUSE YOU ARE SLEEPY OR TIRED: YES, EXTREME
HOW LIKELY ARE YOU TO NOD OFF OR FALL ASLEEP WHILE WATCHING TV: HIGH CHANCE OF DOZING
HOW LIKELY ARE YOU TO NOD OFF OR FALL ASLEEP WHEN YOU ARE A PASSENGER IN A CAR FOR AN HOUR WITHOUT A BREAK: HIGH CHANCE OF DOZING
HAS YOUR RELATIONSHIP WITH FAMILY, FRIENDS OR WORK COLLEAGUES BEEN AFFECTED BECAUSE YOU ARE SLEEPY OR TIRED: YES, A LITTLE
HOW LIKELY ARE YOU TO NOD OFF OR FALL ASLEEP WHILE SITTING AND READING: HIGH CHANCE OF DOZING
HOW LIKELY ARE YOU TO NOD OFF OR FALL ASLEEP WHILE SITTING AND TALKING TO SOMEONE: WOULD NEVER DOZE
ESS TOTAL SCORE: 19
HOW LIKELY ARE YOU TO NOD OFF OR FALL ASLEEP WHILE SITTING QUIETLY AFTER LUNCH WITHOUT ALCOHOL: HIGH CHANCE OF DOZING
DO YOU HAVE DIFFICULTY VISITING YOUR FAMILY OR FRIENDS IN THEIR HOME BECAUSE YOU BECOME SLEEPY OR TIRED: NO
DO YOU HAVE DIFFICULTY BEING AS ACTIVE AS YOU WANT TO BE IN THE EVENING BECAUSE YOU ARE SLEEPY OR TIRED: YES, LITTLE
DO YOU HAVE DIFFICULTY OPERATING A MOTOR VEHICLE FOR SHORT DISTANCES (LESS THAN 100 MILES) BECAUSE YOU BECOME SLEEPY: YES, A LITTLE
DO YOU HAVE DIFFICULTY OPERATING A MOTOR VEHICLE FOR LONG DISTANCES (GREATER THAN 100 MILES) BECAUSE YOU BECOME SLEEPY: YES, A LITTLE
HOW LIKELY ARE YOU TO NOD OFF OR FALL ASLEEP WHILE LYING DOWN TO REST IN THE AFTERNOON WHEN CIRCUMSTANCES PERMIT: HIGH CHANCE OF DOZING
DO YOU HAVE DIFFICULTY WATCHING A MOVIE OR VIDEO BECAUSE YOU BECOME SLEEPY OR TIRED: YES, A LITTLE
DO YOU HAVE DIFFICULTY BEING AS ACTIVE AS YOU WANT TO BE IN THE MORNING BECAUSE YOU ARE SLEEPY OR TIRED: YES, LITTLE
HOW LIKELY ARE YOU TO NOD OFF OR FALL ASLEEP WHILE LYING DOWN TO REST IN THE AFTERNOON WHEN CIRCUMSTANCES PERMIT: HIGH CHANCE OF DOZING
HOW LIKELY ARE YOU TO NOD OFF OR FALL ASLEEP WHEN YOU ARE A PASSENGER IN A CAR FOR AN HOUR WITHOUT A BREAK: HIGH CHANCE OF DOZING
HOW LIKELY ARE YOU TO NOD OFF OR FALL ASLEEP IN A CAR, WHILE STOPPED FOR A FEW MINUTES IN TRAFFIC: SLIGHT CHANCE OF DOZING

## 2024-08-23 NOTE — PROGRESS NOTES
Jared Simpson (: 1985) is a 39 y.o. female, established patient, seen for positive airway pressure follow-up, she was last seen by me on 2024, previously seen by Dr. Robertson on 2023, prior notes and sleep testing reviewed in detail.  Home sleep test 2023 showed AHI of 17.0/hr with a lowest SpO2 of 69%, duration of SpO2 < 88% 6.7 min.  Weight at time of sleep testing 318 pounds.  BiPAP titration 2023 showed adequate therapy at 18/13 cmH2O.       ASSESSMENT/PLAN:   Diagnosis Orders   1. ANGELES (obstructive sleep apnea)  DME Order for (Specify) as OP    DME Order for (Specify) as OP      2. Hypersomnia, unspecified        3. Chronic nonintractable headache, unspecified headache type        4. Adult BMI 50.0-59.9 kg/sq m (HCC)            AHI = 17.0(2023).  On Auto Bi - Level, Resmed :  Max IPAP 19, Min EPEP 11, PS 6 cmH2O. Set up 2023.     She is not compliant with PAP therapy although when used PAP shows benefit to the patient and remains necessary for control of her sleep apnea. There is continued clinical benefit from the hours of use demonstrated by AHI reduced from 17.0/hr to 2.0/hr.        Follow-up and Dispositions    Return in about 3 months (around 2024) for compliance follow up.         Sleep Apnea -  Sleep apnea treatment is causing negative side effects.  Change in treatment plan is needed.  Change mask size/style for comfort and fit, continue with current pressures  Max IPAP 19, Min EPEP 11, PS 6  cmH2O.    * DME to check mask fit - she feels medium is leaking a lot    *  Supplies - nasal mask and heated tubing - DME HAS BEEN SENDING WRONG TANK - NEEDS RESMED A10 TANK    Orders Placed This Encounter   Procedures    DME Order for (Specify) as OP     Diagnosis: (G47.33) ANGELES (obstructive sleep apnea)  (primary encounter diagnosis)     Replacement Supplies for Positive Airway Pressure Therapy Device:   Duration of need: 99 months.       RESMED AIRCURVE 10 DEVICE     Nasal

## 2024-08-26 ENCOUNTER — OFFICE VISIT (OUTPATIENT)
Age: 39
End: 2024-08-26
Payer: MEDICAID

## 2024-08-26 VITALS
WEIGHT: 293 LBS | HEIGHT: 65 IN | DIASTOLIC BLOOD PRESSURE: 78 MMHG | BODY MASS INDEX: 48.82 KG/M2 | SYSTOLIC BLOOD PRESSURE: 136 MMHG | RESPIRATION RATE: 18 BRPM

## 2024-08-26 DIAGNOSIS — G44.021 INTRACTABLE CHRONIC CLUSTER HEADACHE: ICD-10-CM

## 2024-08-26 DIAGNOSIS — G47.00 INSOMNIA, UNSPECIFIED TYPE: ICD-10-CM

## 2024-08-26 DIAGNOSIS — G43.709 CHRONIC MIGRAINE WITHOUT AURA, NOT INTRACTABLE, WITHOUT STATUS MIGRAINOSUS: Primary | ICD-10-CM

## 2024-08-26 PROCEDURE — 99215 OFFICE O/P EST HI 40 MIN: CPT

## 2024-08-26 RX ORDER — UBROGEPANT 100 MG/1
100 TABLET ORAL AS NEEDED
Qty: 16 TABLET | Refills: 5 | Status: SHIPPED | OUTPATIENT
Start: 2024-08-26

## 2024-08-26 RX ORDER — ATOGEPANT 60 MG/1
60 TABLET ORAL DAILY
Qty: 90 TABLET | Refills: 1 | Status: SHIPPED | OUTPATIENT
Start: 2024-08-26

## 2024-08-26 RX ORDER — HYDROXYZINE HYDROCHLORIDE 10 MG/1
10 TABLET, FILM COATED ORAL 3 TIMES DAILY PRN
COMMUNITY
Start: 2024-08-12

## 2024-08-26 RX ORDER — DULOXETIN HYDROCHLORIDE 20 MG/1
20 CAPSULE, DELAYED RELEASE ORAL DAILY
COMMUNITY
Start: 2024-07-28

## 2024-08-26 ASSESSMENT — PATIENT HEALTH QUESTIONNAIRE - PHQ9
2. FEELING DOWN, DEPRESSED OR HOPELESS: NOT AT ALL
SUM OF ALL RESPONSES TO PHQ QUESTIONS 1-9: 0
SUM OF ALL RESPONSES TO PHQ QUESTIONS 1-9: 0
1. LITTLE INTEREST OR PLEASURE IN DOING THINGS: NOT AT ALL
SUM OF ALL RESPONSES TO PHQ9 QUESTIONS 1 & 2: 0
SUM OF ALL RESPONSES TO PHQ QUESTIONS 1-9: 0
SUM OF ALL RESPONSES TO PHQ QUESTIONS 1-9: 0

## 2024-08-26 ASSESSMENT — ENCOUNTER SYMPTOMS: PHOTOPHOBIA: 1

## 2024-08-26 NOTE — PROGRESS NOTES
Chief Complaint   Patient presents with    Follow-up    Headache      Vitals:    08/26/24 1407   BP: 136/78   Resp: 18

## 2024-08-26 NOTE — PROGRESS NOTES
edema signal at L5-S1.    The conus medullaris terminates at L1-L2. Signal and caliber of the distal  spinal cord are within normal limits.    The paraspinal soft tissues are within normal limits.    T11-T12: Disc bulge. Facet arthropathy. Mild spinal stenosis. Moderate right and  mild left foraminal stenosis.    T12-L1: Facet arthropathy. No stenosis.    L1-L2: Moderate facet arthropathy. No stenosis.    L2-L3: Moderate facet arthropathy. No stenosis.    L3-L4: Marked facet arthropathy. No stenosis.    L4-L5: Disc bulge with superimposed bilateral foraminal disc extrusion. Marked  facet arthropathy. No spinal stenosis. Left subarticular zone narrowing. Mild  bilateral foraminal stenosis.    L5-S1: Disc bulge. Endplate osteophytes. Marked facet arthropathy with  effusions. No spinal stenosis. Right subarticular zone narrowing. Severe  bilateral foraminal stenosis.    Impression  1.  Multilevel degenerative changes and disc disease. No significant lumbar  spinal canal stenosis. L5-S1 severe bilateral and L4-L5 mild bilateral neural  foraminal stenoses.  2.  Incidentally noted and T11-T12 mild spinal canal stenosis, moderate right  foraminal stenosis, and mild left foraminal stenosis.      MRI SHOULDER RIGHT WO CONTRAST 03/29/2022    Narrative  EXAM: MRI SHOULDER RT WO CONT    INDICATION: Dx: Traumatic complete tear of right rotator cuff, initial encounter  [S46.011A (ICD-10-CM)]    COMPARISON: Right shoulder radiographs 3/2/2022    TECHNIQUE: Axial proton density fat-saturated; oblique coronal T1, T2  fat-saturated, and proton density fat-saturated; and oblique sagittal T2  fat-saturated MRI of the right shoulder .    CONTRAST: None.    FINDINGS: A.C. joint: Mild osteoarthritis, with prominent edema signal in the  distal clavicle and a small joint effusion. Anterior acromion process type: Os  acromiale, with cystic change about the pseudoarthrosis    Bone marrow: No acute fracture, dislocation, or marrow replacing  without status migrainosus  -     Atogepant (QULIPTA) 60 MG TABS; Take 60 mg by mouth daily  -     Ubrogepant (UBRELVY) 100 MG TABS; Take 100 mg by mouth as needed (migraine)    Intractable chronic cluster headache  -     Atogepant (QULIPTA) 60 MG TABS; Take 60 mg by mouth daily  -     Ubrogepant (UBRELVY) 100 MG TABS; Take 100 mg by mouth as needed (migraine)    Insomnia, unspecified type      39 year old with chronic migraines that did not respond well to her last medications. She did not like the way the Triptans made her feel with side effects. We talked about lots of options today. She has needle phobia and can not give herself a injection monthly. I am starting her on Qulipta daily for prevention with Ubrelvy as needed. We went over how to take this medication and side effects. Continue to log and use your Bipap nightly. Increase water intake. If Cymbalta is not working we may try Nortriptyline for sleep. See her back in 3 months.       I spent 45 minutes of time today reviewing the medical record and notes, imaging, examining the patient, and face-to-face time, patient/family teaching and medication side effects, and time spent completing documentation.      DUYEN Flores - NP

## 2024-08-27 ENCOUNTER — TELEPHONE (OUTPATIENT)
Age: 39
End: 2024-08-27

## 2024-08-27 NOTE — TELEPHONE ENCOUNTER
RE: Qulipta    Denied due to not trying an injectable first. It states in the record the patient has a needle phobia.  Can we confirm that this is really the case, before I submit a reconsideration.     In reviewing the chart patient was recently prescribed an injectable weight loss drug at an outside facility.  This may interfere with the reconsideration.    Please advise what you would like me to do.    Hii to nurse.

## 2024-08-28 NOTE — TELEPHONE ENCOUNTER
Called patient. Informed her of the denial. She stated she actually never got the wt loss injectable medication as it was denied. Stated she does not want to try an injectable medication due to her phobia. Stated I will send this to the specialist.

## 2024-08-29 ENCOUNTER — TELEPHONE (OUTPATIENT)
Age: 39
End: 2024-08-29

## 2024-08-29 NOTE — TELEPHONE ENCOUNTER
RE: Ubrelvy    Approved:  August 26, 2024 to August 26, 2025    Ok [facility]AC[facility]    Approval letter uploaded to chart    Fyi to nurse.

## 2024-09-11 ENCOUNTER — TELEPHONE (OUTPATIENT)
Age: 39
End: 2024-09-11

## 2024-09-11 NOTE — TELEPHONE ENCOUNTER
[place] Appeals Department is calling to state records are needed for the patient's appeal for QULIPTA.    A consent form is being faxed over to our office currently with additional information.    Representative states that the QULIPTA appeal has also been downgraded to a standard appeal.

## 2024-10-02 NOTE — TELEPHONE ENCOUNTER
Qulipta is denied.  Ubrelvy was approved.    I WILL submit my recon for Qulipta (needle phobia) one more time to make sure that it's a def. denial.    The patient will need to try Ajovy, Emgality or Aimovig first (injectables), these are the preferred medications.    Denial uploaded to chart.    Fyi to nurse.

## 2024-11-19 ENCOUNTER — OFFICE VISIT (OUTPATIENT)
Age: 39
End: 2024-11-19
Payer: MEDICAID

## 2024-11-19 ENCOUNTER — ANCILLARY ORDERS (OUTPATIENT)
Age: 39
End: 2024-11-19

## 2024-11-19 ENCOUNTER — TELEPHONE (OUTPATIENT)
Age: 39
End: 2024-11-19

## 2024-11-19 VITALS
OXYGEN SATURATION: 98 % | HEART RATE: 104 BPM | HEIGHT: 65 IN | SYSTOLIC BLOOD PRESSURE: 138 MMHG | DIASTOLIC BLOOD PRESSURE: 84 MMHG | WEIGHT: 293 LBS | BODY MASS INDEX: 48.82 KG/M2 | RESPIRATION RATE: 17 BRPM

## 2024-11-19 DIAGNOSIS — R51.9 WORSENING HEADACHES: ICD-10-CM

## 2024-11-19 DIAGNOSIS — G44.021 INTRACTABLE CHRONIC CLUSTER HEADACHE: ICD-10-CM

## 2024-11-19 DIAGNOSIS — F41.9 ANXIETY: ICD-10-CM

## 2024-11-19 DIAGNOSIS — G43.709 CHRONIC MIGRAINE WITHOUT AURA, NOT INTRACTABLE, WITHOUT STATUS MIGRAINOSUS: Primary | ICD-10-CM

## 2024-11-19 PROCEDURE — 99215 OFFICE O/P EST HI 40 MIN: CPT

## 2024-11-19 RX ORDER — FREMANEZUMAB-VFRM 225 MG/1.5ML
225 INJECTION SUBCUTANEOUS
Qty: 1.5 ML | Refills: 5 | Status: SHIPPED | OUTPATIENT
Start: 2024-11-19

## 2024-11-19 RX ORDER — UBROGEPANT 100 MG/1
100 TABLET ORAL AS NEEDED
Qty: 16 TABLET | Refills: 5 | Status: SHIPPED | OUTPATIENT
Start: 2024-11-19

## 2024-11-19 RX ORDER — FREMANEZUMAB-VFRM 225 MG/1.5ML
1 INJECTION SUBCUTANEOUS
Qty: 1.68 ML | Refills: 0 | Status: SHIPPED | COMMUNITY
Start: 2024-11-19

## 2024-11-19 ASSESSMENT — PATIENT HEALTH QUESTIONNAIRE - PHQ9
SUM OF ALL RESPONSES TO PHQ QUESTIONS 1-9: 0
SUM OF ALL RESPONSES TO PHQ QUESTIONS 1-9: 0
1. LITTLE INTEREST OR PLEASURE IN DOING THINGS: NOT AT ALL
SUM OF ALL RESPONSES TO PHQ9 QUESTIONS 1 & 2: 0
SUM OF ALL RESPONSES TO PHQ QUESTIONS 1-9: 0
SUM OF ALL RESPONSES TO PHQ QUESTIONS 1-9: 0
2. FEELING DOWN, DEPRESSED OR HOPELESS: NOT AT ALL

## 2024-11-19 ASSESSMENT — ENCOUNTER SYMPTOMS: PHOTOPHOBIA: 0

## 2024-11-19 NOTE — PROGRESS NOTES
Chief Complaint   Patient presents with    Follow-up    Migraine      Vitals:    11/19/24 1342   BP: 138/84   Pulse: (!) 104   Resp: 17   SpO2: 98%      
mm AP with retraction up to the  lateral humeral head level. Associated high grade attritional and undersurface  tear at the superior infraspinatus footprint, with intrasubstance delamination  into the myotendinous junction. Additional moderate grade bursal surface tear at  the mid supraspinatus footprint. Full-thickness tear, partial width with  retraction of the superior subscapularis tendon    Biceps tendon: Intact and located within the bicipital groove.    Muscles: No edema. Disproportionate atrophy and fatty infiltration of the  subscapularis muscle superiorly    Glenoid labrum: Intact.    Glenohumeral joint capsule: within normal limits.    Glenohumeral articular cartilage: Intact. No focal osteochondral lesion.    Soft tissue mass: None.    Impression  1.  Small full-thickness tear at the junctional fibers of supraspinatus and  infraspinatus with retraction up to the mid humeral head level. Associated high  grade superior infraspinatus tendon tear with intrasubstance delamination.  2.  Full-thickness tear of the superior subscapularis tendon with retraction  associated muscle atrophy.  3.  Moderate grade bursal surface tear at the mid supraspinatus footprint.  4.  Possible subtle Hill-Sachs lesion.  5.  Os acromiale with cystic changes about the pseudoarthrosis, correlate for  painful os acromiale.  6.  Distal clavicular prominent edema signal, may be degenerative, but correlate  for distal clavicular osteolysis.            Assessment and Plan   Jared was seen today for follow-up and migraine.    Diagnoses and all orders for this visit:    Chronic migraine without aura, not intractable, without status migrainosus  -     amitriptyline (ELAVIL) 25 MG tablet; Take 1 tablet by mouth nightly  -     Ubrogepant (UBRELVY) 100 MG TABS; Take 100 mg by mouth as needed (migraine)  -     Fremanezumab-vfrm (AJOVY) 225 MG/1.5ML SOAJ; Inject 225 mg into the skin every 30 days    Intractable chronic cluster headache  -

## 2024-11-22 ENCOUNTER — TELEPHONE (OUTPATIENT)
Age: 39
End: 2024-11-22

## 2024-11-22 NOTE — TELEPHONE ENCOUNTER
RE: [place]    APPROVED:  December 17, 2024 to December 17, 2025     Approval letter uploaded to chart    FYI to nurse

## 2024-12-10 ENCOUNTER — TELEPHONE (OUTPATIENT)
Age: 39
End: 2024-12-10

## 2024-12-10 NOTE — TELEPHONE ENCOUNTER
Reviewed compliance report with patient done before office visit on 12/19/24.  Patient is non-compliant and hasn't received a new mask.  She was unaware of going for a Mask Fit at [facility] as ordered by MARI Alexandra NP.  Reviewed compliance of pap therapy and there is a consistent mask leak in the various types of masks.  MYCHART message has been sent concerning how to fit the mask, Resmed mask marcello that can scan face and give proper size of mask.  Patient will contact [facility] and an email will be sent to [facility] from [facility] requesting the mask fit.    Follow up with download in 30 days to assess compliance and mask leaks.

## 2025-01-08 ENCOUNTER — TELEPHONE (OUTPATIENT)
Age: 40
End: 2025-01-08

## 2025-01-08 NOTE — TELEPHONE ENCOUNTER
30 day follow up compliance report ran.  Spoke with patient concerning the non-compliance and mask leaks.  Patient stated she went to [facility] for the mask fit but has still not received her supplies.  Email set to [employer] rep inquiring about the status of the supplies.    Will follow up in 30 days.    Emiliana Bell,RRT,RPSGT, CSE

## 2025-03-25 ENCOUNTER — TELEPHONE (OUTPATIENT)
Age: 40
End: 2025-03-25

## 2025-03-25 ENCOUNTER — OFFICE VISIT (OUTPATIENT)
Age: 40
End: 2025-03-25
Payer: MEDICAID

## 2025-03-25 VITALS
HEART RATE: 88 BPM | BODY MASS INDEX: 48.82 KG/M2 | WEIGHT: 293 LBS | OXYGEN SATURATION: 98 % | SYSTOLIC BLOOD PRESSURE: 124 MMHG | DIASTOLIC BLOOD PRESSURE: 78 MMHG | HEIGHT: 65 IN | RESPIRATION RATE: 17 BRPM

## 2025-03-25 DIAGNOSIS — G43.709 CHRONIC MIGRAINE WITHOUT AURA, NOT INTRACTABLE, WITHOUT STATUS MIGRAINOSUS: Primary | ICD-10-CM

## 2025-03-25 DIAGNOSIS — F41.9 ANXIETY: ICD-10-CM

## 2025-03-25 PROCEDURE — 99214 OFFICE O/P EST MOD 30 MIN: CPT | Performed by: PSYCHIATRY & NEUROLOGY

## 2025-03-25 RX ORDER — TOPIRAMATE 25 MG/1
25 TABLET, FILM COATED ORAL 2 TIMES DAILY
Qty: 180 TABLET | Refills: 1 | Status: SHIPPED | OUTPATIENT
Start: 2025-03-25

## 2025-03-25 RX ORDER — FLUTICASONE PROPIONATE 50 MCG
1 SPRAY, SUSPENSION (ML) NASAL 2 TIMES DAILY
COMMUNITY
Start: 2025-02-06

## 2025-03-25 RX ORDER — FREMANEZUMAB-VFRM 225 MG/1.5ML
1 INJECTION SUBCUTANEOUS
Qty: 1.68 ML | Refills: 5 | Status: ACTIVE | OUTPATIENT
Start: 2025-03-25

## 2025-03-25 ASSESSMENT — PATIENT HEALTH QUESTIONNAIRE - PHQ9
SUM OF ALL RESPONSES TO PHQ QUESTIONS 1-9: 0
1. LITTLE INTEREST OR PLEASURE IN DOING THINGS: NOT AT ALL
SUM OF ALL RESPONSES TO PHQ QUESTIONS 1-9: 0
SUM OF ALL RESPONSES TO PHQ QUESTIONS 1-9: 0
2. FEELING DOWN, DEPRESSED OR HOPELESS: NOT AT ALL
SUM OF ALL RESPONSES TO PHQ QUESTIONS 1-9: 0

## 2025-03-25 NOTE — PROGRESS NOTES
Chief Complaint   Patient presents with    Follow-up     Migraine     HISTORY OF PRESENT ILLNESS  Jared Simpson came back for follow-up.  She was last seen by me in February 2024 and then had a follow-up with Whit Obrien NP 6 months ago    She has chronic migraine headaches.  At the last visit she was started on amitriptyline in place of duloxetine to help with headaches and she was also put on Ajovy monthly injections.  Overall, this has made a difference but she still gets about 1 migraine per week.  It is not as bad generally but there are at least 1 or 2 episodes where Ubrelvy does not seem to work very well    She describes her headaches as mainly in the right periorbital area but it changes location.  It is associated with watering from her eye and feels quite disabling.    Cannot think of any triggers.  She does have obstructive sleep apnea and uses CPAP.    No other focal motor or sensory symptoms.  She does not have a PCP.  Her eye exam was unremarkable recently except that it showed a larger size of optic disks    Previously tried migraine prophylaxis: Cymbalta, topiramate, verapamil, nortriptyline  Rescue medications: Rizatriptan, Ubrelvy      Current Outpatient Medications   Medication Sig    fluticasone (FLONASE) 50 MCG/ACT nasal spray 1 spray by Nasal route 2 times daily    topiramate (TOPAMAX) 25 MG tablet Take 1 tablet by mouth 2 times daily    Fremanezumab-vfrm (AJOVY) 225 MG/1.5ML SOSY Inject 1 Adjustable Dose Pre-filled Pen Syringe into the skin every 30 days    SAXENDA 18 MG/3ML SOPN Inject 3 mg into the skin daily    diclofenac (VOLTAREN) 75 MG EC tablet Take 1 tablet by mouth 2 times daily    amitriptyline (ELAVIL) 25 MG tablet Take 1 tablet by mouth nightly    Ubrogepant (UBRELVY) 100 MG TABS Take 100 mg by mouth as needed (migraine)    diclofenac sodium (VOLTAREN) 1 % GEL ceived the following from [facility]: Outside name: diclofenac (VOLTAREN) 1 % gel     No current

## (undated) DEVICE — PACK PROCEDURE SURG C SECT KT SMH

## (undated) DEVICE — STERILE POLYISOPRENE POWDER-FREE SURGICAL GLOVES: Brand: PROTEXIS

## (undated) DEVICE — SOLUTION IV 1000ML 0.9% SOD CHL

## (undated) DEVICE — REM POLYHESIVE ADULT PATIENT RETURN ELECTRODE: Brand: VALLEYLAB

## (undated) DEVICE — DEVON™ KNEE AND BODY STRAP 60" X 3" (1.5 M X 7.6 CM): Brand: DEVON

## (undated) DEVICE — 3000CC GUARDIAN II: Brand: GUARDIAN

## (undated) DEVICE — Device: Brand: PORTEX

## (undated) DEVICE — SUTURE MCRYL SZ 0 L36IN ABSRB VLT L48MM CTX 1/2 CIR Y398H

## (undated) DEVICE — STERILE POLYISOPRENE POWDER-FREE SURGICAL GLOVES WITH EMOLLIENT COATING: Brand: PROTEXIS

## (undated) DEVICE — POOLE SUCTION INSTRUMENT WITH REMOVABLE SHEATH: Brand: POOLE

## (undated) DEVICE — SUTURE VCRL SZ 2-0 L36IN ABSRB VLT L36MM CT-1 1/2 CIR J345H

## (undated) DEVICE — ROYALSILK SURGICAL GOWN, L: Brand: CONVERTORS

## (undated) DEVICE — LIGHT HANDLE: Brand: DEVON

## (undated) DEVICE — ROYAL SILK SURGICAL GOWN, XXL: Brand: CONVERTORS

## (undated) DEVICE — SUTURE PLN GUT SZ 2-0 L27IN ABSRB YELLOWISH TAN L70MM XLH 53T

## (undated) DEVICE — SUTURE MCRYL SZ 3-0 L27IN ABSRB UD L60MM KS STR REV CUT Y523H

## (undated) DEVICE — SOLUTION IRRIG 1000ML H2O STRL BLT

## (undated) DEVICE — (D)PREP SKN CHLRAPRP APPL 26ML -- CONVERT TO ITEM 371833

## (undated) DEVICE — DRSG AQUACEL SURG 3.5 X 10IN -- CONVERT TO ITEM 370183

## (undated) DEVICE — SPONGE: LAP 18X18 W  200/CS: Brand: MEDICAL ACTION INDUSTRIES

## (undated) DEVICE — CATH FOLEY 16F LUBRI-SIL IC --

## (undated) DEVICE — SUTURE MCRYL SZ 4-0 L27IN ABSRB UD L24MM PS-1 3/8 CIR PRIM Y935H

## (undated) DEVICE — COVERALL PREM SMS 2XL KNIT --

## (undated) DEVICE — DRAPE FLD WRM W44XL66IN C6L FOR INTRATEMP SYS THERMABASIN

## (undated) DEVICE — SUTURE VCRL SZ 0 L36IN ABSRB VLT L40MM CT 1/2 CIR J358H

## (undated) DEVICE — KENDALL SCD EXPRESS SLEEVES, KNEE LENGTH, MEDIUM: Brand: KENDALL SCD

## (undated) DEVICE — SOLIDIFIER MEDC 1200ML -- CONVERT TO 356117